# Patient Record
Sex: MALE | Race: WHITE | NOT HISPANIC OR LATINO | Employment: OTHER | URBAN - NONMETROPOLITAN AREA
[De-identification: names, ages, dates, MRNs, and addresses within clinical notes are randomized per-mention and may not be internally consistent; named-entity substitution may affect disease eponyms.]

---

## 2020-10-15 ENCOUNTER — OFFICE VISIT (OUTPATIENT)
Dept: CARDIOLOGY | Facility: CLINIC | Age: 75
End: 2020-10-15

## 2020-10-15 VITALS
WEIGHT: 205 LBS | SYSTOLIC BLOOD PRESSURE: 132 MMHG | HEIGHT: 66 IN | DIASTOLIC BLOOD PRESSURE: 60 MMHG | OXYGEN SATURATION: 99 % | HEART RATE: 73 BPM | BODY MASS INDEX: 32.95 KG/M2

## 2020-10-15 DIAGNOSIS — I25.10 CORONARY ARTERY DISEASE INVOLVING NATIVE CORONARY ARTERY OF NATIVE HEART WITHOUT ANGINA PECTORIS: Primary | ICD-10-CM

## 2020-10-15 DIAGNOSIS — R06.09 DOE (DYSPNEA ON EXERTION): ICD-10-CM

## 2020-10-15 DIAGNOSIS — I10 BENIGN ESSENTIAL HTN: ICD-10-CM

## 2020-10-15 DIAGNOSIS — Z87.891 EX-SMOKER FOR MORE THAN 1 YEAR: ICD-10-CM

## 2020-10-15 DIAGNOSIS — E78.2 MIXED HYPERLIPIDEMIA: ICD-10-CM

## 2020-10-15 DIAGNOSIS — Z95.1 S/P CABG (CORONARY ARTERY BYPASS GRAFT): ICD-10-CM

## 2020-10-15 DIAGNOSIS — I35.1 NONRHEUMATIC AORTIC VALVE INSUFFICIENCY: ICD-10-CM

## 2020-10-15 DIAGNOSIS — Z82.49 FAMILY HISTORY OF EARLY CAD: ICD-10-CM

## 2020-10-15 PROCEDURE — 93000 ELECTROCARDIOGRAM COMPLETE: CPT | Performed by: INTERNAL MEDICINE

## 2020-10-15 PROCEDURE — 99204 OFFICE O/P NEW MOD 45 MIN: CPT | Performed by: INTERNAL MEDICINE

## 2020-10-15 RX ORDER — PREDNISONE 1 MG/1
5 TABLET ORAL TAKE AS DIRECTED
COMMUNITY

## 2020-10-15 RX ORDER — NITROGLYCERIN 0.4 MG/1
0.4 TABLET SUBLINGUAL
COMMUNITY

## 2020-10-15 RX ORDER — ATORVASTATIN CALCIUM 10 MG/1
10 TABLET, FILM COATED ORAL DAILY
COMMUNITY
End: 2021-12-09 | Stop reason: SDUPTHER

## 2020-10-15 RX ORDER — TAMSULOSIN HYDROCHLORIDE 0.4 MG/1
1 CAPSULE ORAL DAILY
COMMUNITY
End: 2022-01-25 | Stop reason: SDUPTHER

## 2020-10-15 RX ORDER — TRIAMCINOLONE ACETONIDE 1 MG/G
CREAM TOPICAL DAILY PRN
COMMUNITY

## 2020-10-15 RX ORDER — CITALOPRAM 20 MG/1
20 TABLET ORAL DAILY
COMMUNITY

## 2020-10-15 RX ORDER — HYDRALAZINE HYDROCHLORIDE 50 MG/1
TABLET, FILM COATED ORAL 2 TIMES DAILY
COMMUNITY
Start: 2020-08-26 | End: 2021-04-20 | Stop reason: SDUPTHER

## 2020-10-15 RX ORDER — CLOPIDOGREL BISULFATE 75 MG/1
TABLET ORAL
COMMUNITY
End: 2021-04-20 | Stop reason: SDUPTHER

## 2020-10-15 RX ORDER — PANTOPRAZOLE SODIUM 40 MG/1
40 TABLET, DELAYED RELEASE ORAL DAILY
COMMUNITY

## 2020-10-15 RX ORDER — AMLODIPINE BESYLATE 10 MG/1
10 TABLET ORAL DAILY
COMMUNITY
Start: 2020-08-26 | End: 2022-07-12 | Stop reason: SDUPTHER

## 2020-10-15 RX ORDER — DIAZEPAM 5 MG/1
5 TABLET ORAL NIGHTLY
COMMUNITY

## 2020-10-15 RX ORDER — BUPROPION HYDROCHLORIDE 150 MG/1
150 TABLET, EXTENDED RELEASE ORAL DAILY
Status: ON HOLD | COMMUNITY
End: 2021-08-18

## 2020-10-15 RX ORDER — ISOSORBIDE MONONITRATE 60 MG/1
TABLET, EXTENDED RELEASE ORAL DAILY
COMMUNITY
Start: 2020-07-18 | End: 2021-04-20 | Stop reason: SDUPTHER

## 2020-10-15 RX ORDER — ACETIC ACID 20.65 MG/ML
SOLUTION AURICULAR (OTIC)
COMMUNITY
End: 2021-07-25

## 2020-10-15 NOTE — PROGRESS NOTES
Marika Gutierrez  [id number]  1945  74 y.o.  male    Referring Provider: Frank Martinez MD    Reason for  Visit:  Initial visit to establish care with myself for ongoing longitudinal care related to cardiovascular issues   coronary artery disease   Coronary artery bypass grafting 1996  Prior history of aortic insufficiency     Subjective    Mild chronic exertional shortness of breath on exertion relieved with rest  No significant cough or wheezing    No palpitations  No associated chest pain  No significant pedal edema    No fever or chills  No significant expectoration    No hemoptysis  No presyncope or syncope    Tolerating current medications well with no untoward side effects   Compliant with prescribed medication regimen. Tries to adhere to cardiac diet.     Joint pain in small, medium and large joints   Chronic low back pain    Easy fatiguability     History of present illness:  Marika Gutierrez is a 74 y.o. yo male with history of coronary artery disease coronary artery bypass grafting  who presents today for   Chief Complaint   Patient presents with   • Coronary Artery Disease     new pt - HX OF CABG (1992)   .    History  Past Medical History:   Diagnosis Date   • Aortic valve regurgitation    • Cancer (CMS/HCC)    • Carotid stenosis    • COPD (chronic obstructive pulmonary disease) (CMS/HCC)    • Coronary artery disease    • GERD (gastroesophageal reflux disease)    • Hyperlipidemia    • Hypertension    • Renal cell carcinoma (CMS/HCC)    ,   Past Surgical History:   Procedure Laterality Date   • BACK SURGERY     • CARDIAC CATHETERIZATION      X 4 - 2010 - 2005 - 2003 - 1996   • CORONARY ARTERY BYPASS GRAFT      X 4   • EXTERNAL EAR SURGERY     • NEPHRECTOMY      LEFT - 2017   ,   History reviewed. No pertinent family history.,   Social History     Tobacco Use   • Smoking status: Former Smoker   • Smokeless tobacco: Former User   Substance Use Topics   • Alcohol use: Not Currently   • Drug use: Not  Currently   ,     Medications  Current Outpatient Medications   Medication Sig Dispense Refill   • acetic acid (VOSOL) 2 % otic solution acetic acid 2 % ear solution     • amLODIPine (NORVASC) 10 MG tablet      • Aspirin Buf,CaCarb-MgCarb-MgO, 81 MG tablet Take 81 mg by mouth 3 (Three) Times a Week.     • atorvastatin (LIPITOR) 10 MG tablet atorvastatin 10 mg tablet     • buPROPion SR (WELLBUTRIN SR) 150 MG 12 hr tablet bupropion HCl  mg tablet,12 hr sustained-release     • citalopram (CeleXA) 20 MG tablet citalopram 20 mg tablet     • clopidogrel (PLAVIX) 75 MG tablet clopidogrel 75 mg tablet     • diazePAM (VALIUM) 5 MG tablet diazepam 5 mg tablet     • DULOXETINE HCL PO 30 mg Daily.     • hydrALAZINE (APRESOLINE) 50 MG tablet 2 (two) times a day.     • isosorbide mononitrate (IMDUR) 60 MG 24 hr tablet Daily.     • nitroglycerin (NITROSTAT) 0.4 MG SL tablet nitroglycerin 0.4 mg sublingual tablet     • pantoprazole (PROTONIX) 40 MG EC tablet Daily.     • predniSONE (DELTASONE) 5 MG tablet prednisone 5 mg tablet     • tamsulosin (FLOMAX) 0.4 MG capsule 24 hr capsule tamsulosin 0.4 mg capsule     • triamcinolone (KENALOG) 0.1 % cream triamcinolone acetonide 0.1 % topical cream       No current facility-administered medications for this visit.        Allergies:  Cyclobenzaprine, Hydrocodone-acetaminophen, and Pentazocine    Review of Systems  Review of Systems   Constitution: Negative.   HENT: Negative.    Eyes: Negative.    Cardiovascular: Positive for dyspnea on exertion and leg swelling. Negative for chest pain, claudication, cyanosis, irregular heartbeat, near-syncope, orthopnea, palpitations, paroxysmal nocturnal dyspnea and syncope.   Respiratory: Positive for shortness of breath.    Endocrine: Negative.    Hematologic/Lymphatic: Negative.    Skin: Negative.    Musculoskeletal: Positive for arthritis, back pain and joint pain.   Gastrointestinal: Negative for anorexia.   Genitourinary: Negative.   "  Neurological: Negative.    Psychiatric/Behavioral: Negative.        Objective     Physical Exam:  /60   Pulse 73   Ht 167.6 cm (66\")   Wt 93 kg (205 lb)   SpO2 99%   BMI 33.09 kg/m²     Physical Exam  Constitutional:       Appearance: He is well-developed.   HENT:      Head: Normocephalic.   Neck:      Musculoskeletal: No edema.      Vascular: Normal carotid pulses. No carotid bruit or JVD.      Trachea: No tracheal tenderness or tracheal deviation.   Cardiovascular:      Rate and Rhythm: Regular rhythm.      Pulses: Normal pulses.      Heart sounds: Murmur present. Systolic murmur present with a grade of 2/6.   Pulmonary:      Effort: Pulmonary effort is normal.      Breath sounds: No stridor.   Abdominal:      General: There is no distension.      Palpations: Abdomen is soft.      Tenderness: There is no abdominal tenderness.   Skin:     General: Skin is warm.      Findings: Bruising and ecchymosis present.   Neurological:      Mental Status: He is alert.      Cranial Nerves: No cranial nerve deficit.      Sensory: No sensory deficit.   Psychiatric:         Speech: Speech normal.         Behavior: Behavior normal.         Results Review:      ____________________________________________________________________________________________________________________________________________  Health maintenance and recommendations    Low salt/ HTN/ Heart healthy carbohydrate restricted cardiac diet   The patient is advised to reduce or avoid caffeine or other cardiac stimulants.   Minimize or avoid  NSAID-type medications      Monitor for any signs of bleeding including red or dark stools. Fall precautions.   Advised staying uptodate with immunizations per established standard guidelines.    Offered to give patient  a copy of my notes     Questions were encouraged, asked and answered to the patient's  understanding and satisfaction. Questions if any regarding current medications and side effects, need for " refills and importance of compliance to medications stressed.    Reviewed available prior notes, consults, prior visits, laboratory findings, radiology and cardiology relevant reports. Updated chart as applicable. I have reviewed the patient's medical history in detail and updated the computerized patient record as relevant.      Updated patient regarding any new or relevant abnormalities on review of records or any new findings on physical exam. Mentioned to patient about purpose of visit and desirable health short and long term goals and objectives.    Primary to monitor CBC CMP Lipid panel and TSH as applicable    ___________________________________________________________________________________________________________________________________________         ECG 12 Lead    Date/Time: 10/15/2020 11:32 AM  Performed by: Pradip Hoskins MD  Authorized by: Pradip Hoskins MD   Comparison: not compared with previous ECG   Rhythm: sinus rhythm  Rate: normal  Conduction: conduction normal  ST Segments: ST segments normal  T Waves: T waves normal  QRS axis: normal  Other: no other findings    Clinical impression: normal ECG            Assessment/Plan   Diagnoses and all orders for this visit:    1. Coronary artery disease involving native coronary artery of native heart without angina pectoris (Primary)    2. S/P CABG (coronary artery bypass graft) 1996 [place]     3. Mixed hyperlipidemia    4. Benign essential HTN    5. Family history of early CAD    6. Ex-smoker for more than 1 year 1PPD x 40 years quit 16 years ago    7. BLUE (dyspnea on exertion)  -     Adult Transthoracic Echo Complete W/ Cont if Necessary Per Protocol; Future    8. Nonrheumatic aortic valve insufficiency  -     Adult Transthoracic Echo Complete W/ Cont if Necessary Per Protocol; Future    Other orders  -     ECG 12 Lead          Plan    Orders Placed This Encounter   Procedures   • ECG 12 Lead     This order was created via procedure documentation    • Adult Transthoracic Echo Complete W/ Cont if Necessary Per Protocol     Myocardial strain to be performed during echocardiogram as long as technically feasible     Standing Status:   Future     Standing Expiration Date:   10/15/2021     Order Specific Question:   Reason for exam?     Answer:   Dyspnea      Keep LDL below 70 mg/dl. Monitor liver and renal functions.   Monitor CBC, CMP, TSH (as indicated) and Lipid Panel by primary     S/L NTG PRN for chest pain, call me or go to ER if has to use S/L nitroglycerin   OK to stop Aspirin due to severe bruising and stay on Plavix         Return in about 6 months (around 4/15/2021).

## 2020-11-16 ENCOUNTER — APPOINTMENT (OUTPATIENT)
Dept: CARDIOLOGY | Facility: HOSPITAL | Age: 75
End: 2020-11-16

## 2020-11-27 ENCOUNTER — HOSPITAL ENCOUNTER (OUTPATIENT)
Dept: CARDIOLOGY | Facility: HOSPITAL | Age: 75
Discharge: HOME | End: 2020-11-27
Admitting: INTERNAL MEDICINE

## 2020-11-27 VITALS
SYSTOLIC BLOOD PRESSURE: 160 MMHG | DIASTOLIC BLOOD PRESSURE: 71 MMHG | BODY MASS INDEX: 32.95 KG/M2 | WEIGHT: 205 LBS | HEIGHT: 66 IN

## 2020-11-27 DIAGNOSIS — R06.09 DOE (DYSPNEA ON EXERTION): ICD-10-CM

## 2020-11-27 DIAGNOSIS — I35.1 NONRHEUMATIC AORTIC VALVE INSUFFICIENCY: ICD-10-CM

## 2020-11-27 PROCEDURE — 93306 TTE W/DOPPLER COMPLETE: CPT

## 2020-11-27 PROCEDURE — 93306 TTE W/DOPPLER COMPLETE: CPT | Performed by: INTERNAL MEDICINE

## 2020-11-27 PROCEDURE — 93356 MYOCRD STRAIN IMG SPCKL TRCK: CPT

## 2020-11-27 PROCEDURE — 93356 MYOCRD STRAIN IMG SPCKL TRCK: CPT | Performed by: INTERNAL MEDICINE

## 2020-11-28 LAB
BH CV ECHO MEAS - AO MAX PG (FULL): 5.2 MMHG
BH CV ECHO MEAS - AO MAX PG: 9.4 MMHG
BH CV ECHO MEAS - AO MEAN PG (FULL): 3 MMHG
BH CV ECHO MEAS - AO MEAN PG: 5 MMHG
BH CV ECHO MEAS - AO ROOT AREA (BSA CORRECTED): 1.5
BH CV ECHO MEAS - AO ROOT AREA: 7.5 CM^2
BH CV ECHO MEAS - AO ROOT DIAM: 3.1 CM
BH CV ECHO MEAS - AO V2 MAX: 153 CM/SEC
BH CV ECHO MEAS - AO V2 MEAN: 105 CM/SEC
BH CV ECHO MEAS - AO V2 VTI: 33.5 CM
BH CV ECHO MEAS - AVA(I,A): 3 CM^2
BH CV ECHO MEAS - AVA(I,D): 3 CM^2
BH CV ECHO MEAS - AVA(V,A): 3 CM^2
BH CV ECHO MEAS - AVA(V,D): 3 CM^2
BH CV ECHO MEAS - BSA(HAYCOCK): 2.1 M^2
BH CV ECHO MEAS - BSA: 2 M^2
BH CV ECHO MEAS - BZI_BMI: 33.1 KILOGRAMS/M^2
BH CV ECHO MEAS - BZI_METRIC_HEIGHT: 167.6 CM
BH CV ECHO MEAS - BZI_METRIC_WEIGHT: 93 KG
BH CV ECHO MEAS - EDV(CUBED): 159.2 ML
BH CV ECHO MEAS - EDV(MOD-SP4): 112 ML
BH CV ECHO MEAS - EDV(TEICH): 142.5 ML
BH CV ECHO MEAS - EF(CUBED): 72.8 %
BH CV ECHO MEAS - EF(MOD-SP4): 65.3 %
BH CV ECHO MEAS - EF(TEICH): 64.1 %
BH CV ECHO MEAS - ESV(CUBED): 43.2 ML
BH CV ECHO MEAS - ESV(MOD-SP4): 38.9 ML
BH CV ECHO MEAS - ESV(TEICH): 51.2 ML
BH CV ECHO MEAS - FS: 35.2 %
BH CV ECHO MEAS - IVS/LVPW: 1.2
BH CV ECHO MEAS - IVSD: 1.1 CM
BH CV ECHO MEAS - LA DIMENSION: 4.1 CM
BH CV ECHO MEAS - LA/AO: 1.3
BH CV ECHO MEAS - LAT PEAK E' VEL: 9.6 CM/SEC
BH CV ECHO MEAS - LV DIASTOLIC VOL/BSA (35-75): 55.4 ML/M^2
BH CV ECHO MEAS - LV MASS(C)D: 206 GRAMS
BH CV ECHO MEAS - LV MASS(C)DI: 101.9 GRAMS/M^2
BH CV ECHO MEAS - LV MAX PG: 4.2 MMHG
BH CV ECHO MEAS - LV MEAN PG: 2 MMHG
BH CV ECHO MEAS - LV SYSTOLIC VOL/BSA (12-30): 19.2 ML/M^2
BH CV ECHO MEAS - LV V1 MAX: 102 CM/SEC
BH CV ECHO MEAS - LV V1 MEAN: 71.2 CM/SEC
BH CV ECHO MEAS - LV V1 VTI: 22.5 CM
BH CV ECHO MEAS - LVIDD: 5.4 CM
BH CV ECHO MEAS - LVIDS: 3.5 CM
BH CV ECHO MEAS - LVLD AP4: 8.7 CM
BH CV ECHO MEAS - LVLS AP4: 7.8 CM
BH CV ECHO MEAS - LVOT AREA (M): 4.5 CM^2
BH CV ECHO MEAS - LVOT AREA: 4.5 CM^2
BH CV ECHO MEAS - LVOT DIAM: 2.4 CM
BH CV ECHO MEAS - LVPWD: 0.92 CM
BH CV ECHO MEAS - MED PEAK E' VEL: 8.7 CM/SEC
BH CV ECHO MEAS - MV A MAX VEL: 85.9 CM/SEC
BH CV ECHO MEAS - MV DEC SLOPE: 527 CM/SEC^2
BH CV ECHO MEAS - MV DEC TIME: 0.26 SEC
BH CV ECHO MEAS - MV E MAX VEL: 78.1 CM/SEC
BH CV ECHO MEAS - MV E/A: 0.91
BH CV ECHO MEAS - MV MAX PG: 8.4 MMHG
BH CV ECHO MEAS - MV MEAN PG: 3 MMHG
BH CV ECHO MEAS - MV P1/2T MAX VEL: 142 CM/SEC
BH CV ECHO MEAS - MV P1/2T: 78.9 MSEC
BH CV ECHO MEAS - MV V2 MAX: 145 CM/SEC
BH CV ECHO MEAS - MV V2 MEAN: 78.8 CM/SEC
BH CV ECHO MEAS - MV V2 VTI: 41 CM
BH CV ECHO MEAS - MVA P1/2T LCG: 1.5 CM^2
BH CV ECHO MEAS - MVA(P1/2T): 2.8 CM^2
BH CV ECHO MEAS - MVA(VTI): 2.5 CM^2
BH CV ECHO MEAS - PA MAX PG: 3 MMHG
BH CV ECHO MEAS - PA V2 MAX: 85.9 CM/SEC
BH CV ECHO MEAS - PI END-D VEL: 97.7 CM/SEC
BH CV ECHO MEAS - RAP SYSTOLE: 5 MMHG
BH CV ECHO MEAS - RVDD: 3.5 CM
BH CV ECHO MEAS - RVSP: 33.3 MMHG
BH CV ECHO MEAS - SI(AO): 125.1 ML/M^2
BH CV ECHO MEAS - SI(CUBED): 57.4 ML/M^2
BH CV ECHO MEAS - SI(LVOT): 50.4 ML/M^2
BH CV ECHO MEAS - SI(MOD-SP4): 36.2 ML/M^2
BH CV ECHO MEAS - SI(TEICH): 45.2 ML/M^2
BH CV ECHO MEAS - SV(AO): 252.8 ML
BH CV ECHO MEAS - SV(CUBED): 116 ML
BH CV ECHO MEAS - SV(LVOT): 101.8 ML
BH CV ECHO MEAS - SV(MOD-SP4): 73.1 ML
BH CV ECHO MEAS - SV(TEICH): 91.3 ML
BH CV ECHO MEAS - TR MAX VEL: 266 CM/SEC
BH CV ECHO MEASUREMENTS AVERAGE E/E' RATIO: 8.54
LEFT ATRIUM VOLUME INDEX: 33.3 ML/M2
LEFT ATRIUM VOLUME: 67.3 CM3
MAXIMAL PREDICTED HEART RATE: 146 BPM
STRESS TARGET HR: 124 BPM

## 2021-03-15 ENCOUNTER — BULK ORDERING (OUTPATIENT)
Dept: CASE MANAGEMENT | Facility: OTHER | Age: 76
End: 2021-03-15

## 2021-03-15 DIAGNOSIS — Z23 IMMUNIZATION DUE: ICD-10-CM

## 2021-04-20 ENCOUNTER — OFFICE VISIT (OUTPATIENT)
Dept: CARDIOLOGY | Facility: CLINIC | Age: 76
End: 2021-04-20

## 2021-04-20 VITALS
HEART RATE: 63 BPM | WEIGHT: 206 LBS | HEIGHT: 64 IN | SYSTOLIC BLOOD PRESSURE: 122 MMHG | DIASTOLIC BLOOD PRESSURE: 60 MMHG | BODY MASS INDEX: 35.17 KG/M2

## 2021-04-20 DIAGNOSIS — R06.09 DOE (DYSPNEA ON EXERTION): ICD-10-CM

## 2021-04-20 DIAGNOSIS — Z87.891 EX-SMOKER FOR MORE THAN 1 YEAR: ICD-10-CM

## 2021-04-20 DIAGNOSIS — I10 BENIGN ESSENTIAL HTN: Primary | ICD-10-CM

## 2021-04-20 DIAGNOSIS — I65.22 CAROTID STENOSIS, ASYMPTOMATIC, LEFT: ICD-10-CM

## 2021-04-20 DIAGNOSIS — Z95.1 S/P CABG (CORONARY ARTERY BYPASS GRAFT): ICD-10-CM

## 2021-04-20 DIAGNOSIS — E78.2 MIXED HYPERLIPIDEMIA: ICD-10-CM

## 2021-04-20 DIAGNOSIS — I35.1 NONRHEUMATIC AORTIC VALVE INSUFFICIENCY: ICD-10-CM

## 2021-04-20 DIAGNOSIS — I25.10 CORONARY ARTERY DISEASE INVOLVING NATIVE CORONARY ARTERY OF NATIVE HEART WITHOUT ANGINA PECTORIS: ICD-10-CM

## 2021-04-20 DIAGNOSIS — Z82.49 FAMILY HISTORY OF EARLY CAD: ICD-10-CM

## 2021-04-20 PROCEDURE — 99214 OFFICE O/P EST MOD 30 MIN: CPT | Performed by: INTERNAL MEDICINE

## 2021-04-20 PROCEDURE — 93000 ELECTROCARDIOGRAM COMPLETE: CPT | Performed by: INTERNAL MEDICINE

## 2021-04-20 RX ORDER — ISOSORBIDE MONONITRATE 60 MG/1
60 TABLET, EXTENDED RELEASE ORAL DAILY
Qty: 90 TABLET | Refills: 3 | Status: SHIPPED | OUTPATIENT
Start: 2021-04-20 | End: 2021-08-25 | Stop reason: SDUPTHER

## 2021-04-20 RX ORDER — HYDRALAZINE HYDROCHLORIDE 50 MG/1
50 TABLET, FILM COATED ORAL 2 TIMES DAILY
Qty: 180 TABLET | Refills: 3 | Status: SHIPPED | OUTPATIENT
Start: 2021-04-20 | End: 2022-06-29 | Stop reason: SDUPTHER

## 2021-04-20 RX ORDER — CLOPIDOGREL BISULFATE 75 MG/1
75 TABLET ORAL EVERY OTHER DAY
Qty: 90 TABLET | Refills: 1 | Status: SHIPPED | OUTPATIENT
Start: 2021-04-20 | End: 2022-04-06

## 2021-04-20 NOTE — PROGRESS NOTES
Marika Gutierrez  [id number]  1945  75 y.o.  male    Referring Provider: Frank Martinez MD    Reason for  Visit:      Here for routine follow up   Initial visit to establish care with myself for ongoing longitudinal care related to cardiovascular issues   coronary artery disease   Coronary artery bypass grafting 1996  Prior history of aortic insufficiency   Cardiac workup test results as below: echo   Prior carotid artery stenosis per patient with no recent testing       Subjective    Overall feels the same   No new events or complaints since last visit   Overall the patient feels no major change from baseline symptoms   Similar symptoms as during last visit     Tolerating current medications well with no untoward side effects   Compliant with prescribed medication regimen. Tries to adhere to cardiac diet.      Mild chronic exertional shortness of breath on exertion relieved with rest  No significant cough or wheezing    No palpitations  No associated chest pain  No significant pedal edema    No fever or chills  No significant expectoration    No hemoptysis  No presyncope or syncope  Joint pain in small, medium and large joints   Chronic low back pain    Easy fatiguability     History of present illness:  Marika Gutierrez is a 75 y.o. yo male with history of coronary artery disease coronary artery bypass grafting  who presents today for   Chief Complaint   Patient presents with   • Coronary Artery Disease     6 month follow up    .    History  Past Medical History:   Diagnosis Date   • Aortic valve regurgitation    • Cancer (CMS/HCC)    • Carotid stenosis    • COPD (chronic obstructive pulmonary disease) (CMS/HCC)    • Coronary artery disease    • GERD (gastroesophageal reflux disease)    • Hyperlipidemia    • Hypertension    • Renal cell carcinoma (CMS/HCC)    ,   Past Surgical History:   Procedure Laterality Date   • BACK SURGERY     • CARDIAC CATHETERIZATION      X 4 - 2010 - 2005 - 2003 - 1996   •  CORONARY ARTERY BYPASS GRAFT      X 4   • EXTERNAL EAR SURGERY     • NEPHRECTOMY      LEFT - 2017   ,   History reviewed. No pertinent family history.,   Social History     Tobacco Use   • Smoking status: Former Smoker   • Smokeless tobacco: Former User   Substance Use Topics   • Alcohol use: Not Currently   • Drug use: Not Currently   ,     Medications  Current Outpatient Medications   Medication Sig Dispense Refill   • acetic acid (VOSOL) 2 % otic solution acetic acid 2 % ear solution     • amLODIPine (NORVASC) 10 MG tablet      • Aspirin Buf,CaCarb-MgCarb-MgO, 81 MG tablet Take 81 mg by mouth 3 (Three) Times a Week.     • atorvastatin (LIPITOR) 10 MG tablet atorvastatin 10 mg tablet     • buPROPion SR (WELLBUTRIN SR) 150 MG 12 hr tablet bupropion HCl  mg tablet,12 hr sustained-release     • citalopram (CeleXA) 20 MG tablet citalopram 20 mg tablet     • clopidogrel (PLAVIX) 75 MG tablet Take 1 tablet by mouth Every Other Day. 90 tablet 1   • diazePAM (VALIUM) 5 MG tablet diazepam 5 mg tablet     • DULOXETINE HCL PO 30 mg Daily.     • hydrALAZINE (APRESOLINE) 50 MG tablet Take 1 tablet by mouth 2 (two) times a day. 180 tablet 3   • isosorbide mononitrate (IMDUR) 60 MG 24 hr tablet Take 1 tablet by mouth Daily. 90 tablet 3   • nitroglycerin (NITROSTAT) 0.4 MG SL tablet nitroglycerin 0.4 mg sublingual tablet     • pantoprazole (PROTONIX) 40 MG EC tablet Daily.     • predniSONE (DELTASONE) 5 MG tablet prednisone 5 mg tablet     • tamsulosin (FLOMAX) 0.4 MG capsule 24 hr capsule tamsulosin 0.4 mg capsule     • triamcinolone (KENALOG) 0.1 % cream triamcinolone acetonide 0.1 % topical cream       No current facility-administered medications for this visit.       Allergies:  Cyclobenzaprine, Hydrocodone-acetaminophen, and Pentazocine    Review of Systems  Review of Systems   Constitutional: Negative.   HENT: Negative.    Eyes: Negative.    Cardiovascular: Positive for dyspnea on exertion and leg swelling. Negative  "for chest pain, claudication, cyanosis, irregular heartbeat, near-syncope, orthopnea, palpitations, paroxysmal nocturnal dyspnea and syncope.   Respiratory: Positive for shortness of breath.    Endocrine: Negative.    Hematologic/Lymphatic: Negative.    Skin: Negative.    Musculoskeletal: Positive for arthritis, back pain and joint pain.   Gastrointestinal: Negative for anorexia.   Genitourinary: Negative.    Neurological: Negative.    Psychiatric/Behavioral: Negative.        Objective     Physical Exam:  /60   Pulse 63   Ht 162.6 cm (64\")   Wt 93.4 kg (206 lb)   BMI 35.36 kg/m²     Physical Exam  Constitutional:       Appearance: He is well-developed.   HENT:      Head: Normocephalic.   Neck:      Vascular: Normal carotid pulses. No carotid bruit or JVD.      Trachea: No tracheal tenderness or tracheal deviation.   Cardiovascular:      Rate and Rhythm: Regular rhythm.      Pulses: Normal pulses.      Heart sounds: Murmur heard.   Systolic murmur is present with a grade of 2/6.     Pulmonary:      Effort: Pulmonary effort is normal.      Breath sounds: No stridor.   Abdominal:      General: There is no distension.      Palpations: Abdomen is soft.      Tenderness: There is no abdominal tenderness.   Musculoskeletal:      Cervical back: No edema.   Skin:     General: Skin is warm.      Findings: Bruising and ecchymosis present.   Neurological:      Mental Status: He is alert.      Cranial Nerves: No cranial nerve deficit.      Sensory: No sensory deficit.   Psychiatric:         Speech: Speech normal.         Behavior: Behavior normal.         Results Review:    Results for orders placed during the hospital encounter of 11/27/20    Adult Transthoracic Echo Complete W/ Cont if Necessary Per Protocol    Interpretation Summary  · Left ventricular ejection fraction appears to be 61 - 65%. Left ventricular systolic function is normal.  · Borderline normal global longitudinal LV strain (GLS) = -16%.  · Left " ventricular diastolic function was normal.  · Moderate aortic valve regurgitation is present.  · No evidence of pulmonary hypertension is present.         ____________________________________________________________________________________________________________________________________________  Health maintenance and recommendations    Low salt/ HTN/ Heart healthy carbohydrate restricted cardiac diet   The patient is advised to reduce or avoid caffeine or other cardiac stimulants.   Minimize or avoid  NSAID-type medications      Monitor for any signs of bleeding including red or dark stools. Fall precautions.   Advised staying uptodate with immunizations per established standard guidelines.    Offered to give patient  a copy of my notes     Questions were encouraged, asked and answered to the patient's  understanding and satisfaction. Questions if any regarding current medications and side effects, need for refills and importance of compliance to medications stressed.    Reviewed available prior notes, consults, prior visits, laboratory findings, radiology and cardiology relevant reports. Updated chart as applicable. I have reviewed the patient's medical history in detail and updated the computerized patient record as relevant.      Updated patient regarding any new or relevant abnormalities on review of records or any new findings on physical exam. Mentioned to patient about purpose of visit and desirable health short and long term goals and objectives.    Primary to monitor CBC CMP Lipid panel and TSH as applicable    ___________________________________________________________________________________________________________________________________________         ECG 12 Lead    Date/Time: 4/20/2021 10:50 AM  Performed by: Pradip Hoskins MD  Authorized by: Pradip Hoskins MD   Comparison: compared with previous ECG from 10/15/2020  Comparison to previous ECG: Nonspecific STT changes   Rhythm: sinus rhythm  Ectopy:  atrial premature contractions  Rate: normal  QRS axis: normal  Other findings: non-specific ST-T wave changes    Clinical impression: abnormal EKG            Assessment/Plan   Diagnoses and all orders for this visit:    1. Benign essential HTN (Primary)    2. Mixed hyperlipidemia    3. S/P CABG (coronary artery bypass graft) 1996 [place]     4. Family history of early CAD    5. Ex-smoker for more than 1 year 1PPD x 40 years quit 16 years ago    6. Nonrheumatic aortic valve insufficiency    7. Coronary artery disease involving native coronary artery of native heart without angina pectoris    8. BLUE (dyspnea on exertion)  -     Stress Test With Myocardial Perfusion (1 Day); Future    9. Carotid stenosis, asymptomatic, left  -     US Carotid Bilateral; Future    Other orders  -     hydrALAZINE (APRESOLINE) 50 MG tablet; Take 1 tablet by mouth 2 (two) times a day.  Dispense: 180 tablet; Refill: 3  -     clopidogrel (PLAVIX) 75 MG tablet; Take 1 tablet by mouth Every Other Day.  Dispense: 90 tablet; Refill: 1  -     isosorbide mononitrate (IMDUR) 60 MG 24 hr tablet; Take 1 tablet by mouth Daily.  Dispense: 90 tablet; Refill: 3  -     ECG 12 Lead          Plan      Orders Placed This Encounter   Procedures   • US Carotid Bilateral     Standing Status:   Future     Standing Expiration Date:   4/20/2022     Order Specific Question:   Reason for Exam:     Answer:   carotisd stenosis   • Stress Test With Myocardial Perfusion (1 Day)     Standing Status:   Future     Standing Expiration Date:   4/20/2022     Order Specific Question:   What stress agent will be used?     Answer:   Regadenoson (Lexiscan)     Order Specific Question:   Difficulty walking criteria?     Answer:   Musculoskeletal (hips, knees, feet, back, amputee)     Order Specific Question:   Reason for exam?     Answer:   Known Coronary Artery Disease     Order Specific Question:   Release to patient     Answer:   Immediate   • ECG 12 Lead     This order was  created via procedure documentation     Order Specific Question:   Release to patient     Answer:   Immediate        Requested Prescriptions     Signed Prescriptions Disp Refills   • hydrALAZINE (APRESOLINE) 50 MG tablet 180 tablet 3     Sig: Take 1 tablet by mouth 2 (two) times a day.   • clopidogrel (PLAVIX) 75 MG tablet 90 tablet 1     Sig: Take 1 tablet by mouth Every Other Day.   • isosorbide mononitrate (IMDUR) 60 MG 24 hr tablet 90 tablet 3     Sig: Take 1 tablet by mouth Daily.      Patient expressed understanding  Encouraged and answered all questions   Discussed with the patient and all questioned fully answered. He will call me if any problems arise.   Discussed results of prior testing with patient : echo   Monitor aortic insufficiency by echo  as well electrocardiogram from today       S/L NTG PRN for chest pain, call me or go to ER if has to use S/L nitroglycerin     Keep LDL below 70 mg/dl. Monitor liver and renal functions.   Monitor CBC, CMP, TSH (as indicated) and Lipid Panel by primary     Monitor for any signs of bleeding including red or dark stools as well as easy bruisabilty. Fall precautions.       I support the patient's decision to take the Covid -19 vaccine   Had both dose   No major issues   Now fully immunized        Follow up with DONA Reese or any mid level provider working with me to address interim issues             Return in about 6 weeks (around 6/1/2021).

## 2021-06-11 NOTE — PROGRESS NOTES
Chief Complaint  Left Renal Mass    Subjective          Marika Gutierrez presents to [facility] UROLOGY for   History of Present Illness  Pt is four years post op from a left hand-assisted laparoscopic radical nephrectomy.  This was done in [facility] for an incidentally identified renal mass during evaluation for acute diverticulitis.  Pathology revealed organ confined disease of a 6 cm in greatest dimension.  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  Patient has not had any imaging he says in the last 2 to 2-1/2 years.  No significant neurologic symptoms.  Denies flank pain, weight loss, night sweats or hematuria.    Current Outpatient Medications:   •  amLODIPine (NORVASC) 10 MG tablet, , Disp: , Rfl:   •  Aspirin Buf,CaCarb-MgCarb-MgO, 81 MG tablet, Take 81 mg by mouth 3 (Three) Times a Week., Disp: , Rfl:   •  atorvastatin (LIPITOR) 10 MG tablet, atorvastatin 10 mg tablet, Disp: , Rfl:   •  buPROPion SR (WELLBUTRIN SR) 150 MG 12 hr tablet, bupropion HCl  mg tablet,12 hr sustained-release, Disp: , Rfl:   •  citalopram (CeleXA) 20 MG tablet, citalopram 20 mg tablet, Disp: , Rfl:   •  clopidogrel (PLAVIX) 75 MG tablet, Take 1 tablet by mouth Every Other Day., Disp: 90 tablet, Rfl: 1  •  diazePAM (VALIUM) 5 MG tablet, diazepam 5 mg tablet, Disp: , Rfl:   •  DULOXETINE HCL PO, 30 mg Daily., Disp: , Rfl:   •  hydrALAZINE (APRESOLINE) 50 MG tablet, Take 1 tablet by mouth 2 (two) times a day., Disp: 180 tablet, Rfl: 3  •  isosorbide mononitrate (IMDUR) 60 MG 24 hr tablet, Take 1 tablet by mouth Daily., Disp: 90 tablet, Rfl: 3  •  nitroglycerin (NITROSTAT) 0.4 MG SL tablet, nitroglycerin 0.4 mg sublingual tablet, Disp: , Rfl:   •  pantoprazole (PROTONIX) 40 MG EC tablet, Daily., Disp: , Rfl:   •  predniSONE (DELTASONE) 5 MG tablet, prednisone 5 mg tablet, Disp: , Rfl:   •  tamsulosin (FLOMAX) 0.4 MG capsule 24 hr capsule, tamsulosin 0.4 mg  "capsule, Disp: , Rfl:   •  triamcinolone (KENALOG) 0.1 % cream, triamcinolone acetonide 0.1 % topical cream, Disp: , Rfl:   •  acetic acid (VOSOL) 2 % otic solution, acetic acid 2 % ear solution, Disp: , Rfl:   Past Medical History:   Diagnosis Date   • Aortic valve regurgitation    • Cancer (CMS/HCC)    • Carotid stenosis    • COPD (chronic obstructive pulmonary disease) (CMS/HCC)    • Coronary artery disease    • GERD (gastroesophageal reflux disease)    • Hyperlipidemia    • Hypertension    • Renal cell carcinoma (CMS/HCC)      Past Surgical History:   Procedure Laterality Date   • BACK SURGERY     • CARDIAC CATHETERIZATION      X 4 - 2010 - 2005 - 2003 - 1996   • CORONARY ARTERY BYPASS GRAFT      X 4   • EXTERNAL EAR SURGERY     • NEPHRECTOMY      LEFT - 2017         Review  of systems  Neuro: No headaches, visual, motor or sensory symptoms of concern  Constitutional: Negative for chills and fever.   Gastrointestinal: Negative for abdominal pain, anal bleeding and blood in stool.   Genitourinary: Negative for flank pain and hematuria.         Objective   PHYSICAL EXAM  Vital Signs:   Temp 97.1 °F (36.2 °C)   Ht 175.3 cm (69\")   Wt 92.4 kg (203 lb 12.8 oz)   BMI 30.10 kg/m²     Constitutional: Patient is without distress or deformity.  Vital signs are reviewed as above.    Neuro: No confusion; No disorientation; Alert and oriented  Pulmonary: No respiratory distress.   Skin: No pallor or diaphoresis  GI: Soft. The patient exhibits no distension and no mass. There is no tenderness. There is no rebound and no guarding. No hernia.   : No CVA tenderness over kidneys.  Bladder not palpably distended.  Psychiatric:  normal mood and affect. Not agitated.           DATA  Result Review :     EXTERNAL MEDICAL RECORDS - SCAN - NEW PATIENT RECORDS UROLOGY PATH/LABS/RADIOLOGY (06/03/2021)      International Prostate Symptom Score  The following is posted based on patient questionnaire answers:  0 - not at all    1-7 mild " symptoms  1- Less than one time in five  8-19 moderate symptoms  2 -Less than half the time  20-35 severe symptoms  3 - About half the time  4 - More than half the time  5 - Almost always     For following sections:  Incomplete Emptying: - How often have you had the sensation  of not emptying your bladder completely after you finished urinating?  0  Frequency: -How often have you had to urinate again less than   two hours after you finished urinating?      2  Intermittency: -How often have you found you stopped and started again  Several times when you urinate?       1  Urgency: -How often do you find it difficult to postpone urination?             1  Weak stream: - How often have you had a weak urinary stream?             0  Straining: - How often have you had to push or strain to begin  Urination?          0  Sleeping: -How many times did you most typically get up to urinate   From the time you went to bed at night until the time you got up in the   3  Morning          Total `  7    Quality of Life  How would you feel if you had to live with your urinary condition the way   1  It is now, no better, no worse for the rest of your life?    Where: 0=delighted; 1= pleased, 2= mostly satisfied, 3= mixed, 4 = mostly  Dissatisfied, 5= Unhappy, 6 = terrible      Medical Records Summary:  Available to me today [facility]  [facility]  that can be summarized as follows:   Incidental left renal mass found when pt had acute diverticulitis on CT  Underwent left hand assisted lap radical nephrectomy (10/2017)- 6x6x5.7 cm clear cell renal cell carcinoma - t1b,n0,m0            Brief Urine Lab Results  (Last result in the past 365 days)      Color   Clarity   Blood   Leuk Est   Nitrite   Protein   CREAT   Urine HCG        06/22/21 1057 Yellow Clear Negative Negative Negative Negative                    ASSESSMENT AND PLAN      Problem List Items Addressed This Visit     None      Visit  Diagnoses     Renal cell carcinoma of left kidney (CMS/HCC)    -  Primary    Relevant Orders    POC Urinalysis Dipstick, Multipro (Completed)    CBC Auto Differential    Comprehensive metabolic panel (Completed)    eGFR-Glomerular Filtration    Comprehensive metabolic panel    eGFR-Glomerular Filtration    CBC Auto Differential    XR Chest 2 View    US Renal Bilateral      Pt has <15% risk of recurrence. Now 3&1/2 years post op. No imaging last two years per pt.     F/u 6 months with renal US and CXR, CBC and CMP      AUA guidelines for follow-up  For patients with a history of low-risk (pT1, N0, Mx) renal cell carcinoma that was managed surgically, chest x-rays should be performed annually for 3 years and then only as clinically indicated to assess for pulmonary metastases. Abdominal imaging should be considered annually for three years.       NCCN guidelines for follow-up nephrectomy  In patients who have undergone partial or radical nephrectomy for stage pT1a or pT1b RCC, the NCCN recommends the following:  Complete history and physical examination annually  Laboratory tests annually, as clinically indicated  Abdominal imaging: Baseline abdominal CT or MRI (preferred), or US within 3-12 mo of surgery, then annually for 3 y or longer as clinically indicated; a more rigorous imaging schedule or technique modality can be considered in cases with positive margins or adverse pathologic features (eg, sarcomatoid, high-grade [grade 3/4], positive margins)  Chest imaging: Chest x-ray or CT annually for at least 5 y, then as clinically indicated. A more rigorous imaging schedule or technique modality can be considered in cases with positive margins or adverse pathologic features [19]        FOLLOW UP     No follow-ups on file.      (Please note that portions of this note were completed with a voice recognition program.)  Kodi Burrell MD  06/23/21  07:50 CDT

## 2021-06-15 ENCOUNTER — APPOINTMENT (OUTPATIENT)
Dept: CARDIOLOGY | Facility: HOSPITAL | Age: 76
End: 2021-06-15

## 2021-06-15 ENCOUNTER — HOSPITAL ENCOUNTER (OUTPATIENT)
Dept: CARDIOLOGY | Facility: HOSPITAL | Age: 76
End: 2021-06-15

## 2021-06-15 ENCOUNTER — HOSPITAL ENCOUNTER (OUTPATIENT)
Dept: ULTRASOUND IMAGING | Facility: HOSPITAL | Age: 76
Discharge: HOME | End: 2021-06-15
Admitting: INTERNAL MEDICINE

## 2021-06-15 DIAGNOSIS — I65.22 CAROTID STENOSIS, ASYMPTOMATIC, LEFT: ICD-10-CM

## 2021-06-15 PROCEDURE — 93880 EXTRACRANIAL BILAT STUDY: CPT

## 2021-06-15 PROCEDURE — 93880 EXTRACRANIAL BILAT STUDY: CPT | Performed by: SURGERY

## 2021-06-16 DIAGNOSIS — I65.23 CAROTID STENOSIS, ASYMPTOMATIC, BILATERAL: Primary | ICD-10-CM

## 2021-06-22 ENCOUNTER — OFFICE VISIT (OUTPATIENT)
Dept: UROLOGY | Facility: CLINIC | Age: 76
End: 2021-06-22

## 2021-06-22 VITALS — BODY MASS INDEX: 30.18 KG/M2 | TEMPERATURE: 97.1 F | HEIGHT: 69 IN | WEIGHT: 203.8 LBS

## 2021-06-22 DIAGNOSIS — C64.2 RENAL CELL CARCINOMA OF LEFT KIDNEY (HCC): Primary | ICD-10-CM

## 2021-06-22 LAB
BILIRUB BLD-MCNC: NEGATIVE MG/DL
CLARITY, POC: CLEAR
COLOR UR: YELLOW
GLUCOSE UR STRIP-MCNC: NEGATIVE MG/DL
KETONES UR QL: ABNORMAL
LEUKOCYTE EST, POC: NEGATIVE
NITRITE UR-MCNC: NEGATIVE MG/ML
PH UR: 5.5 [PH] (ref 5–8)
PROT UR STRIP-MCNC: NEGATIVE MG/DL
RBC # UR STRIP: NEGATIVE /UL
SP GR UR: 1.02 (ref 1–1.03)
UROBILINOGEN UR QL: NORMAL

## 2021-06-22 PROCEDURE — 99203 OFFICE O/P NEW LOW 30 MIN: CPT | Performed by: UROLOGY

## 2021-06-22 PROCEDURE — 81003 URINALYSIS AUTO W/O SCOPE: CPT | Performed by: UROLOGY

## 2021-06-23 LAB
ALBUMIN SERPL-MCNC: 4.5 G/DL (ref 3.5–5.2)
ALBUMIN/GLOB SERPL: 1.7 G/DL
ALP SERPL-CCNC: 139 U/L (ref 39–117)
ALT SERPL-CCNC: 18 U/L (ref 1–41)
AST SERPL-CCNC: 16 U/L (ref 1–40)
BASOPHILS # BLD AUTO: 0.05 10*3/MM3 (ref 0–0.2)
BASOPHILS NFR BLD AUTO: 0.6 % (ref 0–1.5)
BILIRUB SERPL-MCNC: 0.5 MG/DL (ref 0–1.2)
BUN SERPL-MCNC: 19 MG/DL (ref 8–23)
BUN/CREAT SERPL: 13 (ref 7–25)
CALCIUM SERPL-MCNC: 9.3 MG/DL (ref 8.6–10.5)
CHLORIDE SERPL-SCNC: 102 MMOL/L (ref 98–107)
CO2 SERPL-SCNC: 25 MMOL/L (ref 22–29)
CREAT SERPL-MCNC: 1.46 MG/DL (ref 0.76–1.27)
EOSINOPHIL # BLD AUTO: 0.37 10*3/MM3 (ref 0–0.4)
EOSINOPHIL NFR BLD AUTO: 4.1 % (ref 0.3–6.2)
ERYTHROCYTE [DISTWIDTH] IN BLOOD BY AUTOMATED COUNT: 12.9 % (ref 12.3–15.4)
GLOBULIN SER CALC-MCNC: 2.6 GM/DL
GLUCOSE SERPL-MCNC: 117 MG/DL (ref 65–99)
HCT VFR BLD AUTO: 38.5 % (ref 37.5–51)
HGB BLD-MCNC: 13.2 G/DL (ref 13–17.7)
IMM GRANULOCYTES # BLD AUTO: 0.05 10*3/MM3 (ref 0–0.05)
IMM GRANULOCYTES NFR BLD AUTO: 0.6 % (ref 0–0.5)
LYMPHOCYTES # BLD AUTO: 2.18 10*3/MM3 (ref 0.7–3.1)
LYMPHOCYTES NFR BLD AUTO: 24.3 % (ref 19.6–45.3)
MCH RBC QN AUTO: 30.2 PG (ref 26.6–33)
MCHC RBC AUTO-ENTMCNC: 34.3 G/DL (ref 31.5–35.7)
MCV RBC AUTO: 88.1 FL (ref 79–97)
MONOCYTES # BLD AUTO: 0.9 10*3/MM3 (ref 0.1–0.9)
MONOCYTES NFR BLD AUTO: 10 % (ref 5–12)
NEUTROPHILS # BLD AUTO: 5.43 10*3/MM3 (ref 1.7–7)
NEUTROPHILS NFR BLD AUTO: 60.4 % (ref 42.7–76)
NRBC BLD AUTO-RTO: 0 /100 WBC (ref 0–0.2)
PLATELET # BLD AUTO: 194 10*3/MM3 (ref 140–450)
POTASSIUM SERPL-SCNC: 4.7 MMOL/L (ref 3.5–5.2)
PROT SERPL-MCNC: 7.1 G/DL (ref 6–8.5)
RBC # BLD AUTO: 4.37 10*6/MM3 (ref 4.14–5.8)
SODIUM SERPL-SCNC: 137 MMOL/L (ref 136–145)
WBC # BLD AUTO: 8.98 10*3/MM3 (ref 3.4–10.8)

## 2021-06-24 ENCOUNTER — HOSPITAL ENCOUNTER (OUTPATIENT)
Dept: CT IMAGING | Facility: HOSPITAL | Age: 76
Discharge: HOME | End: 2021-06-24
Admitting: INTERNAL MEDICINE

## 2021-06-24 DIAGNOSIS — I65.23 CAROTID STENOSIS, ASYMPTOMATIC, BILATERAL: ICD-10-CM

## 2021-06-24 PROCEDURE — 70498 CT ANGIOGRAPHY NECK: CPT

## 2021-06-24 PROCEDURE — 25010000002 IOPAMIDOL 61 % SOLUTION: Performed by: INTERNAL MEDICINE

## 2021-06-24 RX ADMIN — IOPAMIDOL 100 ML: 612 INJECTION, SOLUTION INTRAVENOUS at 09:20

## 2021-07-22 ENCOUNTER — HOSPITAL ENCOUNTER (OUTPATIENT)
Dept: CARDIOLOGY | Facility: HOSPITAL | Age: 76
Discharge: HOME | End: 2021-07-22

## 2021-07-22 VITALS
HEART RATE: 58 BPM | HEIGHT: 69 IN | DIASTOLIC BLOOD PRESSURE: 58 MMHG | SYSTOLIC BLOOD PRESSURE: 116 MMHG | BODY MASS INDEX: 30.17 KG/M2 | WEIGHT: 203.71 LBS

## 2021-07-22 DIAGNOSIS — R06.09 DOE (DYSPNEA ON EXERTION): ICD-10-CM

## 2021-07-22 PROCEDURE — 78452 HT MUSCLE IMAGE SPECT MULT: CPT | Performed by: INTERNAL MEDICINE

## 2021-07-22 PROCEDURE — 25010000002 REGADENOSON 0.4 MG/5ML SOLUTION: Performed by: INTERNAL MEDICINE

## 2021-07-22 PROCEDURE — 93017 CV STRESS TEST TRACING ONLY: CPT

## 2021-07-22 PROCEDURE — 0 TECHNETIUM SESTAMIBI: Performed by: INTERNAL MEDICINE

## 2021-07-22 PROCEDURE — 93018 CV STRESS TEST I&R ONLY: CPT | Performed by: INTERNAL MEDICINE

## 2021-07-22 PROCEDURE — A9500 TC99M SESTAMIBI: HCPCS | Performed by: INTERNAL MEDICINE

## 2021-07-22 PROCEDURE — 78452 HT MUSCLE IMAGE SPECT MULT: CPT

## 2021-07-22 RX ADMIN — REGADENOSON 0.4 MG: 0.08 INJECTION, SOLUTION INTRAVENOUS at 10:02

## 2021-07-22 RX ADMIN — TECHNETIUM TC 99M SESTAMIBI 1 DOSE: 1 INJECTION INTRAVENOUS at 10:33

## 2021-07-22 RX ADMIN — TECHNETIUM TC 99M SESTAMIBI 1 DOSE: 1 INJECTION INTRAVENOUS at 08:45

## 2021-07-23 LAB
BH CV NUCLEAR PRIOR STUDY: 3
BH CV REST NUCLEAR ISOTOPE DOSE: 10.7 MCI
BH CV STRESS BP STAGE 1: NORMAL
BH CV STRESS COMMENTS STAGE 1: NORMAL
BH CV STRESS DOSE REGADENOSON STAGE 1: 0.4
BH CV STRESS DURATION MIN STAGE 1: 0
BH CV STRESS DURATION SEC STAGE 1: 10
BH CV STRESS HR STAGE 1: 86
BH CV STRESS NUCLEAR ISOTOPE DOSE: 32.8 MCI
BH CV STRESS PROTOCOL 1: NORMAL
BH CV STRESS RECOVERY BP: NORMAL MMHG
BH CV STRESS RECOVERY HR: 64 BPM
BH CV STRESS STAGE 1: 1
LV EF NUC BP: 63 %
MAXIMAL PREDICTED HEART RATE: 146 BPM
PERCENT MAX PREDICTED HR: 58.9 %
STRESS BASELINE BP: NORMAL MMHG
STRESS BASELINE HR: 58 BPM
STRESS PERCENT HR: 69 %
STRESS POST EXERCISE DUR MIN: 0 MIN
STRESS POST EXERCISE DUR SEC: 10 SEC
STRESS POST PEAK BP: NORMAL MMHG
STRESS POST PEAK HR: 86 BPM
STRESS TARGET HR: 124 BPM

## 2021-07-25 PROBLEM — E66.09 CLASS 1 OBESITY DUE TO EXCESS CALORIES WITH SERIOUS COMORBIDITY AND BODY MASS INDEX (BMI) OF 30.0 TO 30.9 IN ADULT: Status: ACTIVE | Noted: 2021-07-25

## 2021-07-25 PROBLEM — E66.811 CLASS 1 OBESITY DUE TO EXCESS CALORIES WITH SERIOUS COMORBIDITY AND BODY MASS INDEX (BMI) OF 30.0 TO 30.9 IN ADULT: Status: ACTIVE | Noted: 2021-07-25

## 2021-07-26 ENCOUNTER — OFFICE VISIT (OUTPATIENT)
Dept: CARDIOLOGY | Facility: CLINIC | Age: 76
End: 2021-07-26

## 2021-07-26 ENCOUNTER — TELEPHONE (OUTPATIENT)
Dept: VASCULAR SURGERY | Facility: CLINIC | Age: 76
End: 2021-07-26

## 2021-07-26 VITALS
DIASTOLIC BLOOD PRESSURE: 60 MMHG | OXYGEN SATURATION: 98 % | WEIGHT: 203.2 LBS | SYSTOLIC BLOOD PRESSURE: 162 MMHG | BODY MASS INDEX: 32.66 KG/M2 | HEART RATE: 67 BPM | HEIGHT: 66 IN

## 2021-07-26 DIAGNOSIS — I25.10 CORONARY ARTERY DISEASE INVOLVING NATIVE CORONARY ARTERY OF NATIVE HEART WITHOUT ANGINA PECTORIS: Primary | ICD-10-CM

## 2021-07-26 DIAGNOSIS — E78.2 MIXED HYPERLIPIDEMIA: ICD-10-CM

## 2021-07-26 DIAGNOSIS — Z95.1 S/P CABG (CORONARY ARTERY BYPASS GRAFT): ICD-10-CM

## 2021-07-26 DIAGNOSIS — I35.1 NONRHEUMATIC AORTIC VALVE INSUFFICIENCY: ICD-10-CM

## 2021-07-26 DIAGNOSIS — I10 BENIGN ESSENTIAL HTN: ICD-10-CM

## 2021-07-26 DIAGNOSIS — E66.09 CLASS 1 OBESITY DUE TO EXCESS CALORIES WITH SERIOUS COMORBIDITY AND BODY MASS INDEX (BMI) OF 32.0 TO 32.9 IN ADULT: ICD-10-CM

## 2021-07-26 PROCEDURE — 99214 OFFICE O/P EST MOD 30 MIN: CPT | Performed by: NURSE PRACTITIONER

## 2021-07-26 NOTE — PROGRESS NOTES
Chief Complaint  Shortness of Breath    Subjective          Marika Gutierrez presents to [facility] CARDIOLOGY for routine follow up of outpatient testing. Lexiscan on 7/22/21 revealed a medium sized infarct in the lateral wall with no ongoing ischemia. Bilateral carotid artery ultrasound on 6/15/21 revealed 50-69% stenosis of the right and left carotid artery ultrasounds. He has a history of coronary artery disease s/p CABG in 1996, aortic valve regurgitation, COPD, renal cell carcinoma, bilateral carotid artery stenosis, hypertension, hyperlipidemia and obesity. He continues to complain of shortness of breath. The patient denies chest pain, palpitations, dizziness, syncope, orthopnea, PND, swelling or decreased stamina. He denies any signs of bleeding.     Coronary Artery Disease  Presents for follow-up visit. Symptoms include shortness of breath. Pertinent negatives include no chest pain, chest pressure, chest tightness, dizziness, leg swelling, muscle weakness, palpitations or weight gain. Risk factors include hyperlipidemia. The symptoms have been stable. Compliance with diet is variable. Compliance with exercise is variable. Compliance with medications is good.   Hypertension  This is a chronic problem. The current episode started more than 1 year ago. The problem is uncontrolled. Associated symptoms include shortness of breath. Pertinent negatives include no anxiety, blurred vision, chest pain, headaches, malaise/fatigue, neck pain, orthopnea, palpitations, peripheral edema, PND or sweats. Risk factors for coronary artery disease include male gender and dyslipidemia. Current antihypertension treatment includes calcium channel blockers and direct vasodilators. Hypertensive end-organ damage includes CAD/MI.   Hyperlipidemia  This is a chronic problem. The current episode started more than 1 year ago. Associated symptoms include shortness of breath. Pertinent negatives include no chest pain.  "Current antihyperlipidemic treatment includes statins. Risk factors for coronary artery disease include hypertension, dyslipidemia and male sex.       Objective   Vital Signs:   /60 (BP Location: Left arm, Patient Position: Sitting, Cuff Size: Adult)   Pulse 67   Ht 167.6 cm (66\")   Wt 92.2 kg (203 lb 3.2 oz)   SpO2 98%   BMI 32.80 kg/m²     Vitals and nursing note reviewed.   Constitutional:       General: Awake.      Appearance: Normal and healthy appearance. Well-developed, normal weight and not in distress.   Eyes:      General: Lids are normal.      Conjunctiva/sclera: Conjunctivae normal.      Pupils: Pupils are equal, round, and reactive to light.   HENT:      Head: Normocephalic and atraumatic.      Nose: Nose normal.   Neck:      Vascular: No JVR. JVD normal.   Pulmonary:      Effort: Pulmonary effort is normal.      Breath sounds: Normal breath sounds. No wheezing. No rhonchi. No rales.   Chest:      Chest wall: Not tender to palpatation.   Cardiovascular:      PMI at left midclavicular line. Normal rate. Regular rhythm. Normal S1. Normal S2.      Murmurs: There is a grade 2/6 high frequency blowing holosystolic murmur at the apex. There is a grade 2/4 high frequency blowing decrescendo, early diastolic murmur at the URSB, radiating to the apex.      No gallop. No click. No rub.   Pulses:     Intact distal pulses.   Edema:     Peripheral edema absent.   Abdominal:      General: Bowel sounds are normal.      Palpations: Abdomen is soft.      Tenderness: There is no abdominal tenderness.   Musculoskeletal: Normal range of motion.         General: No tenderness.      Cervical back: Normal range of motion. Skin:     General: Skin is warm and dry.   Neurological:      General: No focal deficit present.      Mental Status: Alert, oriented to person, place, and time and oriented to person, place and time.   Psychiatric:         Attention and Perception: Attention and perception normal.         Mood and " Affect: Mood and affect normal.         Speech: Speech normal.         Behavior: Behavior normal. Behavior is cooperative.         Thought Content: Thought content normal.         Cognition and Memory: Cognition and memory normal.         Judgment: Judgment normal.        Result Review :   The following data was reviewed by: DONA Johnson on 07/26/2021:  Common labs    Common Labsle 6/22/21 6/22/21    1042 1042   Glucose 117 (A)    BUN 19    Creatinine 1.46 (A)    eGFR Non  Am 47 (A)    eGFR African Am 57 (A)    Sodium 137    Potassium 4.7    Chloride 102    Calcium 9.3    Total Protein 7.1    Albumin 4.50    Total Bilirubin 0.5    Alkaline Phosphatase 139 (A)    AST (SGOT) 16    ALT (SGPT) 18    WBC  8.98   Hemoglobin  13.2   Hematocrit  38.5   Platelets  194   (A) Abnormal value       Comments are available for some flowsheets but are not being displayed.           Data reviewed: Cardiology studies Lexiscan 7/22/21 and carotid ultrasound 6/15/21          Assessment and Plan    Diagnoses and all orders for this visit:    1. Coronary artery disease involving native coronary artery of native heart without angina pectoris (Primary)- No clinical signs of ischemia. Lexiscan 7/22/21 showed no ischemia.     2. S/P CABG (coronary artery bypass graft)- 1996 in [place]. Pt continues on aspirin and plavix. Denies bleeding.      3. Nonrheumatic aortic valve insufficiency- moderate on echo 11/27/20. Monitor with routine echo 11/21.     4. Benign essential HTN- blood pressure elevated in office today. Pt reports good control at home with readings in the 120s/80s. Continue amlodipine and hydralazine.  Monitor and record daily blood pressure. Report readings consistently higher than 140/90 or consistently lower than 100/60.     5. Mixed hyperlipidemia- management per PCP. Continue Lipitor.     6. Class 1 obesity due to excess calories with serious comorbidity and body mass index (BMI) of 32.0 to 32.9 in -  Patient's Body mass index is 32.8 kg/m². indicating that he is obese (BMI >30). Obesity-related health conditions include the following: hypertension and dyslipidemias. Obesity is unchanged. BMI is is above average; no BMI management plan is appropriate. We discussed low calorie, low carb based diet program, portion control and increasing exercise.    Advance Care Planning   ACP discussion was held with the patient during this visit. Patient does not have an advance directive, information provided.      Follow Up   Return in about 6 months (around 1/26/2022) for Next scheduled follow up with Dr. Hoskins.  Patient was given instructions and counseling regarding his condition or for health maintenance advice. Please see specific information pulled into the AVS if appropriate.

## 2021-07-26 NOTE — PATIENT INSTRUCTIONS
Obesity, Adult  Obesity is having too much body fat. Being obese means that your weight is more than what is healthy for you.  BMI is a number that explains how much body fat you have. If you have a BMI of 30 or more, you are obese. Obesity is often caused by eating or drinking more calories than your body uses. Changing your lifestyle can help you lose weight.  Obesity can cause serious health problems, such as:  · Stroke.  · Coronary artery disease (CAD).  · Type 2 diabetes.  · Some types of cancer, including cancers of the colon, breast, uterus, and gallbladder.  · Osteoarthritis.  · High blood pressure (hypertension).  · High cholesterol.  · Sleep apnea.  · Gallbladder stones.  · Infertility problems.  What are the causes?  · Eating meals each day that are high in calories, sugar, and fat.  · Being born with genes that may make you more likely to become obese.  · Having a medical condition that causes obesity.  · Taking certain medicines.  · Sitting a lot (having a sedentary lifestyle).  · Not getting enough sleep.  · Drinking a lot of drinks that have sugar in them.  What increases the risk?  · Having a family history of obesity.  · Being an  woman.  · Being a  man.  · Living in an area with limited access to:  ? [place], recreation centers, or sidewalks.  ? Healthy food choices, such as grocery stores and [employer] markets.  What are the signs or symptoms?  The main sign is having too much body fat.  How is this treated?  · Treatment for this condition often includes changing your lifestyle. Treatment may include:  ? Changing your diet. This may include making a healthy meal plan.  ? Exercise. This may include activity that causes your heart to beat faster (aerobic exercise) and strength training. Work with your doctor to design a program that works for you.  ? Medicine to help you lose weight. This may be used if you are not able to lose 1 pound a week after 6 weeks of healthy eating and  more exercise.  ? Treating conditions that cause the obesity.  ? Surgery. Options may include gastric banding and gastric bypass. This may be done if:  § Other treatments have not helped to improve your condition.  § You have a BMI of 40 or higher.  § You have life-threatening health problems related to obesity.  Follow these instructions at home:  Eating and drinking    · Follow advice from your doctor about what to eat and drink. Your doctor may tell you to:  ? Limit fast food, sweets, and processed snack foods.  ? Choose low-fat options. For example, choose low-fat milk instead of whole milk.  ? Eat 5 or more servings of fruits or vegetables each day.  ? Eat at home more often. This gives you more control over what you eat.  ? Choose healthy foods when you eat out.  ? Learn to read food labels. This will help you learn how much food is in 1 serving.  ? Keep low-fat snacks available.  ? Avoid drinks that have a lot of sugar in them. These include soda, fruit juice, iced tea with sugar, and flavored milk.  · Drink enough water to keep your pee (urine) pale yellow.  · Do not go on fad diets.  Physical activity  · Exercise often, as told by your doctor. Most adults should get up to 150 minutes of moderate-intensity exercise every week.Ask your doctor:  ? What types of exercise are safe for you.  ? How often you should exercise.  · Warm up and stretch before being active.  · Do slow stretching after being active (cool down).  · Rest between times of being active.  Lifestyle  · Work with your doctor and a food expert (dietitian) to set a weight-loss goal that is best for you.  · Limit your screen time.  · Find ways to reward yourself that do not involve food.  · Do not drink alcohol if:  ? Your doctor tells you not to drink.  ? You are pregnant, may be pregnant, or are planning to become pregnant.  · If you drink alcohol:  ? Limit how much you use to:  § 0-1 drink a day for women.  § 0-2 drinks a day for men.  ? Be  aware of how much alcohol is in your drink. In the [place] one drink equals one 12 oz bottle of beer (355 mL), one 5 oz glass of wine (148 mL), or one 1½ oz glass of hard liquor (44 mL).  General instructions  · Keep a weight-loss journal. This can help you keep track of:  ? The food that you eat.  ? How much exercise you get.  · Take over-the-counter and prescription medicines only as told by your doctor.  · Take vitamins and supplements only as told by your doctor.  · Think about joining a support group.  · Keep all follow-up visits as told by your doctor. This is important.  Contact a doctor if:  · You cannot meet your weight loss goal after you have changed your diet and lifestyle for 6 weeks.  Get help right away if you:  · Are having trouble breathing.  · Are having thoughts of harming yourself.  Summary  · Obesity is having too much body fat.  · Being obese means that your weight is more than what is healthy for you.  · Work with your doctor to set a weight-loss goal.  · Get regular exercise as told by your doctor.  This information is not intended to replace advice given to you by your health care provider. Make sure you discuss any questions you have with your health care provider.  Document Revised: 08/22/2019 Document Reviewed: 08/22/2019  Elsevier Patient Education © 2021 [employer]    Advance Care Planning and Advance Directives     You make decisions on a daily basis - decisions about where you want to live, your career, your home, your life. Perhaps one of the most important decisions you face is your choice for future medical care. Take time to talk with your family and your healthcare team and start planning today.  Advance Care Planning is a process that can help you:  · Understand possible future healthcare decisions in light of your own experiences  · Reflect on those decision in light of your goals and values  · Discuss your decisions with those closest to you and the healthcare professionals  that care for you  · Make a plan by creating a document that reflects your wishes    Surrogate Decision Maker  In the event of a medical emergency, which has left you unable to communicate or to make your own decisions, you would need someone to make decisions for you.  It is important to discuss your preferences for medical treatment with this person while you are in good health.     Qualities of a surrogate decision maker:  • Willing to take on this role and responsibility  • Knows what you want for future medical care  • Willing to follow your wishes even if they don't agree with them  • Able to make difficult medical decisions under stressful circumstances    Advance Directives  These are legal documents you can create that will guide your healthcare team and decision maker(s) when needed. These documents can be stored in the electronic medical record.    · Living Will - a legal document to guide your care if you have a terminal condition or a serious illness and are unable to communicate. States vary by statute in document names/types, but most forms may include one or more of the following:        -  Directions regarding life-prolonging treatments        -  Directions regarding artificially provided nutrition/hydration        -  Choosing a healthcare decision maker        -  Direction regarding organ/tissue donation    · Durable Power of  for Healthcare - this document names an -in-fact to make medical decisions for you, but it may also allow this person to make personal and financial decisions for you. Please seek the advice of an  if you need this type of document.    **Advance Directives are not required and no one may discriminate against you if you do not sign one.    Medical Orders  Many states allow specific forms/orders signed by your physician to record your wishes for medical treatment in your current state of health. This form, signed in personal communication with your  physician, addresses resuscitation and other medical interventions that you may or may not want.      For more information or to schedule a time with a [facility] Advance Care Planning Facilitator contact: [email or web address] or call [phone] and someone will contact you directly.

## 2021-07-27 ENCOUNTER — OFFICE VISIT (OUTPATIENT)
Dept: VASCULAR SURGERY | Facility: CLINIC | Age: 76
End: 2021-07-27

## 2021-07-27 VITALS
SYSTOLIC BLOOD PRESSURE: 136 MMHG | DIASTOLIC BLOOD PRESSURE: 72 MMHG | BODY MASS INDEX: 32.3 KG/M2 | OXYGEN SATURATION: 97 % | HEART RATE: 74 BPM | WEIGHT: 201 LBS | HEIGHT: 66 IN

## 2021-07-27 DIAGNOSIS — I10 BENIGN ESSENTIAL HTN: ICD-10-CM

## 2021-07-27 DIAGNOSIS — E78.2 MIXED HYPERLIPIDEMIA: ICD-10-CM

## 2021-07-27 DIAGNOSIS — I65.23 BILATERAL CAROTID ARTERY STENOSIS: Primary | ICD-10-CM

## 2021-07-27 PROCEDURE — 99204 OFFICE O/P NEW MOD 45 MIN: CPT | Performed by: SURGERY

## 2021-07-27 NOTE — PROGRESS NOTES
07/27/2021      Pradip Hoskins MD  [address] [facility]  [address]  [place] [address]    Marika Gutierrez  1945    Chief Complaint   Patient presents with   • Establish Care     new pt- carotid. pt states he has not had any stroke symptoms. pt is not diabetic.   • Med Management     verbally verified medication with pt.   • Nicotine Dependence     pt states he does not smoke.       Dear Pradip Hoskins MD    HPI  I had the pleasure of seeing your patient Marika Gutierrez in the office today.  Thank you kindly for this consultation.  As you recall, Marika Gutierrez is a 75 y.o.  male who you are currently following for coronary artery disease.  He denies any strokelike symptoms. He is maintained on Plavix and Lipitor.  He had noninvasive testing performed which I personally reviewed here today.      Past Medical History:   Diagnosis Date   • Aortic valve regurgitation    • Cancer (CMS/HCC)    • Carotid stenosis    • COPD (chronic obstructive pulmonary disease) (CMS/HCC)    • Coronary artery disease    • GERD (gastroesophageal reflux disease)    • Hyperlipidemia    • Hypertension    • Renal cell carcinoma (CMS/HCC)        Past Surgical History:   Procedure Laterality Date   • BACK SURGERY     • CARDIAC CATHETERIZATION      X 4 - 2010 - 2005 - 2003 - 1996   • CORONARY ARTERY BYPASS GRAFT      X 4   • EXTERNAL EAR SURGERY     • NEPHRECTOMY      LEFT - 2017       History reviewed. No pertinent family history.    Social History     Socioeconomic History   • Marital status: Significant Other     Spouse name: Not on file   • Number of children: Not on file   • Years of education: Not on file   • Highest education level: Not on file   Tobacco Use   • Smoking status: Former Smoker   • Smokeless tobacco: Former User   Vaping Use   • Vaping Use: Never used   Substance and Sexual Activity   • Alcohol use: Not Currently   • Drug use: Not Currently   • Sexual activity: Defer       Allergies   Allergen Reactions   • Cyclobenzaprine  Headache     Severe headache     • Hydrocodone-Acetaminophen Nausea And Vomiting   • Pentazocine Palpitations         Current Outpatient Medications:   •  amLODIPine (NORVASC) 10 MG tablet, , Disp: , Rfl:   •  atorvastatin (LIPITOR) 10 MG tablet, atorvastatin 10 mg tablet, Disp: , Rfl:   •  buPROPion SR (WELLBUTRIN SR) 150 MG 12 hr tablet, bupropion HCl  mg tablet,12 hr sustained-release, Disp: , Rfl:   •  citalopram (CeleXA) 20 MG tablet, citalopram 20 mg tablet, Disp: , Rfl:   •  clopidogrel (PLAVIX) 75 MG tablet, Take 1 tablet by mouth Every Other Day., Disp: 90 tablet, Rfl: 1  •  diazePAM (VALIUM) 5 MG tablet, diazepam 5 mg tablet, Disp: , Rfl:   •  DULOXETINE HCL PO, 30 mg Daily., Disp: , Rfl:   •  hydrALAZINE (APRESOLINE) 50 MG tablet, Take 1 tablet by mouth 2 (two) times a day., Disp: 180 tablet, Rfl: 3  •  isosorbide mononitrate (IMDUR) 60 MG 24 hr tablet, Take 1 tablet by mouth Daily., Disp: 90 tablet, Rfl: 3  •  nitroglycerin (NITROSTAT) 0.4 MG SL tablet, nitroglycerin 0.4 mg sublingual tablet, Disp: , Rfl:   •  pantoprazole (PROTONIX) 40 MG EC tablet, Daily., Disp: , Rfl:   •  predniSONE (DELTASONE) 5 MG tablet, prednisone 5 mg tablet, Disp: , Rfl:   •  tamsulosin (FLOMAX) 0.4 MG capsule 24 hr capsule, tamsulosin 0.4 mg capsule, Disp: , Rfl:   •  triamcinolone (KENALOG) 0.1 % cream, triamcinolone acetonide 0.1 % topical cream, Disp: , Rfl:     Review of Systems   Constitutional: Negative.    HENT: Negative.    Eyes: Negative.    Respiratory: Positive for shortness of breath.    Cardiovascular: Negative.    Gastrointestinal: Negative.    Endocrine: Negative.    Genitourinary: Negative.    Musculoskeletal: Negative.    Skin: Negative.    Allergic/Immunologic: Negative.    Neurological: Negative.    Hematological: Negative.    Psychiatric/Behavioral: Negative.    All other systems reviewed and are negative.    /72 (BP Location: Right arm, Patient Position: Sitting, Cuff Size: Adult)   Pulse 74   " Ht 167.6 cm (66\")   Wt 91.2 kg (201 lb)   SpO2 97%   BMI 32.44 kg/m²     Physical Exam  Vitals and nursing note reviewed.   Constitutional:       Appearance: Normal appearance. He is well-developed. He is obese.   HENT:      Head: Normocephalic and atraumatic.   Eyes:      General: No scleral icterus.     Pupils: Pupils are equal, round, and reactive to light.   Neck:      Thyroid: No thyromegaly.      Vascular: No carotid bruit or JVD.   Cardiovascular:      Rate and Rhythm: Normal rate and regular rhythm.      Pulses:           Carotid pulses are 2+ on the right side and 2+ on the left side.       Femoral pulses are 2+ on the right side and 2+ on the left side.       Popliteal pulses are 2+ on the right side and 2+ on the left side.        Dorsalis pedis pulses are 2+ on the right side and 2+ on the left side.        Posterior tibial pulses are 2+ on the right side and 2+ on the left side.      Heart sounds: Murmur heard.     Pulmonary:      Effort: Pulmonary effort is normal.      Breath sounds: Normal breath sounds.   Abdominal:      General: Bowel sounds are normal. There is no distension or abdominal bruit.      Palpations: Abdomen is soft. There is no mass.      Tenderness: There is no abdominal tenderness.   Musculoskeletal:         General: Normal range of motion.      Cervical back: Neck supple.   Lymphadenopathy:      Cervical: No cervical adenopathy.   Skin:     General: Skin is warm and dry.   Neurological:      General: No focal deficit present.      Mental Status: He is alert and oriented to person, place, and time.      Cranial Nerves: No cranial nerve deficit.      Sensory: No sensory deficit.   Psychiatric:         Mood and Affect: Mood normal.         Behavior: Behavior normal.         Thought Content: Thought content normal.         Judgment: Judgment normal.         Diagnostic Data:  CT ANGIOGRAM CAROTIDS- 6/24/2021 8:57 AM CDT     HISTORY: Carotid artery stenosis; I65.23-Occlusion and " stenosis of  bilateral carotid arteries      COMPARISON: None     DOSE LENGTH PRODUCT: 460  mGy cm. Automated exposure control was also  utilized to decrease patient radiation dose.     TECHNIQUE: Axial images the neck are obtained following IV contrast. 2-D  and maximal intensity projection images reconstructed and reviewed     FINDINGS:  Mild to moderate atherosclerotic plaque within the arch of  the aorta. Coronary artery bypass changes are noted. There is mild  calcification of the left subclavian artery creating less than 15%  stenosis. A normal calcification right brachiocephalic artery with no  significant luminal stenosis. No prominent right subclavian artery  stenosis.     Mild tortuosity of the patent left common carotid artery. There is  moderate densely calcified plaque left carotid bulb extending into the  left proximal internal carotid artery resulting in 68 % stenosis. Left  external carotid artery is patent.     The right common carotid artery is patent. Moderate densely calcified  plaque of the right carotid bulb extending into the right proximal  internal carotid artery resulting in 50% stenosis.     The vertebral arteries originate from the proximal subclavian arteries  as expected. Dominant right vertebral artery. Calcifications of the  distal vertebral arteries resulting in less than 50% stenosis. Prominent  tapering of the left distal vertebral artery. Small caliber basilar  artery.     Calcifications in the cavernous segments of the distal internal carotid  arteries resulting in less than 25% stenosis. Fetal origin of the right  posterior cerebral artery from the anterior circulation.     No pathologic lymphadenopathy or suspicious soft tissue mass identified  within the neck. Mucous secretions at the level of the vallecula.  Emphysematous changes within the visible lung apices. At least moderate  degenerative change of the cervical spine.     IMPRESSION:  1. Images interpreted with NASCET  criteria.  2. 70% stenosis of the proximal left internal carotid artery.  3. 50% stenosis of the proximal right internal carotid artery.  4. Dominant right vertebral artery with prominent tapering of the left  distal vertebral artery. Small caliber patent basilar artery.  5. Fetal origin right posterior cerebral artery from the anterior  circulation.  6. Coronary artery bypass surgical changes.  7. Emphysema.  This report was finalized on 06/24/2021 09:52 by Dr. Cynthia Jaime MD.    Patient Active Problem List   Diagnosis   • Coronary artery disease involving native coronary artery of native heart without angina pectoris   • S/P CABG (coronary artery bypass graft) 1996 [place]    • Mixed hyperlipidemia   • Benign essential HTN   • Family history of early CAD   • Ex-smoker for more than 1 year 1PPD x 40 years quit 16 years ago   • Nonrheumatic aortic valve insufficiency   • Class 1 obesity due to excess calories with serious comorbidity and body mass index (BMI) of 32.0 to 32.9 in adult        Diagnosis Plan   1. Bilateral carotid artery stenosis     2. Mixed hyperlipidemia     3. Benign essential HTN         Plan: After thoroughly evaluating Marika Gutierrez, I believe the best course of action is to proceed with a left carotid endarterectomy for stroke risk reduction.  Risks of carotid endarterectomy include, but are not limited to, bleeding, infection, vessel damage, nerve damage, MI, stroke, and death.  The patient understands these risks and would like to proceed with the procedure.  He will need cardiac clearance prior to the procedure.  Once he is cleared we will schedule him appropriately.  In the near future, he is likely going to need a right carotid endarterectomy due to heavy plaque burden and plaque ulceration present.  He can continue on his Plavix and does not need to stop it prior to the procedure. I did discuss vascular risk factors as they pertain to the progression of vascular disease  including controlling hypertension and hyperlipidemia.  These risk factors are currently controlled and stable.  The patient is to continue taking their medications as previously discussed.   This was all discussed in full with complete understanding.  Thank you for allowing me to participate in the care of your patient.  Please do not hesitate to call with any questions or concerns.  We will keep you aware of any further encounters with Marika Gutierrez.        Sincerely yours,         DO Juan Chauhan Jonathan Patmor, MD

## 2021-08-03 ENCOUNTER — DOCUMENTATION (OUTPATIENT)
Dept: CARDIOLOGY | Facility: CLINIC | Age: 76
End: 2021-08-03

## 2021-08-03 ENCOUNTER — PREP FOR SURGERY (OUTPATIENT)
Dept: OTHER | Facility: HOSPITAL | Age: 76
End: 2021-08-03

## 2021-08-03 DIAGNOSIS — I65.23 BILATERAL CAROTID ARTERY STENOSIS: Primary | ICD-10-CM

## 2021-08-03 DIAGNOSIS — Z01.818 PREOP TESTING: ICD-10-CM

## 2021-08-03 DIAGNOSIS — Z51.81 ENCOUNTER FOR MONITORING ANTIPLATELET THERAPY: ICD-10-CM

## 2021-08-03 DIAGNOSIS — Z79.02 ENCOUNTER FOR MONITORING ANTIPLATELET THERAPY: ICD-10-CM

## 2021-08-03 RX ORDER — BUPIVACAINE HCL/0.9 % NACL/PF 0.1 %
2 PLASTIC BAG, INJECTION (ML) EPIDURAL ONCE
Status: CANCELLED | OUTPATIENT
Start: 2021-08-03 | End: 2021-08-03

## 2021-08-03 NOTE — PROGRESS NOTES
"Risk assessment- from a revised cardiac risk index standpoint, patient is at moderate risk for a major cardiac event with carotid endarterectomy. This is primarily because he has a known history of coronary/ischemic heart disease, but no known history of congestive heart failure, cerebrovascular disease, insulin-dependent diabetes mellitus, or creatinine greater than 2. This correlates to a 6.6% estimated rate of myocardial infarction, pulmonary edema, ventricular fibrillation, cardiac arrest, or complete heart block. However, this is non-modifiable because he has no signs or symptoms of the following \"active cardiac conditions\"- acute coronary syndrome, acutely decompensated congestive heart failure, uncontrolled arrhythmias, or significant valvular disease. Therefore, recommend proceeding with the planned surgery without further delay.    "

## 2021-08-05 PROBLEM — Z01.818 PREOP TESTING: Status: ACTIVE | Noted: 2021-08-05

## 2021-08-05 PROBLEM — I65.23 BILATERAL CAROTID ARTERY STENOSIS: Status: ACTIVE | Noted: 2021-08-05

## 2021-08-12 ENCOUNTER — TRANSCRIBE ORDERS (OUTPATIENT)
Dept: LAB | Facility: HOSPITAL | Age: 76
End: 2021-08-12

## 2021-08-12 DIAGNOSIS — Z11.59 SCREENING FOR VIRAL DISEASE: Primary | ICD-10-CM

## 2021-08-16 ENCOUNTER — LAB (OUTPATIENT)
Dept: LAB | Facility: HOSPITAL | Age: 76
End: 2021-08-16

## 2021-08-16 ENCOUNTER — HOSPITAL ENCOUNTER (OUTPATIENT)
Dept: GENERAL RADIOLOGY | Facility: HOSPITAL | Age: 76
Discharge: HOME | End: 2021-08-16

## 2021-08-16 ENCOUNTER — PRE-ADMISSION TESTING (OUTPATIENT)
Dept: PREADMISSION TESTING | Facility: HOSPITAL | Age: 76
End: 2021-08-16

## 2021-08-16 VITALS
OXYGEN SATURATION: 97 % | SYSTOLIC BLOOD PRESSURE: 141 MMHG | DIASTOLIC BLOOD PRESSURE: 65 MMHG | HEIGHT: 68 IN | RESPIRATION RATE: 18 BRPM | HEART RATE: 69 BPM | BODY MASS INDEX: 30.61 KG/M2 | WEIGHT: 201.94 LBS

## 2021-08-16 DIAGNOSIS — I65.23 BILATERAL CAROTID ARTERY STENOSIS: ICD-10-CM

## 2021-08-16 DIAGNOSIS — Z51.81 ENCOUNTER FOR MONITORING ANTIPLATELET THERAPY: ICD-10-CM

## 2021-08-16 DIAGNOSIS — Z79.02 ENCOUNTER FOR MONITORING ANTIPLATELET THERAPY: ICD-10-CM

## 2021-08-16 DIAGNOSIS — Z01.818 PREOP TESTING: ICD-10-CM

## 2021-08-16 LAB
ABO GROUP BLD: NORMAL
ANION GAP SERPL CALCULATED.3IONS-SCNC: 7 MMOL/L (ref 5–15)
APTT PPP: 30.9 SECONDS (ref 24.1–35)
BASOPHILS # BLD AUTO: 0.04 10*3/MM3 (ref 0–0.2)
BASOPHILS NFR BLD AUTO: 0.6 % (ref 0–1.5)
BLD GP AB SCN SERPL QL: NEGATIVE
BUN SERPL-MCNC: 22 MG/DL (ref 8–23)
BUN/CREAT SERPL: 16.9 (ref 7–25)
CALCIUM SPEC-SCNC: 9.5 MG/DL (ref 8.6–10.5)
CHLORIDE SERPL-SCNC: 101 MMOL/L (ref 98–107)
CO2 SERPL-SCNC: 26 MMOL/L (ref 22–29)
CREAT SERPL-MCNC: 1.3 MG/DL (ref 0.76–1.27)
DEPRECATED RDW RBC AUTO: 40.2 FL (ref 37–54)
EOSINOPHIL # BLD AUTO: 0.32 10*3/MM3 (ref 0–0.4)
EOSINOPHIL NFR BLD AUTO: 4.5 % (ref 0.3–6.2)
ERYTHROCYTE [DISTWIDTH] IN BLOOD BY AUTOMATED COUNT: 13 % (ref 12.3–15.4)
GFR SERPL CREATININE-BSD FRML MDRD: 54 ML/MIN/1.73
GLUCOSE SERPL-MCNC: 105 MG/DL (ref 65–99)
HCT VFR BLD AUTO: 41.6 % (ref 37.5–51)
HGB BLD-MCNC: 14.3 G/DL (ref 13–17.7)
IMM GRANULOCYTES # BLD AUTO: 0.05 10*3/MM3 (ref 0–0.05)
IMM GRANULOCYTES NFR BLD AUTO: 0.7 % (ref 0–0.5)
INR PPP: 1.07 (ref 0.91–1.09)
LYMPHOCYTES # BLD AUTO: 1.55 10*3/MM3 (ref 0.7–3.1)
LYMPHOCYTES NFR BLD AUTO: 22 % (ref 19.6–45.3)
MCH RBC QN AUTO: 29 PG (ref 26.6–33)
MCHC RBC AUTO-ENTMCNC: 34.4 G/DL (ref 31.5–35.7)
MCV RBC AUTO: 84.4 FL (ref 79–97)
MONOCYTES # BLD AUTO: 0.71 10*3/MM3 (ref 0.1–0.9)
MONOCYTES NFR BLD AUTO: 10.1 % (ref 5–12)
NEUTROPHILS NFR BLD AUTO: 4.39 10*3/MM3 (ref 1.7–7)
NEUTROPHILS NFR BLD AUTO: 62.1 % (ref 42.7–76)
NRBC BLD AUTO-RTO: 0 /100 WBC (ref 0–0.2)
PLATELET # BLD AUTO: 210 10*3/MM3 (ref 140–450)
PMV BLD AUTO: 9.5 FL (ref 6–12)
POTASSIUM SERPL-SCNC: 4.4 MMOL/L (ref 3.5–5.2)
PROTHROMBIN TIME: 13.1 SECONDS (ref 11.5–13.4)
RBC # BLD AUTO: 4.93 10*6/MM3 (ref 4.14–5.8)
RH BLD: POSITIVE
SARS-COV-2 ORF1AB RESP QL NAA+PROBE: NOT DETECTED
SODIUM SERPL-SCNC: 134 MMOL/L (ref 136–145)
T&S EXPIRATION DATE: NORMAL
WBC # BLD AUTO: 7.06 10*3/MM3 (ref 3.4–10.8)

## 2021-08-16 PROCEDURE — 85025 COMPLETE CBC W/AUTO DIFF WBC: CPT

## 2021-08-16 PROCEDURE — 86850 RBC ANTIBODY SCREEN: CPT | Performed by: NURSE PRACTITIONER

## 2021-08-16 PROCEDURE — 71046 X-RAY EXAM CHEST 2 VIEWS: CPT

## 2021-08-16 PROCEDURE — 80048 BASIC METABOLIC PNL TOTAL CA: CPT

## 2021-08-16 PROCEDURE — 86901 BLOOD TYPING SEROLOGIC RH(D): CPT | Performed by: NURSE PRACTITIONER

## 2021-08-16 PROCEDURE — 85610 PROTHROMBIN TIME: CPT

## 2021-08-16 PROCEDURE — C9803 HOPD COVID-19 SPEC COLLECT: HCPCS | Performed by: SURGERY

## 2021-08-16 PROCEDURE — 85730 THROMBOPLASTIN TIME PARTIAL: CPT

## 2021-08-16 PROCEDURE — 86900 BLOOD TYPING SEROLOGIC ABO: CPT | Performed by: NURSE PRACTITIONER

## 2021-08-16 PROCEDURE — 36415 COLL VENOUS BLD VENIPUNCTURE: CPT

## 2021-08-16 PROCEDURE — U0004 COV-19 TEST NON-CDC HGH THRU: HCPCS | Performed by: SURGERY

## 2021-08-16 NOTE — DISCHARGE INSTRUCTIONS
DAY OF SURGERY INSTRUCTIONS        YOUR SURGEON: Puneet Chang    PROCEDURE: Left Carotid Endarterectomy with Electroencephalogram     DATE OF SURGERY: August 18, 2021     ARRIVAL TIME: AS DIRECTED BY OFFICE    YOU MAY TAKE THE FOLLOWING MEDICATION(S) THE MORNING OF SURGERY WITH A SIP OF WATER: none      ALL OTHER HOME MEDICATION CHECK WITH YOUR PHYSICIAN      DO NOT TAKE ANY ERECTILE DYSFUNCTION MEDICATIONS (EX: CIALIS, VIAGRA) 24 HOURS PRIOR TO SURGERY                      MANAGING PAIN AFTER SURGERY    We know you are probably wondering what your pain will be like after surgery.  Following surgery it is unrealistic to expect you will not have pain.   Pain is how our bodies let us know that something is wrong or cautions us to be careful.  That said, our goal is to make your pain tolerable.    Methods we may use to treat your pain include (oral or IV medications, PCAs, epidurals, nerve blocks, etc.)   While some procedures require IV pain medications for a short time after surgery, transitioning to pain medications by mouth allows for better management of pain.   Your nurse will encourage you to take oral pain medications whenever possible.  IV medications work almost immediately, but only last a short while.  Taking medications by mouth allows for a more constant level of medication in your blood stream for a longer period of time.      Once your pain is out of control it is harder to get back under control.  It is important you are aware when your next dose of pain medication is due.  If you are admitted, your nurse may write the time of your next dose on the white board in your room to help you remember.      We are interested in your pain and encourage you to inform us about aggravating factors during your visit.   Many times a simple repositioning every few hours can make a big difference.    If your physician says it is okay, do not let your pain prevent you from getting out of bed. Be sure to call your  nurse for assistance prior to getting up so you do not fall.      Before surgery, please decide your tolerable pain goal.  These faces help describe the pain ratings we use on a 0-10 scale.   Be prepared to tell us your goal and whether or not you take pain or anxiety medications at home.          BEFORE YOU COME TO THE HOSPITAL  (Pre-op instructions)  • Do not eat, drink, smoke or chew gum after midnight the night before surgery.  This also includes no mints.  • Morning of surgery take only the medicines you have been instructed with a sip of water unless otherwise instructed  by your physician.  • Do not shave, wear makeup or dark nail polish.  • Remove all jewelry including rings.  • Leave anything you consider valuable at home.  • Leave your suitcase in the car until after your surgery.  • Bring the following with you if applicable:  o Picture ID and insurance, Medicare or Medicaid cards  o Co-pay/deductible required by insurance (cash, check, credit card)  o Copy of advance directive, living will or power-of- documents if not brought to PAT  o CPAP or BIPAP mask and tubing  o Relaxation aids ( book, magazine), etc.  o Hearing aids                        ON THE DAY OF SURGERY  · On the day of surgery check in at registration located at the main entrance of the hospital.   ? You will be registered and given a beeper with instructions where to wait in the main lobby.  ? When your beeper lights up and vibrates a member of the Outpatient Surgery staff will meet you at the double doors under the stair steps and escort you to your preoperative room.   · You may have cloth compression devices placed on your legs. These help to prevent blood clots and reduce swelling in your legs.  · An IV may be inserted into one of your veins.  · In the operating room, you may be given one or more of the following:  ? A medicine to help you relax (sedative).  ? A medicine to numb the area (local anesthetic).  ? A medicine to  "make you fall asleep (general anesthetic).  ? A medicine that is injected into an area of your body to numb everything below the injection site (regional anesthetic).  · Your surgical site will be marked or identified.  · You may be given an antibiotic through your IV to help prevent infection.  Contact a health care provider if you:  · Develop a fever of more than 100.4°F (38°C) or other feelings of illness during the 48 hours before your surgery.  · Have symptoms that get worse.  Have questions or concerns about your surgery    General Anesthesia/Surgery, Adult  General anesthesia is the use of medicines to make a person \"go to sleep\" (unconscious) for a medical procedure. General anesthesia must be used for certain procedures, and is often recommended for procedures that:  · Last a long time.  · Require you to be still or in an unusual position.  · Are major and can cause blood loss.  The medicines used for general anesthesia are called general anesthetics. As well as making you unconscious for a certain amount of time, these medicines:  · Prevent pain.  · Control your blood pressure.  · Relax your muscles.  Tell a health care provider about:  · Any allergies you have.  · All medicines you are taking, including vitamins, herbs, eye drops, creams, and over-the-counter medicines.  · Any problems you or family members have had with anesthetic medicines.  · Types of anesthetics you have had in the past.  · Any blood disorders you have.  · Any surgeries you have had.  · Any medical conditions you have.  · Any recent upper respiratory, chest, or ear infections.  · Any history of:  ? Heart or lung conditions, such as heart failure, sleep apnea, asthma, or chronic obstructive pulmonary disease (COPD).  ?  service.  ? Depression or anxiety.  · Any tobacco or drug use, including marijuana or alcohol use.  · Whether you are pregnant or may be pregnant.  What are the risks?  Generally, this is a safe procedure. " However, problems may occur, including:  · Allergic reaction.  · Lung and heart problems.  · Inhaling food or liquid from the stomach into the lungs (aspiration).  · Nerve injury.  · Air in the bloodstream, which can lead to stroke.  · Extreme agitation or confusion (delirium) when you wake up from the anesthetic.  · Waking up during your procedure and being unable to move. This is rare.  These problems are more likely to develop if you are having a major surgery or if you have an advanced or serious medical condition. You can prevent some of these complications by answering all of your health care provider's questions thoroughly and by following all instructions before your procedure.  General anesthesia can cause side effects, including:  · Nausea or vomiting.  · A sore throat from the breathing tube.  · Hoarseness.  · Wheezing or coughing.  · Shaking chills.  · Tiredness.  · Body aches.  · Anxiety.  · Sleepiness or drowsiness.  · Confusion or agitation.  RISKS AND COMPLICATIONS OF SURGERY  Your health care provider will discuss possible risks and complications with you before surgery. Common risks and complications include:    · Problems due to the use of anesthetics.  · Blood loss and replacement (does not apply to minor surgical procedures).  · Temporary increase in pain due to surgery.  · Uncorrected pain or problems that the surgery was meant to correct.  · Infection.  · New damage.    What happens before the procedure?    Medicines  Ask your health care provider about:  · Changing or stopping your regular medicines. This is especially important if you are taking diabetes medicines or blood thinners.  · Taking medicines such as aspirin and ibuprofen. These medicines can thin your blood. Do not take these medicines unless your health care provider tells you to take them.  · Taking over-the-counter medicines, vitamins, herbs, and supplements. Do not take these during the week before your procedure unless your  health care provider approves them.  General instructions  · Starting 3-6 weeks before the procedure, do not use any products that contain nicotine or tobacco, such as cigarettes and e-cigarettes. If you need help quitting, ask your health care provider.  · If you brush your teeth on the morning of the procedure, make sure to spit out all of the toothpaste.  · Tell your health care provider if you become ill or develop a cold, cough, or fever.  · If instructed by your health care provider, bring your sleep apnea device with you on the day of your surgery (if applicable).  · Ask your health care provider if you will be going home the same day, the following day, or after a longer hospital stay.  ? Plan to have someone take you home from the hospital or clinic.  ? Plan to have a responsible adult care for you for at least 24 hours after you leave the hospital or clinic. This is important.  What happens during the procedure?  · You will be given anesthetics through both of the following:  ? A mask placed over your nose and mouth.  ? An IV in one of your veins.  · You may receive a medicine to help you relax (sedative).  · After you are unconscious, a breathing tube may be inserted down your throat to help you breathe. This will be removed before you wake up.  · An anesthesia specialist will stay with you throughout your procedure. He or she will:  ? Keep you comfortable and safe by continuing to give you medicines and adjusting the amount of medicine that you get.  ? Monitor your blood pressure, pulse, and oxygen levels to make sure that the anesthetics do not cause any problems.  The procedure may vary among health care providers and hospitals.  What happens after the procedure?  · Your blood pressure, temperature, heart rate, breathing rate, and blood oxygen level will be monitored until the medicines you were given have worn off.  · You will wake up in a recovery area. You may wake up slowly.  · If you feel anxious  or agitated, you may be given medicine to help you calm down.  · If you will be going home the same day, your health care provider may check to make sure you can walk, drink, and urinate.  · Your health care provider will treat any pain or side effects you have before you go home.  · Do not drive for 24 hours if you were given a sedative.  Summary  · General anesthesia is used to keep you still and prevent pain during a procedure.  · It is important to tell your healthcare provider about your medical history and any surgeries you have had, and previous experience with anesthesia.  · Follow your healthcare provider’s instructions about when to stop eating, drinking, or taking certain medicines before your procedure.  · Plan to have someone take you home from the hospital or clinic.  This information is not intended to replace advice given to you by your health care provider. Make sure you discuss any questions you have with your health care provider.  Document Released: 03/26/2009 Document Revised: 08/03/2018 Document Reviewed: 08/03/2018  [employer] Interactive Patient Education © 2019 [employer]       Fall Prevention in Hospitals, Adult  As a hospital patient, your condition and the treatments you receive can increase your risk for falls. Some additional risk factors for falls in a hospital include:  · Being in an unfamiliar environment.  · Being on bed rest.  · Your surgery.  · Taking certain medicines.  · Your tubing requirements, such as intravenous (IV) therapy or catheters.  It is important that you learn how to decrease fall risks while at the hospital. Below are important tips that can help prevent falls.  SAFETY TIPS FOR PREVENTING FALLS  Talk about your risk of falling.  · Ask your health care provider why you are at risk for falling. Is it your medicine, illness, tubing placement, or something else?  · Make a plan with your health care provider to keep you safe from falls.  · Ask your health care  provider or pharmacist about side effects of your medicines. Some medicines can make you dizzy or affect your coordination.  Ask for help.  · Ask for help before getting out of bed. You may need to press your call button.  · Ask for assistance in getting safely to the toilet.  · Ask for a walker or cane to be put at your bedside. Ask that most of the side rails on your bed be placed up before your health care provider leaves the room.  · Ask family or friends to sit with you.  · Ask for things that are out of your reach, such as your glasses, hearing aids, telephone, bedside table, or call button.  Follow these tips to avoid falling:  · Stay lying or seated, rather than standing, while waiting for help.  · Wear rubber-soled slippers or shoes whenever you walk in the hospital.  · Avoid quick, sudden movements.  ¨ Change positions slowly.  ¨ Sit on the side of your bed before standing.  ¨ Stand up slowly and wait before you start to walk.  · Let your health care provider know if there is a spill on the floor.  · Pay careful attention to the medical equipment, electrical cords, and tubes around you.  · When you need help, use your call button by your bed or in the bathroom. Wait for one of your health care providers to help you.  · If you feel dizzy or unsure of your footing, return to bed and wait for assistance.  · Avoid being distracted by the TV, telephone, or another person in your room.  · Do not lean or support yourself on rolling objects, such as IV poles or bedside tables.     This information is not intended to replace advice given to you by your health care provider. Make sure you discuss any questions you have with your health care provider.     Document Released: 12/15/2001 Document Revised: 01/08/2016 Document Reviewed: 08/25/2013  [employer] Interactive Patient Education ©2016 [employer]       [facility] 4% Patient Instruction Sheet    Chlorhexidine Before Surgery  Chlorhexidine  gluconate (CHG) is a germ-killing (antiseptic) solution that is used to clean the skin. It gets rid of the bacteria that normally live on the skin. Cleaning your skin with CHG before surgery helps lower the risk for infection after surgery.    How to use CHG solution  · You will take 2 showers, one shower the night before surgery, the second shower the morning of surgery before coming to the hospital.  · Use CHG only as told by your health care provider, and follow the instructions on the label.  · Use CHG solution while taking a shower. Follow these steps when using CHG solution (unless your health care provider gives you different instructions):  1. Start the shower.  2. Use your normal soap and shampoo to wash your face and hair.  3. Turn off the shower or move out of the shower stream.  4. Pour the CHG onto a clean washcloth. Do not use any type of brush or rough-edged sponge.  5. Starting at your neck, lather your body down to your toes. Make sure you:  6. Pay special attention to the part of your body where you will be having surgery. Scrub this area for at least 1 minute.  7. Use the full amount of CHG as directed. Usually, this is one half bottle for each shower.  8. Do not use CHG on your head or face. If the solution gets into your ears or eyes, rinse them well with water.  9. Avoid your genital area.  10. Avoid any areas of skin that have broken skin, cuts, or scrapes.  11. Scrub your back and under your arms. Make sure to wash skin folds.  12. Let the lather sit on your skin for 1-2 minutes or as long as told by your health care  provider.  13. Thoroughly rinse your entire body in the shower. Make sure that all body creases and crevices are rinsed well.  14. Dry off with a clean towel. Do not put any substances on your body afterward, such as powder, lotion, or perfume.  15. Put on clean clothes or pajamas.  16. If it is the night before your surgery, sleep in clean sheets.    What are the risks?  Risks  of using CHG include:  · A skin reaction.  · Hearing loss, if CHG gets in your ears.  · Eye injury, if CHG gets in your eyes and is not rinsed out.  · The CHG product catching fire.  Make sure that you avoid smoking and flames after applying CHG to your skin.  Do not use CHG:  · If you have a chlorhexidine allergy or have previously reacted to chlorhexidine.  · On babies younger than 2 months of age.      On the day of surgery, when you are taken to your room in Outpatient Surgery you will be given a CHG prepackaged cloth to wipe the site for your surgery.  How to use CHG prepackaged cloths  · Follow the instructions on the label.  · Use the CHG cloth on clean, dry skin. Follow these steps when using a CHG cloth (unless your health care provider gives you different instructions):  1. Using the CHG cloth, vigorously scrub the part of your body where you will be having surgery. Scrub using a back-and-forth motion for 3 minutes. The area on your body should be completely wet with CHG when you are finished scrubbing.  2. Do not rinse. Discard the cloth and let the area air-dry for 1 minute. Do not put any substances on your body afterward, such as powder, lotion, or perfume.  Contact a health care provider if:  · Your skin gets irritated after scrubbing.  · You have questions about using your solution or cloth.  Get help right away if:  · Your eyes become very red or swollen.  · Your eyes itch badly.  · Your skin itches badly and is red or swollen.  · Your hearing changes.  · You have trouble seeing.  · You have swelling or tingling in your mouth or throat.  · You have trouble breathing.  · You swallow any chlorhexidine.  Summary  · Chlorhexidine gluconate (CHG) is a germ-killing (antiseptic) solution that is used to clean the skin. Cleaning your skin with CHG before surgery helps lower the risk for infection after surgery.  · You may be given CHG to use at home. It may be in a bottle or in a prepackaged cloth to use on  your skin. Carefully follow your health care provider's instructions and the instructions on the product label.  · Do not use CHG if you have a chlorhexidine allergy.  · Contact your health care provider if your skin gets irritated after scrubbing.  This information is not intended to replace advice given to you by your health care provider. Make sure you discuss any questions you have with your health care provider.  Document Released: 09/11/2013 Document Revised: 11/15/2018 Document Reviewed: 11/15/2018  [employer] Interactive Patient Education © 2019 [employer]          PATIENT/FAMILY/RESPONSIBLE PARTY VERBALIZES UNDERSTANDING OF ABOVE EDUCATION.  COPY OF PAIN SCALE GIVEN AND REVIEWED WITH VERBALIZED UNDERSTANDING.

## 2021-08-17 ENCOUNTER — TELEPHONE (OUTPATIENT)
Dept: VASCULAR SURGERY | Facility: CLINIC | Age: 76
End: 2021-08-17

## 2021-08-17 NOTE — H&P
2021         Pradip Hoskins MD  [address] [facility]  [address]  [place] [address]     Marika Gutierrez  1945          Chief Complaint   Patient presents with   • Establish Care       new pt- carotid. pt states he has not had any stroke symptoms. pt is not diabetic.   • Med Management       verbally verified medication with pt.   • Nicotine Dependence       pt states he does not smoke.         Dear Pradip Hoskins MD     HPI  I had the pleasure of seeing your patient Marika Gutierrez in the office today.  Thank you kindly for this consultation.  As you recall, Marika Gutierrez is a 75 y.o.  male who you are currently following for coronary artery disease.  He denies any strokelike symptoms. He is maintained on Plavix and Lipitor.  He had noninvasive testing performed which I personally reviewed here today.        Past Medical History:   Diagnosis Date   • Aortic valve regurgitation    • Cancer (CMS/HCC)    • Carotid stenosis    • Cataract     right   • COPD (chronic obstructive pulmonary disease) (CMS/HCC)    • Coronary artery disease    • GERD (gastroesophageal reflux disease)    • Hard of hearing    • Hyperlipidemia    • Hypertension    • PONV (postoperative nausea and vomiting)    • Renal cell carcinoma (CMS/HCC)    • TIA (transient ischemic attack)      Past Surgical History:   Procedure Laterality Date   • BACK SURGERY     • CARDIAC CATHETERIZATION      X 4 -  - 2005 -  -    • CORONARY ARTERY BYPASS GRAFT      X 4   • EXTERNAL EAR SURGERY     • NEPHRECTOMY      LEFT - 2017     No family history on file.  Social History     Tobacco Use   • Smoking status: Former Smoker     Packs/day: 1.00     Years: 25.00     Pack years: 25.00     Types: Cigarettes     Quit date:      Years since quittin.6   • Smokeless tobacco: Former User     Types: Chew   Vaping Use   • Vaping Use: Never used   Substance Use Topics   • Alcohol use: Not Currently   • Drug use: Not Currently     Allergies   Allergen  "Reactions   • Cyclobenzaprine Headache     Severe headache     • Hydrocodone-Acetaminophen Nausea And Vomiting   • Pentazocine Palpitations     Current Outpatient Medications   Medication Instructions   • amLODIPine (NORVASC) 10 mg, Oral, Daily   • atorvastatin (LIPITOR) 10 mg, Oral, Daily   • buPROPion SR (WELLBUTRIN SR) 150 mg, Oral, Daily   • citalopram (CELEXA) 20 mg, Oral, Daily   • clopidogrel (PLAVIX) 75 mg, Oral, Every Other Day   • diazePAM (VALIUM) 5 mg, Oral, Nightly   • DULOXETINE HCL PO 30 mg, Daily   • hydrALAZINE (APRESOLINE) 50 mg, Oral, 2 times daily   • isosorbide mononitrate (IMDUR) 60 mg, Oral, Daily   • nitroglycerin (NITROSTAT) 0.4 mg, Sublingual, Every 5 Minutes PRN   • pantoprazole (PROTONIX) 40 mg, Oral, Daily   • predniSONE (DELTASONE) 5 mg, Oral, Take As Directed, Every other week   • tamsulosin (FLOMAX) 0.4 MG capsule 24 hr capsule 1 capsule, Oral, Daily   • triamcinolone (KENALOG) 0.1 % cream triamcinolone acetonide 0.1 % topical cream          Review of Systems   Constitutional: Negative.    HENT: Negative.    Eyes: Negative.    Respiratory: Positive for shortness of breath.    Cardiovascular: Negative.    Gastrointestinal: Negative.    Endocrine: Negative.    Genitourinary: Negative.    Musculoskeletal: Negative.    Skin: Negative.    Allergic/Immunologic: Negative.    Neurological: Negative.    Hematological: Negative.    Psychiatric/Behavioral: Negative.    All other systems reviewed and are negative.     /72 (BP Location: Right arm, Patient Position: Sitting, Cuff Size: Adult)   Pulse 74   Ht 167.6 cm (66\")   Wt 91.2 kg (201 lb)   SpO2 97%   BMI 32.44 kg/m²      Physical Exam  Vitals and nursing note reviewed.   Constitutional:       Appearance: Normal appearance. He is well-developed. He is obese.   HENT:      Head: Normocephalic and atraumatic.   Eyes:      General: No scleral icterus.     Pupils: Pupils are equal, round, and reactive to light.   Neck:      Thyroid: No " thyromegaly.      Vascular: No carotid bruit or JVD.   Cardiovascular:      Rate and Rhythm: Normal rate and regular rhythm.      Pulses:           Carotid pulses are 2+ on the right side and 2+ on the left side.       Femoral pulses are 2+ on the right side and 2+ on the left side.       Popliteal pulses are 2+ on the right side and 2+ on the left side.        Dorsalis pedis pulses are 2+ on the right side and 2+ on the left side.        Posterior tibial pulses are 2+ on the right side and 2+ on the left side.      Heart sounds: Murmur heard.     Pulmonary:      Effort: Pulmonary effort is normal.      Breath sounds: Normal breath sounds.   Abdominal:      General: Bowel sounds are normal. There is no distension or abdominal bruit.      Palpations: Abdomen is soft. There is no mass.      Tenderness: There is no abdominal tenderness.   Musculoskeletal:         General: Normal range of motion.      Cervical back: Neck supple.   Lymphadenopathy:      Cervical: No cervical adenopathy.   Skin:     General: Skin is warm and dry.   Neurological:      General: No focal deficit present.      Mental Status: He is alert and oriented to person, place, and time.      Cranial Nerves: No cranial nerve deficit.      Sensory: No sensory deficit.   Psychiatric:         Mood and Affect: Mood normal.         Behavior: Behavior normal.         Thought Content: Thought content normal.         Judgment: Judgment normal.            Diagnostic Data:  CT ANGIOGRAM CAROTIDS- 6/24/2021 8:57 AM CDT     HISTORY: Carotid artery stenosis; I65.23-Occlusion and stenosis of  bilateral carotid arteries      COMPARISON: None     DOSE LENGTH PRODUCT: 460  mGy cm. Automated exposure control was also  utilized to decrease patient radiation dose.     TECHNIQUE: Axial images the neck are obtained following IV contrast. 2-D  and maximal intensity projection images reconstructed and reviewed     FINDINGS:  Mild to moderate atherosclerotic plaque within the  arch of  the aorta. Coronary artery bypass changes are noted. There is mild  calcification of the left subclavian artery creating less than 15%  stenosis. A normal calcification right brachiocephalic artery with no  significant luminal stenosis. No prominent right subclavian artery  stenosis.     Mild tortuosity of the patent left common carotid artery. There is  moderate densely calcified plaque left carotid bulb extending into the  left proximal internal carotid artery resulting in 68 % stenosis. Left  external carotid artery is patent.     The right common carotid artery is patent. Moderate densely calcified  plaque of the right carotid bulb extending into the right proximal  internal carotid artery resulting in 50% stenosis.     The vertebral arteries originate from the proximal subclavian arteries  as expected. Dominant right vertebral artery. Calcifications of the  distal vertebral arteries resulting in less than 50% stenosis. Prominent  tapering of the left distal vertebral artery. Small caliber basilar  artery.     Calcifications in the cavernous segments of the distal internal carotid  arteries resulting in less than 25% stenosis. Fetal origin of the right  posterior cerebral artery from the anterior circulation.     No pathologic lymphadenopathy or suspicious soft tissue mass identified  within the neck. Mucous secretions at the level of the vallecula.  Emphysematous changes within the visible lung apices. At least moderate  degenerative change of the cervical spine.     IMPRESSION:  1. Images interpreted with NASCET criteria.  2. 70% stenosis of the proximal left internal carotid artery.  3. 50% stenosis of the proximal right internal carotid artery.  4. Dominant right vertebral artery with prominent tapering of the left  distal vertebral artery. Small caliber patent basilar artery.  5. Fetal origin right posterior cerebral artery from the anterior  circulation.  6. Coronary artery bypass surgical  changes.  7. Emphysema.  This report was finalized on 06/24/2021 09:52 by Dr. Cynthia Jaime MD.         Patient Active Problem List   Diagnosis   • Coronary artery disease involving native coronary artery of native heart without angina pectoris   • S/P CABG (coronary artery bypass graft) [place]    • Mixed hyperlipidemia   • Benign essential HTN   • Family history of early CAD   • Ex-smoker for more than 1 year 1PPD x 40 years quit 16 years ago   • Nonrheumatic aortic valve insufficiency   • Class 1 obesity due to excess calories with serious comorbidity and body mass index (BMI) of 32.0 to 32.9 in adult           Diagnosis Plan   1. Bilateral carotid artery stenosis      2. Mixed hyperlipidemia      3. Benign essential HTN            Plan: After thoroughly evaluating Marika Gutierrez, I believe the best course of action is to proceed with a left carotid endarterectomy for stroke risk reduction.  Risks of carotid endarterectomy include, but are not limited to, bleeding, infection, vessel damage, nerve damage, MI, stroke, and death.  The patient understands these risks and would like to proceed with the procedure.  We did receive cardiac clearance from Ronit CARCAMO.  In the near future, he is likely going to need a right carotid endarterectomy due to heavy plaque burden and plaque ulceration present.  He can continue on his Plavix and does not need to stop it prior to the procedure. I did discuss vascular risk factors as they pertain to the progression of vascular disease including controlling hypertension and hyperlipidemia.  These risk factors are currently controlled and stable.  The patient is to continue taking their medications as previously discussed.   This was all discussed in full with complete understanding.  Thank you for allowing me to participate in the care of your patient.  Please do not hesitate to call with any questions or concerns.  We will keep you aware of any further  encounters with Marika Gutierrez.         Sincerely yours,           DO Juan Chauhan, Abilio Solis MD

## 2021-08-17 NOTE — TELEPHONE ENCOUNTER
Spoke with patient wife and advised that patient arrival time had been moved to 600 am.  Patient wife expressed understanding for all that was discussed.

## 2021-08-18 ENCOUNTER — ANESTHESIA EVENT (OUTPATIENT)
Dept: PERIOP | Facility: HOSPITAL | Age: 76
End: 2021-08-18

## 2021-08-18 ENCOUNTER — HOSPITAL ENCOUNTER (INPATIENT)
Facility: HOSPITAL | Age: 76
LOS: 1 days | Discharge: HOME | End: 2021-08-19
Attending: SURGERY | Admitting: SURGERY

## 2021-08-18 ENCOUNTER — APPOINTMENT (OUTPATIENT)
Dept: ULTRASOUND IMAGING | Facility: HOSPITAL | Age: 76
End: 2021-08-18

## 2021-08-18 ENCOUNTER — ANESTHESIA (OUTPATIENT)
Dept: PERIOP | Facility: HOSPITAL | Age: 76
End: 2021-08-18

## 2021-08-18 DIAGNOSIS — I65.23 BILATERAL CAROTID ARTERY STENOSIS: ICD-10-CM

## 2021-08-18 DIAGNOSIS — Z01.818 PREOP TESTING: ICD-10-CM

## 2021-08-18 PROCEDURE — 4A10X4Z MONITORING OF CENTRAL NERVOUS ELECTRICAL ACTIVITY, EXTERNAL APPROACH: ICD-10-PCS | Performed by: SURGERY

## 2021-08-18 PROCEDURE — 25010000002 PHENYLEPHRINE 10 MG/ML SOLUTION 5 ML VIAL: Performed by: NURSE ANESTHETIST, CERTIFIED REGISTERED

## 2021-08-18 PROCEDURE — 25010000002 ONDANSETRON PER 1 MG: Performed by: ANESTHESIOLOGY

## 2021-08-18 PROCEDURE — 25010000002 CEFAZOLIN PER 500 MG: Performed by: NURSE PRACTITIONER

## 2021-08-18 PROCEDURE — C1768 GRAFT, VASCULAR: HCPCS | Performed by: SURGERY

## 2021-08-18 PROCEDURE — 03CL0ZZ EXTIRPATION OF MATTER FROM LEFT INTERNAL CAROTID ARTERY, OPEN APPROACH: ICD-10-PCS | Performed by: SURGERY

## 2021-08-18 PROCEDURE — 25010000002 HEPARIN (PORCINE) PER 1000 UNITS: Performed by: SURGERY

## 2021-08-18 PROCEDURE — 94799 UNLISTED PULMONARY SVC/PX: CPT

## 2021-08-18 PROCEDURE — 25010000002 CEFAZOLIN PER 500 MG: Performed by: SURGERY

## 2021-08-18 PROCEDURE — 25010000002 HEPARIN (PORCINE) PER 1000 UNITS: Performed by: NURSE ANESTHETIST, CERTIFIED REGISTERED

## 2021-08-18 PROCEDURE — 03UL0KZ SUPPLEMENT LEFT INTERNAL CAROTID ARTERY WITH NONAUTOLOGOUS TISSUE SUBSTITUTE, OPEN APPROACH: ICD-10-PCS | Performed by: SURGERY

## 2021-08-18 PROCEDURE — 25010000002 PROPOFOL 10 MG/ML EMULSION: Performed by: NURSE ANESTHETIST, CERTIFIED REGISTERED

## 2021-08-18 PROCEDURE — 93882 EXTRACRANIAL UNI/LTD STUDY: CPT

## 2021-08-18 PROCEDURE — 25010000002 DEXAMETHASONE PER 1 MG: Performed by: NURSE ANESTHETIST, CERTIFIED REGISTERED

## 2021-08-18 PROCEDURE — 93882 EXTRACRANIAL UNI/LTD STUDY: CPT | Performed by: SURGERY

## 2021-08-18 PROCEDURE — 35301 RECHANNELING OF ARTERY: CPT | Performed by: SURGERY

## 2021-08-18 PROCEDURE — 25010000002 FENTANYL CITRATE (PF) 100 MCG/2ML SOLUTION: Performed by: NURSE ANESTHETIST, CERTIFIED REGISTERED

## 2021-08-18 PROCEDURE — 88304 TISSUE EXAM BY PATHOLOGIST: CPT | Performed by: SURGERY

## 2021-08-18 PROCEDURE — 25010000002 HYDROMORPHONE PER 4 MG: Performed by: ANESTHESIOLOGY

## 2021-08-18 PROCEDURE — 25010000003 CEFAZOLIN PER 500 MG: Performed by: SURGERY

## 2021-08-18 PROCEDURE — 25010000002 ONDANSETRON PER 1 MG: Performed by: NURSE ANESTHETIST, CERTIFIED REGISTERED

## 2021-08-18 PROCEDURE — 88311 DECALCIFY TISSUE: CPT | Performed by: SURGERY

## 2021-08-18 PROCEDURE — 25010000003 LIDOCAINE 1 % SOLUTION: Performed by: SURGERY

## 2021-08-18 DEVICE — PTCH VASC XENOSURE BIO 0.8X8CM: Type: IMPLANTABLE DEVICE | Site: CAROTID | Status: FUNCTIONAL

## 2021-08-18 RX ORDER — LIDOCAINE HYDROCHLORIDE 20 MG/ML
INJECTION, SOLUTION EPIDURAL; INFILTRATION; INTRACAUDAL; PERINEURAL AS NEEDED
Status: DISCONTINUED | OUTPATIENT
Start: 2021-08-18 | End: 2021-08-18 | Stop reason: SURG

## 2021-08-18 RX ORDER — SODIUM CHLORIDE 0.9 % (FLUSH) 0.9 %
3 SYRINGE (ML) INJECTION EVERY 12 HOURS SCHEDULED
Status: DISCONTINUED | OUTPATIENT
Start: 2021-08-18 | End: 2021-08-18 | Stop reason: HOSPADM

## 2021-08-18 RX ORDER — FENTANYL CITRATE 50 UG/ML
25 INJECTION, SOLUTION INTRAMUSCULAR; INTRAVENOUS
Status: DISCONTINUED | OUTPATIENT
Start: 2021-08-18 | End: 2021-08-18 | Stop reason: HOSPADM

## 2021-08-18 RX ORDER — AMLODIPINE BESYLATE 10 MG/1
10 TABLET ORAL DAILY
Status: DISCONTINUED | OUTPATIENT
Start: 2021-08-18 | End: 2021-08-19 | Stop reason: HOSPADM

## 2021-08-18 RX ORDER — ISOSORBIDE MONONITRATE 60 MG/1
60 TABLET, EXTENDED RELEASE ORAL DAILY
Status: DISCONTINUED | OUTPATIENT
Start: 2021-08-18 | End: 2021-08-19 | Stop reason: HOSPADM

## 2021-08-18 RX ORDER — SODIUM CHLORIDE, SODIUM LACTATE, POTASSIUM CHLORIDE, CALCIUM CHLORIDE 600; 310; 30; 20 MG/100ML; MG/100ML; MG/100ML; MG/100ML
100 INJECTION, SOLUTION INTRAVENOUS CONTINUOUS
Status: DISCONTINUED | OUTPATIENT
Start: 2021-08-18 | End: 2021-08-18 | Stop reason: HOSPADM

## 2021-08-18 RX ORDER — HYDROMORPHONE HYDROCHLORIDE 1 MG/ML
0.2 INJECTION, SOLUTION INTRAMUSCULAR; INTRAVENOUS; SUBCUTANEOUS
Status: DISCONTINUED | OUTPATIENT
Start: 2021-08-18 | End: 2021-08-18 | Stop reason: HOSPADM

## 2021-08-18 RX ORDER — NICARDIPINE HYDROCHLORIDE 2.5 MG/ML
INJECTION INTRAVENOUS AS NEEDED
Status: DISCONTINUED | OUTPATIENT
Start: 2021-08-18 | End: 2021-08-18 | Stop reason: SURG

## 2021-08-18 RX ORDER — ONDANSETRON 2 MG/ML
4 INJECTION INTRAMUSCULAR; INTRAVENOUS AS NEEDED
Status: DISCONTINUED | OUTPATIENT
Start: 2021-08-18 | End: 2021-08-18 | Stop reason: HOSPADM

## 2021-08-18 RX ORDER — SODIUM CHLORIDE 0.9 % (FLUSH) 0.9 %
3 SYRINGE (ML) INJECTION EVERY 12 HOURS SCHEDULED
Status: DISCONTINUED | OUTPATIENT
Start: 2021-08-18 | End: 2021-08-19 | Stop reason: HOSPADM

## 2021-08-18 RX ORDER — LABETALOL HYDROCHLORIDE 5 MG/ML
5 INJECTION, SOLUTION INTRAVENOUS
Status: DISCONTINUED | OUTPATIENT
Start: 2021-08-18 | End: 2021-08-18 | Stop reason: HOSPADM

## 2021-08-18 RX ORDER — DIAZEPAM 5 MG/1
5 TABLET ORAL NIGHTLY
Status: DISCONTINUED | OUTPATIENT
Start: 2021-08-18 | End: 2021-08-19 | Stop reason: HOSPADM

## 2021-08-18 RX ORDER — ACETAMINOPHEN 325 MG/1
650 TABLET ORAL EVERY 4 HOURS PRN
Status: DISCONTINUED | OUTPATIENT
Start: 2021-08-18 | End: 2021-08-19 | Stop reason: HOSPADM

## 2021-08-18 RX ORDER — LABETALOL HYDROCHLORIDE 5 MG/ML
20 INJECTION, SOLUTION INTRAVENOUS
Status: DISCONTINUED | OUTPATIENT
Start: 2021-08-18 | End: 2021-08-19 | Stop reason: HOSPADM

## 2021-08-18 RX ORDER — TRIAMCINOLONE ACETONIDE 1 MG/G
CREAM TOPICAL EVERY 12 HOURS SCHEDULED
Status: DISCONTINUED | OUTPATIENT
Start: 2021-08-18 | End: 2021-08-19 | Stop reason: HOSPADM

## 2021-08-18 RX ORDER — CITALOPRAM 20 MG/1
20 TABLET ORAL DAILY
Status: DISCONTINUED | OUTPATIENT
Start: 2021-08-18 | End: 2021-08-19 | Stop reason: HOSPADM

## 2021-08-18 RX ORDER — SODIUM CHLORIDE 0.9 % (FLUSH) 0.9 %
3-10 SYRINGE (ML) INJECTION AS NEEDED
Status: DISCONTINUED | OUTPATIENT
Start: 2021-08-18 | End: 2021-08-18 | Stop reason: HOSPADM

## 2021-08-18 RX ORDER — LIDOCAINE HYDROCHLORIDE 10 MG/ML
INJECTION, SOLUTION INFILTRATION; PERINEURAL AS NEEDED
Status: DISCONTINUED | OUTPATIENT
Start: 2021-08-18 | End: 2021-08-18 | Stop reason: HOSPADM

## 2021-08-18 RX ORDER — SODIUM CHLORIDE 0.9 % (FLUSH) 0.9 %
3 SYRINGE (ML) INJECTION AS NEEDED
Status: DISCONTINUED | OUTPATIENT
Start: 2021-08-18 | End: 2021-08-18 | Stop reason: HOSPADM

## 2021-08-18 RX ORDER — CLOPIDOGREL BISULFATE 75 MG/1
75 TABLET ORAL EVERY OTHER DAY
Status: DISCONTINUED | OUTPATIENT
Start: 2021-08-19 | End: 2021-08-19 | Stop reason: HOSPADM

## 2021-08-18 RX ORDER — LIDOCAINE HYDROCHLORIDE 10 MG/ML
0.5 INJECTION, SOLUTION EPIDURAL; INFILTRATION; INTRACAUDAL; PERINEURAL ONCE AS NEEDED
Status: DISCONTINUED | OUTPATIENT
Start: 2021-08-18 | End: 2021-08-18 | Stop reason: HOSPADM

## 2021-08-18 RX ORDER — SODIUM CHLORIDE, SODIUM LACTATE, POTASSIUM CHLORIDE, CALCIUM CHLORIDE 600; 310; 30; 20 MG/100ML; MG/100ML; MG/100ML; MG/100ML
1000 INJECTION, SOLUTION INTRAVENOUS CONTINUOUS
Status: DISCONTINUED | OUTPATIENT
Start: 2021-08-18 | End: 2021-08-18 | Stop reason: HOSPADM

## 2021-08-18 RX ORDER — EPHEDRINE SULFATE 50 MG/ML
INJECTION, SOLUTION INTRAVENOUS AS NEEDED
Status: DISCONTINUED | OUTPATIENT
Start: 2021-08-18 | End: 2021-08-18 | Stop reason: SURG

## 2021-08-18 RX ORDER — SODIUM CHLORIDE 0.9 % (FLUSH) 0.9 %
10 SYRINGE (ML) INJECTION AS NEEDED
Status: DISCONTINUED | OUTPATIENT
Start: 2021-08-18 | End: 2021-08-18 | Stop reason: HOSPADM

## 2021-08-18 RX ORDER — FENTANYL CITRATE 50 UG/ML
INJECTION, SOLUTION INTRAMUSCULAR; INTRAVENOUS AS NEEDED
Status: DISCONTINUED | OUTPATIENT
Start: 2021-08-18 | End: 2021-08-18 | Stop reason: SURG

## 2021-08-18 RX ORDER — NITROGLYCERIN 0.4 MG/1
0.4 TABLET SUBLINGUAL
Status: DISCONTINUED | OUTPATIENT
Start: 2021-08-18 | End: 2021-08-19 | Stop reason: HOSPADM

## 2021-08-18 RX ORDER — OXYCODONE AND ACETAMINOPHEN 10; 325 MG/1; MG/1
1 TABLET ORAL ONCE AS NEEDED
Status: DISCONTINUED | OUTPATIENT
Start: 2021-08-18 | End: 2021-08-18 | Stop reason: HOSPADM

## 2021-08-18 RX ORDER — SODIUM CHLORIDE 0.9 % (FLUSH) 0.9 %
10 SYRINGE (ML) INJECTION AS NEEDED
Status: DISCONTINUED | OUTPATIENT
Start: 2021-08-18 | End: 2021-08-19 | Stop reason: HOSPADM

## 2021-08-18 RX ORDER — NALOXONE HCL 0.4 MG/ML
0.4 VIAL (ML) INJECTION
Status: DISCONTINUED | OUTPATIENT
Start: 2021-08-18 | End: 2021-08-19 | Stop reason: HOSPADM

## 2021-08-18 RX ORDER — ONDANSETRON 2 MG/ML
4 INJECTION INTRAMUSCULAR; INTRAVENOUS EVERY 6 HOURS PRN
Status: DISCONTINUED | OUTPATIENT
Start: 2021-08-18 | End: 2021-08-19 | Stop reason: HOSPADM

## 2021-08-18 RX ORDER — PROPOFOL 10 MG/ML
VIAL (ML) INTRAVENOUS AS NEEDED
Status: DISCONTINUED | OUTPATIENT
Start: 2021-08-18 | End: 2021-08-18 | Stop reason: SURG

## 2021-08-18 RX ORDER — ACETAMINOPHEN 500 MG
1000 TABLET ORAL ONCE
Status: COMPLETED | OUTPATIENT
Start: 2021-08-18 | End: 2021-08-18

## 2021-08-18 RX ORDER — PANTOPRAZOLE SODIUM 40 MG/1
40 TABLET, DELAYED RELEASE ORAL DAILY
Status: DISCONTINUED | OUTPATIENT
Start: 2021-08-18 | End: 2021-08-19 | Stop reason: HOSPADM

## 2021-08-18 RX ORDER — BUPIVACAINE HCL/0.9 % NACL/PF 0.1 %
2 PLASTIC BAG, INJECTION (ML) EPIDURAL EVERY 8 HOURS
Status: COMPLETED | OUTPATIENT
Start: 2021-08-18 | End: 2021-08-19

## 2021-08-18 RX ORDER — ATORVASTATIN CALCIUM 10 MG/1
10 TABLET, FILM COATED ORAL DAILY
Status: DISCONTINUED | OUTPATIENT
Start: 2021-08-18 | End: 2021-08-19 | Stop reason: HOSPADM

## 2021-08-18 RX ORDER — TAMSULOSIN HYDROCHLORIDE 0.4 MG/1
0.4 CAPSULE ORAL DAILY
Status: DISCONTINUED | OUTPATIENT
Start: 2021-08-18 | End: 2021-08-19 | Stop reason: HOSPADM

## 2021-08-18 RX ORDER — ROCURONIUM BROMIDE 10 MG/ML
INJECTION, SOLUTION INTRAVENOUS AS NEEDED
Status: DISCONTINUED | OUTPATIENT
Start: 2021-08-18 | End: 2021-08-18 | Stop reason: SURG

## 2021-08-18 RX ORDER — LIDOCAINE HYDROCHLORIDE 40 MG/ML
SOLUTION TOPICAL AS NEEDED
Status: DISCONTINUED | OUTPATIENT
Start: 2021-08-18 | End: 2021-08-18 | Stop reason: SURG

## 2021-08-18 RX ORDER — FLUMAZENIL 0.1 MG/ML
0.2 INJECTION INTRAVENOUS AS NEEDED
Status: DISCONTINUED | OUTPATIENT
Start: 2021-08-18 | End: 2021-08-18 | Stop reason: HOSPADM

## 2021-08-18 RX ORDER — HEPARIN SODIUM 1000 [USP'U]/ML
INJECTION, SOLUTION INTRAVENOUS; SUBCUTANEOUS AS NEEDED
Status: DISCONTINUED | OUTPATIENT
Start: 2021-08-18 | End: 2021-08-18 | Stop reason: SURG

## 2021-08-18 RX ORDER — DEXAMETHASONE SODIUM PHOSPHATE 4 MG/ML
INJECTION, SOLUTION INTRA-ARTICULAR; INTRALESIONAL; INTRAMUSCULAR; INTRAVENOUS; SOFT TISSUE AS NEEDED
Status: DISCONTINUED | OUTPATIENT
Start: 2021-08-18 | End: 2021-08-18 | Stop reason: SURG

## 2021-08-18 RX ORDER — HYDROCODONE BITARTRATE AND ACETAMINOPHEN 5; 325 MG/1; MG/1
1 TABLET ORAL EVERY 4 HOURS PRN
Status: DISCONTINUED | OUTPATIENT
Start: 2021-08-18 | End: 2021-08-19 | Stop reason: HOSPADM

## 2021-08-18 RX ORDER — ONDANSETRON 4 MG/1
4 TABLET, FILM COATED ORAL EVERY 6 HOURS PRN
Status: DISCONTINUED | OUTPATIENT
Start: 2021-08-18 | End: 2021-08-19 | Stop reason: HOSPADM

## 2021-08-18 RX ORDER — NEOSTIGMINE METHYLSULFATE 5 MG/5 ML
SYRINGE (ML) INTRAVENOUS AS NEEDED
Status: DISCONTINUED | OUTPATIENT
Start: 2021-08-18 | End: 2021-08-18 | Stop reason: SURG

## 2021-08-18 RX ORDER — BUPIVACAINE HYDROCHLORIDE 5 MG/ML
INJECTION, SOLUTION PERINEURAL AS NEEDED
Status: DISCONTINUED | OUTPATIENT
Start: 2021-08-18 | End: 2021-08-18 | Stop reason: HOSPADM

## 2021-08-18 RX ORDER — HYDRALAZINE HYDROCHLORIDE 50 MG/1
50 TABLET, FILM COATED ORAL EVERY 12 HOURS SCHEDULED
Status: DISCONTINUED | OUTPATIENT
Start: 2021-08-18 | End: 2021-08-19 | Stop reason: HOSPADM

## 2021-08-18 RX ORDER — SODIUM CHLORIDE 9 MG/ML
50 INJECTION, SOLUTION INTRAVENOUS CONTINUOUS
Status: DISCONTINUED | OUTPATIENT
Start: 2021-08-18 | End: 2021-08-19 | Stop reason: HOSPADM

## 2021-08-18 RX ORDER — ONDANSETRON 2 MG/ML
INJECTION INTRAMUSCULAR; INTRAVENOUS AS NEEDED
Status: DISCONTINUED | OUTPATIENT
Start: 2021-08-18 | End: 2021-08-18 | Stop reason: SURG

## 2021-08-18 RX ORDER — BUPIVACAINE HCL/0.9 % NACL/PF 0.1 %
2 PLASTIC BAG, INJECTION (ML) EPIDURAL ONCE
Status: COMPLETED | OUTPATIENT
Start: 2021-08-18 | End: 2021-08-18

## 2021-08-18 RX ORDER — NALOXONE HCL 0.4 MG/ML
0.04 VIAL (ML) INJECTION AS NEEDED
Status: DISCONTINUED | OUTPATIENT
Start: 2021-08-18 | End: 2021-08-18 | Stop reason: HOSPADM

## 2021-08-18 RX ORDER — PREDNISONE 1 MG/1
5 TABLET ORAL TAKE AS DIRECTED
Status: DISCONTINUED | OUTPATIENT
Start: 2021-08-18 | End: 2021-08-19 | Stop reason: HOSPADM

## 2021-08-18 RX ADMIN — CEFAZOLIN 2 G: 10 INJECTION, POWDER, FOR SOLUTION INTRAVENOUS at 18:21

## 2021-08-18 RX ADMIN — HEPARIN SODIUM 7000 UNITS: 1000 INJECTION INTRAVENOUS; SUBCUTANEOUS at 10:08

## 2021-08-18 RX ADMIN — HYDROCODONE BITARTRATE AND ACETAMINOPHEN 1 TABLET: 5; 325 TABLET ORAL at 16:40

## 2021-08-18 RX ADMIN — GLYCOPYRROLATE 0.4 MG: 0.2 INJECTION, SOLUTION INTRAMUSCULAR; INTRAVENOUS at 11:00

## 2021-08-18 RX ADMIN — ATORVASTATIN CALCIUM 10 MG: 10 TABLET, FILM COATED ORAL at 16:40

## 2021-08-18 RX ADMIN — LIDOCAINE HYDROCHLORIDE 1 EACH: 40 SOLUTION TOPICAL at 09:37

## 2021-08-18 RX ADMIN — PROPOFOL 140 MG: 10 INJECTION, EMULSION INTRAVENOUS at 09:37

## 2021-08-18 RX ADMIN — VASOPRESSIN 0.5 ML: 20 INJECTION INTRAVENOUS at 10:23

## 2021-08-18 RX ADMIN — AMLODIPINE BESYLATE 10 MG: 10 TABLET ORAL at 16:40

## 2021-08-18 RX ADMIN — Medication 2 G: at 09:42

## 2021-08-18 RX ADMIN — HYDRALAZINE HYDROCHLORIDE 50 MG: 50 TABLET ORAL at 16:40

## 2021-08-18 RX ADMIN — LIDOCAINE HYDROCHLORIDE 80 MG: 20 INJECTION, SOLUTION EPIDURAL; INFILTRATION; INTRACAUDAL; PERINEURAL at 09:37

## 2021-08-18 RX ADMIN — FENTANYL CITRATE 100 MCG: 50 INJECTION, SOLUTION INTRAMUSCULAR; INTRAVENOUS at 09:32

## 2021-08-18 RX ADMIN — ONDANSETRON 4 MG: 2 INJECTION INTRAMUSCULAR; INTRAVENOUS at 12:09

## 2021-08-18 RX ADMIN — ISOSORBIDE MONONITRATE 60 MG: 60 TABLET, EXTENDED RELEASE ORAL at 16:40

## 2021-08-18 RX ADMIN — Medication 3 MG: at 11:00

## 2021-08-18 RX ADMIN — HYDROMORPHONE HYDROCHLORIDE 0.2 MG: 1 INJECTION, SOLUTION INTRAMUSCULAR; INTRAVENOUS; SUBCUTANEOUS at 11:47

## 2021-08-18 RX ADMIN — ACETAMINOPHEN 1000 MG: 500 TABLET, FILM COATED ORAL at 08:51

## 2021-08-18 RX ADMIN — VASOPRESSIN 0.5 ML: 20 INJECTION INTRAVENOUS at 10:36

## 2021-08-18 RX ADMIN — ROCURONIUM BROMIDE 40 MG: 10 INJECTION INTRAVENOUS at 09:37

## 2021-08-18 RX ADMIN — PHENYLEPHRINE HYDROCHLORIDE 0.5 MCG/KG/MIN: 10 INJECTION INTRAVENOUS at 09:44

## 2021-08-18 RX ADMIN — ONDANSETRON 4 MG: 2 INJECTION INTRAMUSCULAR; INTRAVENOUS at 10:49

## 2021-08-18 RX ADMIN — DIAZEPAM 5 MG: 5 TABLET ORAL at 21:03

## 2021-08-18 RX ADMIN — HYDRALAZINE HYDROCHLORIDE 50 MG: 50 TABLET ORAL at 21:03

## 2021-08-18 RX ADMIN — SODIUM CHLORIDE 250 MCG: 9 INJECTION, SOLUTION INTRAVENOUS at 10:50

## 2021-08-18 RX ADMIN — PHENOL 2 SPRAY: 1.5 LIQUID ORAL at 15:58

## 2021-08-18 RX ADMIN — VASOPRESSIN 0.5 ML: 20 INJECTION INTRAVENOUS at 10:11

## 2021-08-18 RX ADMIN — PANTOPRAZOLE SODIUM 40 MG: 40 TABLET, DELAYED RELEASE ORAL at 16:40

## 2021-08-18 RX ADMIN — DEXAMETHASONE SODIUM PHOSPHATE 4 MG: 4 INJECTION, SOLUTION INTRA-ARTICULAR; INTRALESIONAL; INTRAMUSCULAR; INTRAVENOUS; SOFT TISSUE at 09:54

## 2021-08-18 RX ADMIN — EPHEDRINE SULFATE 10 MG: 50 INJECTION INTRAVENOUS at 10:05

## 2021-08-18 RX ADMIN — SODIUM CHLORIDE 50 ML/HR: 9 INJECTION, SOLUTION INTRAVENOUS at 12:48

## 2021-08-18 RX ADMIN — VASOPRESSIN 0.5 ML: 20 INJECTION INTRAVENOUS at 10:31

## 2021-08-18 RX ADMIN — SODIUM CHLORIDE, POTASSIUM CHLORIDE, SODIUM LACTATE AND CALCIUM CHLORIDE: 600; 310; 30; 20 INJECTION, SOLUTION INTRAVENOUS at 09:30

## 2021-08-18 RX ADMIN — TAMSULOSIN HYDROCHLORIDE 0.4 MG: 0.4 CAPSULE ORAL at 16:40

## 2021-08-18 RX ADMIN — EPHEDRINE SULFATE 10 MG: 50 INJECTION INTRAVENOUS at 10:44

## 2021-08-18 RX ADMIN — CITALOPRAM 20 MG: 20 TABLET, FILM COATED ORAL at 16:40

## 2021-08-18 RX ADMIN — HEPARIN SODIUM 1000 UNITS: 1000 INJECTION INTRAVENOUS; SUBCUTANEOUS at 10:23

## 2021-08-18 RX ADMIN — SODIUM CHLORIDE 500 MCG: 9 INJECTION, SOLUTION INTRAVENOUS at 10:46

## 2021-08-18 RX ADMIN — EPHEDRINE SULFATE 10 MG: 50 INJECTION INTRAVENOUS at 10:00

## 2021-08-18 NOTE — ANESTHESIA PROCEDURE NOTES
Airway  Urgency: elective    Date/Time: 8/18/2021 9:39 AM  Airway not difficult    General Information and Staff    Patient location during procedure: OR  CRNA: Jayson Flynn CRNA    Indications and Patient Condition  Indications for airway management: airway protection    Preoxygenated: yes  Mask difficulty assessment: 1 - vent by mask    Final Airway Details  Final airway type: endotracheal airway      Successful airway: ETT  Cuffed: yes   Successful intubation technique: direct laryngoscopy  Facilitating devices/methods: intubating stylet  Endotracheal tube insertion site: oral  Blade: Watts  Blade size: 2  ETT size (mm): 7.0  Cormack-Lehane Classification: grade IIa - partial view of glottis  Placement verified by: chest auscultation and capnometry   Cuff volume (mL): 8  Measured from: lips  ETT/EBT  to lips (cm): 23  Number of attempts at approach: 1  Assessment: lips, teeth, and gum same as pre-op and atraumatic intubation

## 2021-08-18 NOTE — ANESTHESIA POSTPROCEDURE EVALUATION
Patient: Marika Gutierrez    Procedure Summary     Date: 08/18/21 Room / Location: [facility] OR  / [facility] HYBRID OR 12    Anesthesia Start: 0930 Anesthesia Stop: 1123    Procedure: LEFT CAROTID ENDARTERECTOMY WITH EEG (Left Neck) Diagnosis:       Bilateral carotid artery stenosis      Preop testing      (Bilateral carotid artery stenosis [I65.23])      (Preop testing [Z01.818])    Surgeons: Puneet Chang DO Provider: Jayson Flynn CRNA    Anesthesia Type: general ASA Status: 3          Anesthesia Type: general    Vitals  Vitals Value Taken Time   /54 08/18/21 1215   Temp 97.8 °F (36.6 °C) 08/18/21 1215   Pulse 83 08/18/21 1225   Resp 15 08/18/21 1215   SpO2 92 % 08/18/21 1225           Post Anesthesia Care and Evaluation    Patient location during evaluation: bedside  Patient participation: complete - patient participated  Level of consciousness: awake and awake and alert  Pain score: 0  Pain management: adequate  Airway patency: patent  Anesthetic complications: No anesthetic complications  PONV Status: none  Cardiovascular status: acceptable  Respiratory status: acceptable  Hydration status: acceptable    Comments: Patient discharged according to acceptable Helen score per RN assessment. See nursing records for further information.     Blood pressure 157/78, pulse 89, temperature 97.6 °F (36.4 °C), temperature source Axillary, resp. rate 18, SpO2 96 %.

## 2021-08-18 NOTE — OP NOTE
Marika Gutierrez  8/18/2021     PREOPERATIVE DIAGNOSIS: Bilateral carotid artery stenosis [I65.23]  Preop testing [Z01.818]     POSTOPERATIVE DIAGNOSIS: Post-Op Diagnosis Codes:     * Bilateral carotid artery stenosis [I65.23]     * Preop testing [Z01.818]     PROCEDURE PERFORMED:   1.  Left carotid endarterectomy with bovine patch angioplasty  2.  Continuous electroencephalographic monitoring  3.  Completion arterial duplex     SURGEON: Puneet Chang DO   Assistant: Jameel Barboza MD     ANESTHESIA: General.    PREPARATION: Routine.    STAFF: Circulator: Erin Cintron RN  Scrub Person: Antonino Wren Marla J  Vendor Representative: Cynthia Tam MD    Estimated Blood Loss: 200ml    SPECIMENS: Carotid plaque    COMPLICATIONS: None    INDICATIONS: Marika Gutierrez is a 75 y.o. male who is currently following for coronary artery disease.  He denies any strokelike symptoms. He is maintained on Plavix and Lipitor.  He had noninvasive testing performed which I personally reviewed here today. The indications, risks, and possible complications of the procedure were explained to the patient, who voiced understanding and wished to proceed with surgery.     PROCEDURE IN DETAIL:   The patient was taken to the operating room and placed on the operating table in a supine position. After general anesthesia was obtained, the left neck and chest was prepped and draped in a sterile manner.  A longitudinal incision was then made along the anterior border of the sternocleidomastoid muscle.  Careful dissection was made down through the subcutaneous tissues using the Bovie cautery to ensure hemostasis.  Any crossing veins were ligated with 3-0 silk suture and hemoclips.  The sternocleidomastoid muscle was retracted laterally.  The carotid sheath was entered over the common carotid artery.  The Metzenbaum scissors were used to free up the common carotid artery from its local attachments.  A large vessel loop  was placed for proximal vascular control.  Dissection was then carried out distally along the carotid artery encountering the facial vein which was ligated with 2-0 silk suture.  This gave rise to the bifurcation.  The superior thyroid artery was identified and encircled with a 2-0 silk suture for vascular control.  The external carotid artery was dissected free and a large vessel loop was placed for vascular control.  The very distal internal carotid artery was carefully dissected free and a small vessel loop was placed for vascular control.  At this point full exposure was established.  The ansa cervicalis, hypoglossal, vagus nerves were all identified.  The patient was given 8000 units of intravenous heparin.  After 3 minutes the distal internal carotid artery was test clamped.  After 2 minutes there were no EEG monitor changes.  The common and external were then clamped in succession.  Using an 11 blade an arteriotomy was made in the common carotid artery.  It was extended up along the distal internal carotid artery with the Loving scissors.  There was a significant amount of heavy plaque burden in the proximal internal carotid artery.  Using the [place] elevator the standard endarterectomy was performed removing all the plaque burden.  Heparinized saline was used to irrigate the wound bed and all loose debris was picked clean.  The bovine pericardial patch was brought to the field and starting distally on the internal carotid artery the patch anastomosis was performed with a 6-0 Prolene in a running fashion.  Both sutures were brought down to the midline.  The patch was then appropriately fashioned proximally.  Starting with 5-0 Prolene the patch anastomosis was continued in a running fashion to meet in the midline.  Prior to completion of the patch anastomosis the appropriate flushing maneuvers were performed and anastomosis was completed.  Flow was reestablished.  Hemostasis was observed.  A completion arterial  duplex was performed which showed adequate flow velocities in the common, internal, and external carotid arteries.  There was no evidence of flap formation, debris, or thrombosis.  At this point I felt the result was excellent and no further intervention was warranted.  A separate stab incision was made on the inferior part of the neck and a 15 Slovenian round GIOVANNI drain was placed.  It was secured to skin with a 2-0 nylon.  Gelfoam with thrombin was used to ensure hemostasis.  Antibiotic saline was used to irrigate the wound bed.  The platysma muscle was then reapproximated using a 3-0 Vicryl in a running fashion.  The skin was then anesthetized using 18 mL of 0.5% Marcaine plain.  The skin was then reapproximated using a 4-0 Monocryl in a subcuticular fashion.  The wound was then cleaned.   Sterile dressings were applied. The patient tolerated the procedure well. Sponge and needle counts were correct. The patient was then awakened and extubated in the operating room and taken to the recovery room in good condition. The patient awoke from anesthesia neurologically intact and there were no EEG monitor changes throughout.  Dr. Barboza was present and assisted in the vital part of the procedure which included carotid exposure, carotid endarterectomy with bovine patch angioplasty, and primary closure.    Puneet Chang, DO  Date: 8/18/2021 Time: 11:14 CDT    CC:MD Juan Maldonado Jonathan Patmor, MD

## 2021-08-18 NOTE — PLAN OF CARE
Goal Outcome Evaluation:  Plan of Care Reviewed With: patient        Progress: no change  Outcome Summary: ivf/ iv abx cont.  o2 per nc at 3l. no neuro deficits. mirna drain with serosang drainage. due to void. c/o sore throat, received order for chloraseptic spray prn. no acute distress noted. cont to monitor.

## 2021-08-18 NOTE — ANESTHESIA PROCEDURE NOTES
Arterial Line    Pre-sedation assessment completed: 8/18/2021 8:46 AM    Patient reassessed immediately prior to procedure    Patient location during procedure: pre-op  Start time: 8/18/2021 8:47 AM  Stop Time:8/18/2021 8:49 AM       Line placed for hemodynamic monitoring.  Performed By   Anesthesiologist: Keesha Valencia MD  Preanesthetic Checklist  Completed: patient identified, IV checked, site marked, risks and benefits discussed, surgical consent, monitors and equipment checked, pre-op evaluation and timeout performed  Arterial Line Prep   Sterile Tech: gloves and mask  Prep: ChloraPrep  Patient monitoring: blood pressure monitoring, continuous pulse oximetry and EKG  Arterial Line Procedure   Laterality:right  Location:  radial artery  Catheter size: 20 G   Guidance: ultrasound guided  PROCEDURE NOTE/ULTRASOUND INTERPRETATION.  Using ultrasound guidance the potential vascular sites for insertion of the catheter were visualized to determine the patency of the vessel to be used for vascular access.  After selecting the appropriate site for insertion, the needle was visualized under ultrasound being inserted into the radial artery, followed by ultrasound confirmation of wire and catheter placement. There were no abnormalities seen on ultrasound; an image was taken; and the patient tolerated the procedure with no complications.   Number of attempts: 2  Successful placement: yes  Post Assessment   Dressing Type: secured with tape and wrist guard applied.   Complications no  Circ/Move/Sens Assessment: normal and unchanged.   Patient Tolerance: patient tolerated the procedure well with no apparent complications

## 2021-08-18 NOTE — ANESTHESIA PREPROCEDURE EVALUATION
Anesthesia Evaluation     Patient summary reviewed and Nursing notes reviewed   history of anesthetic complications: PONV  NPO Solid Status: > 8 hours  NPO Liquid Status: > 8 hours           Airway   Mallampati: I  TM distance: >3 FB  Neck ROM: full  No difficulty expected  Dental      Pulmonary    (+) COPD,   (-) not a smoker  Cardiovascular   Exercise tolerance: poor (<4 METS)    (+) hypertension, CAD, CABG, hyperlipidemia,  carotid artery disease    ROS comment: 7/23/21  · Left ventricular ejection fraction is normal. (Calculated EF = 63%).  · Myocardial perfusion imaging indicates a medium-sized infarct located in the lateral wall with no significant ischemia noted.    CT angio neck  IMPRESSION:  1. Images interpreted with NASCET criteria.  2. 70% stenosis of the proximal left internal carotid artery.  3. 50% stenosis of the proximal right internal carotid artery.  4. Dominant right vertebral artery with prominent tapering of the left  distal vertebral artery. Small caliber patent basilar artery.  5. Fetal origin right posterior cerebral artery from the anterior  circulation.  6. Coronary artery bypass surgical changes.  7. Emphysema.    Neuro/Psych  (+) TIA,     (-) seizures  GI/Hepatic/Renal/Endo    (+) obesity,  GERD,  renal disease (renal cell carcinoma s/p nephrectomy) CRI,   (-) liver disease    Musculoskeletal     Abdominal    Substance History      OB/GYN          Other      history of cancer remission                    Anesthesia Plan    ASA 3     general     intravenous induction     Anesthetic plan, all risks, benefits, and alternatives have been provided, discussed and informed consent has been obtained with: patient.

## 2021-08-19 VITALS
OXYGEN SATURATION: 94 % | BODY MASS INDEX: 29.82 KG/M2 | TEMPERATURE: 98 F | SYSTOLIC BLOOD PRESSURE: 145 MMHG | HEIGHT: 69 IN | DIASTOLIC BLOOD PRESSURE: 74 MMHG | HEART RATE: 67 BPM | RESPIRATION RATE: 16 BRPM

## 2021-08-19 PROCEDURE — 25010000002 CEFAZOLIN PER 500 MG: Performed by: SURGERY

## 2021-08-19 PROCEDURE — 99024 POSTOP FOLLOW-UP VISIT: CPT | Performed by: SURGERY

## 2021-08-19 RX ADMIN — HYDRALAZINE HYDROCHLORIDE 50 MG: 50 TABLET ORAL at 10:48

## 2021-08-19 RX ADMIN — TAMSULOSIN HYDROCHLORIDE 0.4 MG: 0.4 CAPSULE ORAL at 10:46

## 2021-08-19 RX ADMIN — CITALOPRAM 20 MG: 20 TABLET, FILM COATED ORAL at 10:47

## 2021-08-19 RX ADMIN — ATORVASTATIN CALCIUM 10 MG: 10 TABLET, FILM COATED ORAL at 10:47

## 2021-08-19 RX ADMIN — ISOSORBIDE MONONITRATE 60 MG: 60 TABLET, EXTENDED RELEASE ORAL at 10:47

## 2021-08-19 RX ADMIN — AMLODIPINE BESYLATE 10 MG: 10 TABLET ORAL at 10:48

## 2021-08-19 RX ADMIN — CEFAZOLIN 2 G: 10 INJECTION, POWDER, FOR SOLUTION INTRAVENOUS at 02:56

## 2021-08-19 RX ADMIN — CLOPIDOGREL 75 MG: 75 TABLET, FILM COATED ORAL at 10:47

## 2021-08-19 RX ADMIN — PANTOPRAZOLE SODIUM 40 MG: 40 TABLET, DELAYED RELEASE ORAL at 10:47

## 2021-08-19 NOTE — PLAN OF CARE
Goal Outcome Evaluation:  Plan of Care Reviewed With: patient  Progress: no change      Pt with minimal c/o pain this shift, just tenderness at incision site. Pain meds offered, but refused. IVF and IV abx infused per orders. Drsg to L neck remains intact, scant area of drainage marked per prev shift with no change noted. GIOVANNI x1 maintained with output. Voiding per urinal. Sats maintained in mid 90's on room air. VSS. Safety maintained.

## 2021-08-19 NOTE — PAYOR COMM NOTE
"Kalyan Gutierrez (75 y.o. Male) [id number]    D/C  Notification      [facility]   keven phone    Fax        Date of Birth Social Security Number Address Home Phone MRN    1945  [address]  [place] [address] [phone] [id number]    Hindu Marital Status          Non-Evangelical Significant Other       Admission Date Admission Type Admitting Provider Attending Provider Department, Room/Bed    8/18/21 Elective Puneet Chang DO  [facility] 3C, 393/1    Discharge Date Discharge Disposition Discharge Destination        8/19/2021 Home or Self Care              Attending Provider: (none)   Allergies: Cyclobenzaprine, Hydrocodone-acetaminophen, Pentazocine    Isolation: None   Infection: None   Code Status: Not on file    Ht: 175.3 cm (69\")   Wt: 91.6 kg (201 lb 15.1 oz)    Admission Cmt: None   Principal Problem: Bilateral carotid artery stenosis [I65.23] More...                 Active Insurance as of 8/18/2021     Primary Coverage     Payor Plan Insurance Group Employer/Plan Group    AETNA MEDICARE REPLACEMENT AETNA MEDICARE REPLACEMENT [id number]     Payor Plan Address Payor Plan Phone Number Payor Plan Fax Number Effective Dates    [address] [phone]  4/1/2020 - None Entered    [facility] [id number]       Subscriber Name Subscriber Birth Date Member ID       KALYAN GUTIERREZ 1945 [id number]                 Emergency Contacts      (Rel.) Home Phone Work Phone Mobile Phone    ELEAZAR CADENA (Significant Other) [phone] -- --               Discharge Summary      Puneet Chang DO at 08/19/21 0759            Date of Discharge:  8/19/2021    Discharge Diagnosis: Left carotid stenosis    Presenting Problem/History of Present Illness  Bilateral carotid artery stenosis [I65.23]  Preop testing [Z01.818]       Hospital Course  Kalyan Gutierrez is a 75 y.o.  male who you are currently following for coronary " artery disease.  He denies any strokelike symptoms. He is maintained on Plavix and Lipitor.  He had noninvasive testing performed which I personally reviewed here today.  He presented yesterday for elective left carotid endarterectomy for stroke risk reduction.  His surgery went well and he was transferred to  for continued monitoring and care.  Throughout his stay he is done excellent and remains neurologically intact.  His GIOVANNI drain was removed at the bedside on postop day 1.  He is stable for discharge home.  He will remain on Plavix/Lipitor.  He does not need pain medication.  We will see him back in 2 weeks time for continued follow-up and care.    Procedures Performed  Procedure(s):  LEFT CAROTID ENDARTERECTOMY WITH EEG       Consults:   Consults     No orders found for last 30 day(s).            Condition on Discharge: Good    Discharge Medications     Discharge Medications      Continue These Medications      Instructions Start Date   amLODIPine 10 MG tablet  Commonly known as: NORVASC   10 mg, Oral, Daily      atorvastatin 10 MG tablet  Commonly known as: LIPITOR   10 mg, Oral, Daily      citalopram 20 MG tablet  Commonly known as: CeleXA   20 mg, Oral, Daily      clopidogrel 75 MG tablet  Commonly known as: PLAVIX   75 mg, Oral, Every Other Day      diazePAM 5 MG tablet  Commonly known as: VALIUM   5 mg, Oral, Nightly      hydrALAZINE 50 MG tablet  Commonly known as: APRESOLINE   50 mg, Oral, 2 times daily      isosorbide mononitrate 60 MG 24 hr tablet  Commonly known as: IMDUR   60 mg, Oral, Daily      nitroglycerin 0.4 MG SL tablet  Commonly known as: NITROSTAT   0.4 mg, Sublingual, Every 5 Minutes PRN      pantoprazole 40 MG EC tablet  Commonly known as: PROTONIX   40 mg, Oral, Daily      predniSONE 5 MG tablet  Commonly known as: DELTASONE   5 mg, Oral, Take As Directed, Every other week      tamsulosin 0.4 MG capsule 24 hr capsule  Commonly known as: FLOMAX   1 capsule, Oral, Daily      triamcinolone  0.1 % cream  Commonly known as: KENALOG   triamcinolone acetonide 0.1 % topical cream             Discharge Diet:   Diet Instructions     Advance Diet As Tolerated            Activity at Discharge:   Activity Instructions     Bathing Restrictions      Type of Restriction: Bathing    Bathing Restrictions: Other    Explain Bathing Restrictions: ok to shower starting tomorrow    Driving Restrictions      Type of Restriction: Driving    Driving Restrictions: No Driving (Time Limited)    Length: 2 Weeks    Gradually Increase Activity Until at Pre-Hospitalization Level      Lifting Restrictions      Type of Restriction: Lifting    Lifting Restrictions: Lifting Restriction (Indicate Limit)    Weight Limit (Pounds): 10    Length of Lifting Restriction: 1 week    Work Restrictions      Type of Restriction: Work    May Return to Work: In 1 Week    With / Without Restrictions: Without Restrictions          Follow-up Appointments  Future Appointments   Date Time Provider Department Center   11/30/2021 11:00 AM PAD ECHO ROOM 2 BH PAD CARDI PAD   12/21/2021  8:30 AM PAD BIC US 2 BH PAD US BI PAD   12/21/2021 10:40 AM Kodi Burrell MD [facility] U PAD PAD   1/26/2022 10:00 AM Pradip Hoskins MD [facility] CD  PAD     Additional Instructions for the Follow-ups that You Need to Schedule     Discharge Follow-up with Specified Provider: Bernardo; 2 Weeks   As directed      To: Bernardo    Follow Up: 2 Weeks                Approximate 35 minutes of time was spent arranging discharge, face-to-face interaction, and chart review.    uPneet Chang DO  08/19/21  07:59 CDT    Electronically signed by Puneet Chang DO at 08/19/21 0800

## 2021-08-19 NOTE — DISCHARGE SUMMARY
Patient: Hilario Montgomery Date: 2017   : 1976    40 year old male      OUTPATIENT WOUND CARE PROGRESS NOTE    Supervising Wound Care / Hyperbaric Medicine Physician: Not Applicable  Consulting Provider:  Lam Norris MD  Date of Consultation/Last Comprehensive Exam:  16  Referring  Provider:  Lyndon Traylor MD    SUBJECTIVE:    Chief Complaint:  LLE wounds    Wound/Ulcer Present:    Non-healing surgical wound    Additional Wound Category:  None     Maximum Baseline Ambulatory Status:  Ambulates unassisted    History of Present Illness:  This is a 40 year old non-diabetic man who underwent left knee medial compartment replacement for osteoarthritis on 10/24/16 with Dr. Traylor.  He had a tibial wound dehiscence and underwent left tibial wound debridement, tibia deep debridement and irrigation, and wound VAC application on 16 with Dr. Traylor.  The VAC was stopped after 6 days; since then the patient and his wife have been applying Iodosorb bid. Patient completed antibiotic therapy.     Interval Hx: Patient presented for a follow up appointment with his family. He denies any fevers, chills, nausea or vomiting. Patient was seen by Dr. Traylor last week. Per patient follow up xray normal. He returned back to work     Current Treatment Regimen:  Dressing:  Acticoat Flex  Double Tubiugrips  Frequency:  Three times a week   Changed by:  Patient and Family    Review of Systems:  Pertinent items are noted in HPI (history of present illness).    Past Medical History   Diagnosis Date   • Anesthesia      after ulnar nerve surgery developed fungal/bacterial infection in mouth - pt was told is was related to intubation    • Arthritis      hands -slight   • GERD (gastroesophageal reflux disease)    • History of concussion      mild- fall on ice    • Lip lesion      lower    • Temporal lobe lesion      right      Past Surgical History   Procedure Laterality Date   • Knee arthroscopy w/ debridement Left    Date of Discharge:  8/19/2021    Discharge Diagnosis: Left carotid stenosis    Presenting Problem/History of Present Illness  Bilateral carotid artery stenosis [I65.23]  Preop testing [Z01.818]       Hospital Course  Marika Gutierrez is a 75 y.o.  male who you are currently following for coronary artery disease.  He denies any strokelike symptoms. He is maintained on Plavix and Lipitor.  He had noninvasive testing performed which I personally reviewed here today.  He presented yesterday for elective left carotid endarterectomy for stroke risk reduction.  His surgery went well and he was transferred to  for continued monitoring and care.  Throughout his stay he is done excellent and remains neurologically intact.  His GIOVANNI drain was removed at the bedside on postop day 1.  He is stable for discharge home.  He will remain on Plavix/Lipitor.  He does not need pain medication.  We will see him back in 2 weeks time for continued follow-up and care.    Procedures Performed  Procedure(s):  LEFT CAROTID ENDARTERECTOMY WITH EEG       Consults:   Consults     No orders found for last 30 day(s).            Condition on Discharge: Good    Discharge Medications     Discharge Medications      Continue These Medications      Instructions Start Date   amLODIPine 10 MG tablet  Commonly known as: NORVASC   10 mg, Oral, Daily      atorvastatin 10 MG tablet  Commonly known as: LIPITOR   10 mg, Oral, Daily      citalopram 20 MG tablet  Commonly known as: CeleXA   20 mg, Oral, Daily      clopidogrel 75 MG tablet  Commonly known as: PLAVIX   75 mg, Oral, Every Other Day      diazePAM 5 MG tablet  Commonly known as: VALIUM   5 mg, Oral, Nightly      hydrALAZINE 50 MG tablet  Commonly known as: APRESOLINE   50 mg, Oral, 2 times daily      isosorbide mononitrate 60 MG 24 hr tablet  Commonly known as: IMDUR   60 mg, Oral, Daily      nitroglycerin 0.4 MG SL tablet  Commonly known as: NITROSTAT   0.4 mg, Sublingual, Every 5 Minutes PRN       pantoprazole 40 MG EC tablet  Commonly known as: PROTONIX   40 mg, Oral, Daily      predniSONE 5 MG tablet  Commonly known as: DELTASONE   5 mg, Oral, Take As Directed, Every other week      tamsulosin 0.4 MG capsule 24 hr capsule  Commonly known as: FLOMAX   1 capsule, Oral, Daily      triamcinolone 0.1 % cream  Commonly known as: KENALOG   triamcinolone acetonide 0.1 % topical cream             Discharge Diet:   Diet Instructions     Advance Diet As Tolerated            Activity at Discharge:   Activity Instructions     Bathing Restrictions      Type of Restriction: Bathing    Bathing Restrictions: Other    Explain Bathing Restrictions: ok to shower starting tomorrow    Driving Restrictions      Type of Restriction: Driving    Driving Restrictions: No Driving (Time Limited)    Length: 2 Weeks    Gradually Increase Activity Until at Pre-Hospitalization Level      Lifting Restrictions      Type of Restriction: Lifting    Lifting Restrictions: Lifting Restriction (Indicate Limit)    Weight Limit (Pounds): 10    Length of Lifting Restriction: 1 week    Work Restrictions      Type of Restriction: Work    May Return to Work: In 1 Week    With / Without Restrictions: Without Restrictions          Follow-up Appointments  Future Appointments   Date Time Provider Department Center   11/30/2021 11:00 AM PAD ECHO ROOM 2 BH PAD CARDI PAD   12/21/2021  8:30 AM PAD BIC US 2 BH PAD US BI PAD   12/21/2021 10:40 AM Kodi Burrell MD [facility] U PAD PAD   1/26/2022 10:00 AM Pradip Hoskins MD [facility] CD  PAD     Additional Instructions for the Follow-ups that You Need to Schedule     Discharge Follow-up with Specified Provider: Bernardo; 2 Weeks   As directed      To: Bernardo    Follow Up: 2 Weeks                Approximate 35 minutes of time was spent arranging discharge, face-to-face interaction, and chart review.    Puneet Chang DO  08/19/21  07:59 CDT   1/27/2015     Dr Mary Clay    • Hernia repair       bilateral   • Ulnar nerve transposition Left    • Skin biopsy Right      temporal area, basal cell   • Knee arthroscopy w/ meniscal repair Left    • Joint replacement Left 10/2016     partial knee replacement     Social History     Social History   • Marital status:      Spouse name: N/A   • Number of children: N/A   • Years of education: N/A     Occupational History   • Not on file.     Social History Main Topics   • Smoking status: Former Smoker     Packs/day: 0.75     Years: 21.00     Types: Cigarettes   • Smokeless tobacco: Never Used   • Alcohol use 8.4 oz/week     14 Cans of beer per week      Comment: few drinks every day    • Drug use: No   • Sexual activity: Not on file     Other Topics Concern   • Not on file     Social History Narrative     Family History   Problem Relation Age of Onset   • Diabetes Mother    • Heart Mother      atrial fib    • Vascular Mother      carotid artery disease    • Stroke Mother      mild    • High cholesterol Father    • Heart Father      atrial fib   • Cancer Father      ear in younger days and prostate in  adult      • Thyroid Sister    • NEGATIVE FAMILY HX OF Brother    • Migraine Daughter    • Asthma Son    • Allergies Son    • Diabetes Brother    • Early death Brother      \"self induced cause of death \"   • Cancer Brother 50     stage 4 lung with mets to bone- passed 3 months later   • Diabetes Brother    • Asthma Son      mild    • NEGATIVE FAMILY HX OF Son        Current Outpatient Prescriptions   Medication Sig   • oxyCODONE, IMM REL, (ROXICODONE) 5 MG immediate release tablet 1-3 tabs po q4-6 hours prn pain   • oxyCODONE, IMM REL, (ROXICODONE) 5 MG immediate release tablet Take 1-3 tablets by mouth every 4 hours as needed for Pain.   • oxyCODONE SR (OXYCONTIN) 10 MG 12 hr tablet Take 1 tablet by mouth every 12 hours as needed for Pain.   • aspirin 325 MG tablet Take 1 tablet by mouth every 12 hours.   •  escitalopram (LEXAPRO) 20 MG tablet Take 1 tablet by mouth daily.   • omeprazole (PRILOSEC OTC) 20 MG tablet Take 20 mg by mouth daily.     Current Facility-Administered Medications   Medication   • lidocaine (XYLOCAINE) 4 % topical solution        ALLERGIES: no known allergies.    OBJECTIVE:  Vital Signs:    Visit Vitals   • /80 (BP Location: [facility], Patient Position: Sitting)   • Pulse 75   • Temp 96.8 °F (36 °C) (Temporal Artery)   • Ht 5' 10\" (1.778 m)   • Wt 105.1 kg   • BMI 33.25 kg/m2         Physical Exam:  General appearance: Appears stated age, Alert, well-developed, well-nourished, oriented to person, place, time and situation, in no distress and cooperative   HEENT: Head is normocephalic, atraumatic     LLE with non pitting edema. 2+ strong palpable pedal pulse. Left lower extremity wound is granular after debridement. Yessica wound intact.         Wound Bed Quality:  Granulation tissue      Yessica-wound Quality:    None    Additional Descriptors:  none    Wound Measurements Per Flowsheet:  Wound Leg Left Lower Surgical incision-Pressure Ulcer Stage: Not a pressure ulcer  Wound Leg Left Lower Surgical incision-Wound Length (cm): 0.9 cm  Wound Leg Left Lower Surgical incision-Wound Width (cm): 1.2 cm  Wound Leg Left Lower Surgical incision-Depth (cm): 0.3 cm     Wound Leg Left Lower Surgical incision-Wound Bed % Granulated: 100 % (Post Debridement)                                                 PROCEDURE:  Debridement: Selective Non-Excisional Debridement of Non-viable Tissue.  Informed consent obtained.  Time out was completed.  Patient, procedure, and site verified.  Anesthesia-  Topical  Size-  Less than or equal to 20 sq cm  Instrument-  Curette  Non-viable tissue removed-  Fibrin/Slough and Epidermis/dermis  Hemostasis achieved-  Pressure  Patient procedure was-  tolerated well, no complications.  Refer to nursing notes for wound dimensions and assessment.  Patient stable upon completion of  procedure.  Wound dimensions unchanged post procedure.    Procedure was Performed by:  Nurse Practitioner Noreen Cheng NP      Laboratory assessments reviewed:  No results found for: PAB   Albumin (g/dL)   Date Value   10/30/2016 3.4   10/10/2016 3.5   07/08/2016 3.8      No results available in last 24 hours    Lab Results   Component Value Date    WBC 4.9 10/30/2016    GLUCOSE 96 10/30/2016    CREATININE 0.79 10/30/2016    GFRA >90 10/30/2016    GFRNA >90 10/30/2016        Culture results:  Specimen Description (no units)   Date Value   11/25/2016 SWAB LEG, LEFT TIBIA DRILL HOLE   10/10/2016 URINE, CLEAN CATCH/MIDSTREAM   05/18/2015 URINE, CLEAN CATCH/MIDSTREAM URINE   01/23/2015 URINE, CLEAN CATCH/MIDSTREAM URINE    CULTURE (no units)   Date Value   11/25/2016 NO GROWTH 4 DAYS.   10/10/2016     <10,000 CFU/mL MIXED BACTERIAL JAY WITH NO PREDOMINATING TYPE   05/18/2015 <10,000 CFU/mL GRAM POSITIVE JAY   01/23/2015 NO GROWTH.        Diagnostic Assessments Reviewed:  No new update    Nutritional Assessment:  Prealbumin and/or Albumin reviewed    Wound treatment goals are palliative:  No    DIAGNOSES:  Non-healing surgical wound , LLE     Medical Decision Making:  Patient is s/p left knee medial compartment replacement for osteoarthritis on 10/24/16 with Dr. Traylor.  He had a tibial wound dehiscence and underwent left tibial wound debridement, tibia deep debridement and irrigation, and wound VAC application on 11/25/16 with Dr. Traylor    Good forward progress. LLE wound is granular. No active infection.    Please continue Acticoat Flex dressing 2 times weekly, follow by Dome paste/Cotton/Coban Wraps 2 times weekly.  Compression therapy will be initiated to decrease the patient's edema and enhance tissue perfusion. The patient will be monitored for clinical effectiveness and tolerance. It may be adjusted in a serial fashion as indicated. The patient may require long-term compression therapy to prevent the  re-occurrence of edema.    Patient saw Dr. Traylor last week. Per patient,  follow up Xray of the left lower extremity negative for any acute findings.     Follow up in 2-3 weeks with MD/NP.     Plan of Care:  Advanced Wound Care Recommendations:    Percent Wound Closure from consult:    Care plan to augment wound closure:    Patient stable. All questions were answered.    Noreen Cheng NP  Inpatient Wound Care NP pager [phone]

## 2021-08-25 LAB
CYTO UR: NORMAL
LAB AP CASE REPORT: NORMAL
PATH REPORT.FINAL DX SPEC: NORMAL
PATH REPORT.GROSS SPEC: NORMAL

## 2021-08-26 RX ORDER — ISOSORBIDE MONONITRATE 60 MG/1
60 TABLET, EXTENDED RELEASE ORAL DAILY
Qty: 90 TABLET | Refills: 3 | Status: SHIPPED | OUTPATIENT
Start: 2021-08-26 | End: 2023-01-30

## 2021-08-31 ENCOUNTER — TELEPHONE (OUTPATIENT)
Dept: VASCULAR SURGERY | Facility: CLINIC | Age: 76
End: 2021-08-31

## 2021-08-31 NOTE — TELEPHONE ENCOUNTER
Spoke with  Matt changing his appointment from Friday, September 3rd, 2021 to Wednesday, September 1st, 2021 at 1130 am with Faith CRACAMO.  Matt confirmed he would be here.

## 2021-09-01 ENCOUNTER — OFFICE VISIT (OUTPATIENT)
Dept: VASCULAR SURGERY | Facility: CLINIC | Age: 76
End: 2021-09-01

## 2021-09-01 VITALS
WEIGHT: 200 LBS | HEART RATE: 63 BPM | HEIGHT: 68 IN | OXYGEN SATURATION: 98 % | BODY MASS INDEX: 30.31 KG/M2 | DIASTOLIC BLOOD PRESSURE: 74 MMHG | SYSTOLIC BLOOD PRESSURE: 140 MMHG

## 2021-09-01 DIAGNOSIS — I65.23 BILATERAL CAROTID ARTERY STENOSIS: Primary | ICD-10-CM

## 2021-09-01 DIAGNOSIS — E78.2 MIXED HYPERLIPIDEMIA: ICD-10-CM

## 2021-09-01 DIAGNOSIS — I10 BENIGN ESSENTIAL HTN: ICD-10-CM

## 2021-09-01 PROCEDURE — 99024 POSTOP FOLLOW-UP VISIT: CPT | Performed by: NURSE PRACTITIONER

## 2021-09-01 NOTE — PROGRESS NOTES
"9/1/2021       Abilio Martinez MD  [address] [facility] [id number]    Marika Gutierrez  1945    Chief Complaint   Patient presents with   • Follow-up     2 Week Post-Op Follow Up For LEFT CAROTID ENDARTERECTOMY WITH EEG. Patient denies any stroke like symptoms.    • Former Smoker     Patient is a Former Smoker - Quit 2004   • Med Management     Verbally verified medications with patient        Dear Abilio Martinez MD    HPI  I had the pleasure of seeing your patient Marika Gutierrez in the office today.    As you recall, Marika Gutierrez is a 75 y.o.  male who you are currently following for routine health maintenance.  He was referred for carotid disease by Dr. Hoskins.  He is maintained on Plavix and Lipitor.  He did undergo a left carotid endarterectomy on 8/18/2021.  His left neck incision has healed.     Review of Systems   Constitutional: Negative.    HENT: Negative.    Eyes: Negative.    Respiratory: Positive for shortness of breath.    Cardiovascular: Negative.    Gastrointestinal: Negative.    Endocrine: Negative.    Genitourinary: Negative.    Musculoskeletal: Negative.    Skin: Negative.    Allergic/Immunologic: Negative.    Neurological: Negative.    Hematological: Negative.    Psychiatric/Behavioral: Negative.    All other systems reviewed and are negative.    /74 (BP Location: Left arm, Patient Position: Sitting, Cuff Size: Adult)   Pulse 63   Ht 172.7 cm (68\")   Wt 90.7 kg (200 lb)   SpO2 98%   BMI 30.41 kg/m²   Physical Exam  Vitals and nursing note reviewed.   Constitutional:       General: He is not in acute distress.     Appearance: Normal appearance. He is well-developed. He is obese. He is not diaphoretic.   HENT:      Head: Normocephalic and atraumatic.   Neck:      Vascular: No carotid bruit or JVD.      Comments: Left neck incision healed  Cardiovascular:      Rate and Rhythm: Normal rate and regular rhythm.      Pulses: Normal pulses.           Femoral " pulses are 2+ on the right side and 2+ on the left side.       Popliteal pulses are 2+ on the right side and 2+ on the left side.        Dorsalis pedis pulses are 2+ on the right side and 2+ on the left side.        Posterior tibial pulses are 2+ on the right side and 2+ on the left side.      Heart sounds: S1 normal and S2 normal. Murmur heard.   No friction rub. No gallop.    Pulmonary:      Effort: Pulmonary effort is normal.      Breath sounds: Normal breath sounds.   Abdominal:      General: Bowel sounds are normal. There is no abdominal bruit.      Palpations: Abdomen is soft.      Tenderness: There is no abdominal tenderness.   Musculoskeletal:         General: Normal range of motion.      Cervical back: Neck supple.   Skin:     General: Skin is warm and dry.   Neurological:      General: No focal deficit present.      Mental Status: He is alert and oriented to person, place, and time.      Cranial Nerves: No cranial nerve deficit.   Psychiatric:         Mood and Affect: Mood normal.         Behavior: Behavior normal.         Thought Content: Thought content normal.         Judgment: Judgment normal.         Diagnostic Data:  CT ANGIOGRAM CAROTIDS- 6/24/2021 8:57 AM CDT     HISTORY: Carotid artery stenosis; I65.23-Occlusion and stenosis of  bilateral carotid arteries      COMPARISON: None     DOSE LENGTH PRODUCT: 460  mGy cm. Automated exposure control was also  utilized to decrease patient radiation dose.     TECHNIQUE: Axial images the neck are obtained following IV contrast. 2-D  and maximal intensity projection images reconstructed and reviewed     FINDINGS:  Mild to moderate atherosclerotic plaque within the arch of  the aorta. Coronary artery bypass changes are noted. There is mild  calcification of the left subclavian artery creating less than 15%  stenosis. A normal calcification right brachiocephalic artery with no  significant luminal stenosis. No prominent right subclavian artery  stenosis.      Mild tortuosity of the patent left common carotid artery. There is  moderate densely calcified plaque left carotid bulb extending into the  left proximal internal carotid artery resulting in 68 % stenosis. Left  external carotid artery is patent.     The right common carotid artery is patent. Moderate densely calcified  plaque of the right carotid bulb extending into the right proximal  internal carotid artery resulting in 50% stenosis.     The vertebral arteries originate from the proximal subclavian arteries  as expected. Dominant right vertebral artery. Calcifications of the  distal vertebral arteries resulting in less than 50% stenosis. Prominent  tapering of the left distal vertebral artery. Small caliber basilar  artery.     Calcifications in the cavernous segments of the distal internal carotid  arteries resulting in less than 25% stenosis. Fetal origin of the right  posterior cerebral artery from the anterior circulation.     No pathologic lymphadenopathy or suspicious soft tissue mass identified  within the neck. Mucous secretions at the level of the vallecula.  Emphysematous changes within the visible lung apices. At least moderate  degenerative change of the cervical spine.     IMPRESSION:  1. Images interpreted with NASCET criteria.  2. 70% stenosis of the proximal left internal carotid artery.  3. 50% stenosis of the proximal right internal carotid artery.  4. Dominant right vertebral artery with prominent tapering of the left  distal vertebral artery. Small caliber patent basilar artery.  5. Fetal origin right posterior cerebral artery from the anterior  circulation.  6. Coronary artery bypass surgical changes.  7. Emphysema.  This report was finalized on 06/24/2021 09:52 by Dr. Cynthia Jaime MD.    Patient Active Problem List   Diagnosis   • Coronary artery disease involving native coronary artery of native heart without angina pectoris   • S/P CABG (coronary artery bypass graft) 1996 [place]     • Mixed hyperlipidemia   • Benign essential HTN   • Family history of early CAD   • Ex-smoker for more than 1 year 1PPD x 40 years quit 16 years ago   • Nonrheumatic aortic valve insufficiency   • Class 1 obesity due to excess calories with serious comorbidity and body mass index (BMI) of 32.0 to 32.9 in adult   • Bilateral carotid artery stenosis   • Preop testing        Diagnosis Plan   1. Bilateral carotid artery stenosis  Ambulatory Referral to ENT (Otolaryngology)   2. Mixed hyperlipidemia     3. Benign essential HTN         Plan: After thoroughly evaluating Marika Gutierrez, I am pleased to report he is doing well status post left carotid endarterectomy.  His left neck incision has healed.  At this point he is free to resume normal activity without restriction.  He does have significant right carotid stenosis with heavy plaque in plaque ulceration.  We will need to refer him to ENT for vocal cord check and then can proceed with a right carotid endarterectomy.  Risks of carotid endarterectomy include, but are not limited to, bleeding, infection, vessel damage, nerve damage, MI, stroke, and death.  The patient understands these risks and would like to proceed with the procedure. I did discuss vascular risk factors as they pertain to the progression of vascular disease including controlling his hypertension and hyperlipidemia.  His blood pressure stable on his current medication regimen.  He is maintained on Lipitor for his hyperlipidemia. Patient's Body mass index is 30.41 kg/m². indicating that he is obese (BMI >30). Obesity-related health conditions include the following: hypertension, coronary heart disease, dyslipidemias and peripheral vascular disease. Obesity is unchanged. BMI is is above average; BMI management plan is completed. We discussed portion control and increasing exercise..  The patient is to continue taking their medications as previously discussed.   This was all discussed in full with  complete understanding.  Thank you for allowing me to participate in the care of your patient.  Please do not hesitate to call with any questions or concerns.  We will keep you aware of any further encounters with Marika Gutierrez.        Sincerely yours,         DONA Méndez Jonathan Patmor, MD

## 2021-09-10 ENCOUNTER — TELEPHONE (OUTPATIENT)
Dept: OTOLARYNGOLOGY | Facility: CLINIC | Age: 76
End: 2021-09-10

## 2021-09-23 ENCOUNTER — OFFICE VISIT (OUTPATIENT)
Dept: OTOLARYNGOLOGY | Facility: CLINIC | Age: 76
End: 2021-09-23

## 2021-09-23 VITALS
SYSTOLIC BLOOD PRESSURE: 106 MMHG | DIASTOLIC BLOOD PRESSURE: 63 MMHG | HEART RATE: 82 BPM | BODY MASS INDEX: 30.21 KG/M2 | WEIGHT: 204 LBS | HEIGHT: 69 IN

## 2021-09-23 DIAGNOSIS — Z98.890 S/P CAROTID ENDARTERECTOMY: ICD-10-CM

## 2021-09-23 DIAGNOSIS — Z86.79 HISTORY OF ASCVD: ICD-10-CM

## 2021-09-23 DIAGNOSIS — J35.1 LINGUAL TONSIL HYPERTROPHY: ICD-10-CM

## 2021-09-23 DIAGNOSIS — J38.3 VOCAL CORD DYSFUNCTION: Primary | ICD-10-CM

## 2021-09-23 DIAGNOSIS — J34.2 ACQUIRED DEVIATED NASAL SEPTUM: ICD-10-CM

## 2021-09-23 DIAGNOSIS — L71.1 RHINOPHYMA: ICD-10-CM

## 2021-09-23 DIAGNOSIS — J34.89 NASAL OBSTRUCTION: ICD-10-CM

## 2021-09-23 DIAGNOSIS — J98.8 ANATOMIC AIRWAY OBSTRUCTION: ICD-10-CM

## 2021-09-23 DIAGNOSIS — Z97.4 PRESENCE OF EXTERNAL HEARING-AID: ICD-10-CM

## 2021-09-23 DIAGNOSIS — H90.3 SENSORINEURAL HEARING LOSS (SNHL) OF BOTH EARS: ICD-10-CM

## 2021-09-23 PROCEDURE — 31575 DIAGNOSTIC LARYNGOSCOPY: CPT | Performed by: OTOLARYNGOLOGY

## 2021-09-23 PROCEDURE — 99203 OFFICE O/P NEW LOW 30 MIN: CPT | Performed by: OTOLARYNGOLOGY

## 2021-09-23 NOTE — PROGRESS NOTES
[facility] Otolaryngology Head and Neck Surgery  CLINIC NOTE    Chief Complaint   Patient presents with   • Vocal Cord Check          HPI   New Patient  Accompanied by:  No one  Marika Gutierrez is a  75 y.o. male with s/p L CEA. He is to have R side surgery soon.  Following L side, had a little hoarseness immediately. He recovered quickly.  No voice issues  No swallowing issues.  Smoke- quit 15 yrs ago. Not heavy, 1 ppd  Drink- none    He is status post No surgery found on No surgery found.        History     Last Reviewed by Sanjay Martinez Jr., MD on 9/23/2021 at  9:25 AM    Sections Reviewed    Medical, Family, Tobacco, Surgical      Problem list reviewed by Sanjay Martinez Jr., MD on 9/23/2021 at  9:25 AM  Medicines reviewed by Sanjay Martinez Jr., MD on 9/23/2021 at  9:25 AM  Allergies reviewed by Sanjay Martinez Jr., MD on 9/23/2021 at  9:25 AM         Vital Signs:   Heart Rate:  [82] 82  BP: (106)/(63) 106/63    Physical Exam  Vitals reviewed.   Constitutional:       Appearance: Normal appearance. He is well-developed and normal weight.      Comments: Normal voice  [place]   HENT:      Head: Normocephalic and atraumatic.      Comments: Mild rhinophyma in medial cheeks and glabella     Right Ear: Tympanic membrane, ear canal and external ear normal. Decreased hearing noted.      Left Ear: Tympanic membrane, ear canal and external ear normal. Decreased hearing noted.      Ears:      Comments: AS- hearing aid present     Nose: Nasal deformity (Moderate rhinophyma) present.      Right Turbinates: Not enlarged or swollen.      Left Turbinates: Not enlarged or swollen.        Mouth/Throat:      Dentition: Abnormal dentition (edentulous upper and lower). Has dentures (upper and lower). No gum lesions.      Tongue: No lesions. Tongue does not deviate from midline.      Palate: No mass and lesions.      Pharynx: Oropharynx is clear. Uvula midline.      Tonsils: No tonsillar  exudate or tonsillar abscesses.      Comments: Macroglossia- moderate  Prolapse moderate  Eyes:      General: Lids are normal. Gaze aligned appropriately.      Extraocular Movements: Extraocular movements intact.      Conjunctiva/sclera: Conjunctivae normal.      Comments: Color blue   Neck:      Thyroid: No thyroid mass or thyromegaly.      Trachea: Phonation normal.        Comments: Lordotic  Larynx low  Trachea mildly deep  Cardiovascular:      Rate and Rhythm: Normal rate and regular rhythm.   Pulmonary:      Effort: Pulmonary effort is normal. No tachypnea or respiratory distress.      Breath sounds: Normal breath sounds. No stridor.   Musculoskeletal:      Cervical back: Decreased range of motion.   Lymphadenopathy:      Cervical: No cervical adenopathy.   Skin:     Findings: No rash.   Neurological:      General: No focal deficit present.      Mental Status: He is alert and oriented to person, place, and time.      Cranial Nerves: Cranial nerves are intact. No cranial nerve deficit.   Psychiatric:         Attention and Perception: Attention and perception normal.         Mood and Affect: Mood and affect normal.         Speech: Speech normal.         Behavior: Behavior normal. Behavior is cooperative.         Thought Content: Thought content normal.         Cognition and Memory: Cognition and memory normal.         Judgment: Judgment normal.               Result Review       I have reviewed the patients old records in the chart.  The following results/records were reviewed:    Referral to Otolaryngology for Bilateral carotid artery stenosis (09/01/2021)  DONA Méndez:  Plan: After thoroughly evaluating Marika Gutierrez, I am pleased to report he is doing well status post left carotid endarterectomy.  His left neck incision has healed.  At this point he is free to resume normal activity without restriction.  He does have significant right carotid stenosis with heavy plaque in plaque ulceration.  We  will need to refer him to ENT for vocal cord check and then can proceed with a right carotid endarterectomy.  Risks of carotid endarterectomy include, but are not limited to, bleeding, infection, vessel damage, nerve damage, MI, stroke, and death.  The patient understands these risks and would like to proceed with the procedure. I did discuss vascular risk factors as they pertain to the progression of vascular disease including controlling his hypertension and hyperlipidemia.  His blood pressure stable on his current medication regimen.  He is maintained on Lipitor for his hyperlipidemia. Patient's Body mass index is 30.41 kg/m². indicating that he is obese (BMI >30). Obesity-related health conditions include the following: hypertension, coronary heart disease, dyslipidemias and peripheral vascular disease. Obesity is unchanged. BMI is is above average; BMI management plan is completed. We discussed portion control and increasing exercise..  The patient is to continue taking their medications as previously discussed.   This was all discussed in full with complete understanding.            Assessment:        Diagnosis Plan   1. Vocal cord dysfunction      None noted on exam today   2. History of ASCVD      Planned right carotid endarterectomy   3. S/P carotid endarterectomy      Left-sided   4. Acquired deviated nasal septum      R to L high and low mod to severe  L to R mid anterior mod to severe   5. 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 Presence of external hearing-aid      L side   8. Lingual tonsil hypertrophy     9. Anatomic airway obstruction     10. Nasal obstruction                Plan:        Cleared for surgery from ENT standpoint  Patient has no apparent vocal cord paralysis.  He is cleared for surgery from an ENT standpoint.  He has numerous other ENT problems for which he can return if  he desires work-up.  Cleared for surgery, no TVC paralysis  Audio when patient stable  Consider therapy rhinophyma             MY CHART:  Patient is Patient is using My Chart    Patient, Spouse understand(s) and agree(s) with the treatment plan as described.    Return if symptoms worsen or fail to improve, for Recheck ears, nose and lingual tonsils.            Sanjay Martinez Jr, MD  09/23/21  09:26 CDT

## 2021-09-23 NOTE — PATIENT INSTRUCTIONS
You are cleared for surgery from ENT standpoint    Make appointment for hearing recheck, airway recheck and nose therapy when desired.    Consider hearing aids.    CONTACT INFORMATION:  The main office phone number is [phone]. For emergencies after hours and on weekends, this number will convert over to our answering service and the on call provider will answer. Please try to keep non emergent phone calls/ questions to office hours 9am-5pm Monday through Friday.     HandInScan  As an alternative, you can sign up and use the Epic MyChart system for more direct and quicker access for non emergent questions/ problems.  [facility] HandInScan allows you to send messages to your doctor, view your test results, renew your prescriptions, schedule appointments, and more. To sign up, go to [employer] and click on the Sign Up Now link in the New User? box. Enter your HandInScan Activation Code exactly as it appears below along with the last four digits of your Social Security Number and your Date of Birth () to complete the sign-up process. If you do not sign up before the expiration date, you must request a new code.    HandInScan Activation Code: Activation code not generated  Current HandInScan Status: Active    If you have questions, you can email [email or web address] or call [phone] to talk to our HandInScan staff. Remember, HandInScan is NOT to be used for urgent needs. For medical emergencies, dial 911.

## 2021-09-23 NOTE — PROGRESS NOTES
[facility] Otolaryngology Head and Neck Surgery  PROCEDURE NOTE    Anesthesia:   topical 4% tetracaine and oxymetazoline mix    Endoscopy Type:   Flexible Laryngoscopy    Indications for Procedure:   1. Vocal cord dysfunction    2. History of ASCVD    3. S/P carotid endarterectomy         Procedure Details:    The patient was placed in an upright position.  The laryngoscope was passed left nasal passge.  The nasal cavities, nasopharynx, oropharynx, hypopharynx, and larynx were all examined.  Vocal cords were examined during respiration and phonation.  The following findings were noted:    Findings:   LARYNGOSCOPY FINDINGS :    NASOPHARYNX:     Adenoids: Red, thick    Eustachian tubes:         BILATERAL: Red, thick  OROPHARYNX:     Lateral walls:         Redundant          BILATERAL: Redundant, red    Posterior walls:        Red, thick    BASEOF TONGUE:          prolapse  moderate    LINGUAL TONSILS:          BILATERAL: Moderately enlarged    VALLECULA:         normal, no lesions, no pooling  Bilateral  EPIGLOTTIS:    Position: Mildly retroverted    Color: Red    Mucosa: Intact, no lesions    Shape: Mildly curled, stiff  HYPOPHARYNX:    Lateral walls:         BILATERAL: Redundant, red    Posterior wall:        Red, thick  ARYEPIGLOTTIC FOLDS:     Abnormal:        BILATERAL: Red, thick  ARYTENOIDS:     Mobility:       Normal   Bilateral    [place] size:         BILATERAL: Red, enlarged  FALSE CORDS:     mucosa:         BILATERAL: Red, thick    mobility:         BILATERAL: Intact  TRUE VOCAL FOLDS:      appearance:         BILATERAL: Thickened    mobility        BILATERAL: Intact, symmetric, normal excursion    mucosa:        BILATERAL: Mildly red, submucosal mild edema  GLOTTIS:     normal closure, with good cord apposition  SUBGLOTTIS:     unobstructed, patent, no lesions    Condition:  Stable.  Patient tolerated procedure well.    Complications:  None    Post-procedure instructions reviewed with  Patient, Spouse  Instructions documented in AVS for patient to review

## 2021-09-27 ENCOUNTER — PREP FOR SURGERY (OUTPATIENT)
Dept: OTHER | Facility: HOSPITAL | Age: 76
End: 2021-09-27

## 2021-09-27 DIAGNOSIS — Z01.818 PREOP TESTING: ICD-10-CM

## 2021-09-27 DIAGNOSIS — I65.21 STENOSIS OF RIGHT CAROTID ARTERY: Primary | ICD-10-CM

## 2021-09-27 DIAGNOSIS — Z79.02 ENCOUNTER FOR MONITORING ANTIPLATELET THERAPY: ICD-10-CM

## 2021-09-27 DIAGNOSIS — Z51.81 ENCOUNTER FOR MONITORING ANTIPLATELET THERAPY: ICD-10-CM

## 2021-09-27 RX ORDER — BUPIVACAINE HCL/0.9 % NACL/PF 0.1 %
2 PLASTIC BAG, INJECTION (ML) EPIDURAL ONCE
Status: CANCELLED | OUTPATIENT
Start: 2021-09-27 | End: 2021-09-27

## 2021-09-30 ENCOUNTER — TELEPHONE (OUTPATIENT)
Dept: VASCULAR SURGERY | Facility: CLINIC | Age: 76
End: 2021-09-30

## 2021-09-30 NOTE — TELEPHONE ENCOUNTER
Left detailed message advising patient of his upcoming procedure with Dr. Chang.  Mailed all information to patient.   Patient pre work is scheduled for 10/15/05007 at 1015 am.  Patient procedure is scheduled for 10/18/2021 at 800 am.  Patient advised of location time and prep.

## 2021-10-12 DIAGNOSIS — Z01.818 PREOP TESTING: Primary | ICD-10-CM

## 2021-10-15 ENCOUNTER — LAB (OUTPATIENT)
Dept: LAB | Facility: HOSPITAL | Age: 76
End: 2021-10-15

## 2021-10-15 ENCOUNTER — TELEPHONE (OUTPATIENT)
Dept: VASCULAR SURGERY | Facility: CLINIC | Age: 76
End: 2021-10-15

## 2021-10-15 ENCOUNTER — PRE-ADMISSION TESTING (OUTPATIENT)
Dept: PREADMISSION TESTING | Facility: HOSPITAL | Age: 76
End: 2021-10-15

## 2021-10-15 VITALS
SYSTOLIC BLOOD PRESSURE: 154 MMHG | OXYGEN SATURATION: 98 % | HEIGHT: 68 IN | RESPIRATION RATE: 16 BRPM | WEIGHT: 203.93 LBS | DIASTOLIC BLOOD PRESSURE: 65 MMHG | HEART RATE: 66 BPM | BODY MASS INDEX: 30.91 KG/M2

## 2021-10-15 DIAGNOSIS — Z01.818 PREOP TESTING: ICD-10-CM

## 2021-10-15 DIAGNOSIS — I65.21 STENOSIS OF RIGHT CAROTID ARTERY: ICD-10-CM

## 2021-10-15 DIAGNOSIS — Z79.02 ENCOUNTER FOR MONITORING ANTIPLATELET THERAPY: ICD-10-CM

## 2021-10-15 DIAGNOSIS — Z51.81 ENCOUNTER FOR MONITORING ANTIPLATELET THERAPY: ICD-10-CM

## 2021-10-15 LAB
ANION GAP SERPL CALCULATED.3IONS-SCNC: 8 MMOL/L (ref 5–15)
APTT PPP: 28.8 SECONDS (ref 24.1–35)
BASOPHILS # BLD AUTO: 0.03 10*3/MM3 (ref 0–0.2)
BASOPHILS NFR BLD AUTO: 0.4 % (ref 0–1.5)
BUN SERPL-MCNC: 23 MG/DL (ref 8–23)
BUN/CREAT SERPL: 17.8 (ref 7–25)
CALCIUM SPEC-SCNC: 9.4 MG/DL (ref 8.6–10.5)
CHLORIDE SERPL-SCNC: 103 MMOL/L (ref 98–107)
CO2 SERPL-SCNC: 27 MMOL/L (ref 22–29)
CREAT SERPL-MCNC: 1.29 MG/DL (ref 0.76–1.27)
DEPRECATED RDW RBC AUTO: 40.7 FL (ref 37–54)
EOSINOPHIL # BLD AUTO: 0.34 10*3/MM3 (ref 0–0.4)
EOSINOPHIL NFR BLD AUTO: 4.5 % (ref 0.3–6.2)
ERYTHROCYTE [DISTWIDTH] IN BLOOD BY AUTOMATED COUNT: 13.1 % (ref 12.3–15.4)
GFR SERPL CREATININE-BSD FRML MDRD: 54 ML/MIN/1.73
GLUCOSE SERPL-MCNC: 146 MG/DL (ref 65–99)
HCT VFR BLD AUTO: 41.3 % (ref 37.5–51)
HGB BLD-MCNC: 13.7 G/DL (ref 13–17.7)
IMM GRANULOCYTES # BLD AUTO: 0.05 10*3/MM3 (ref 0–0.05)
IMM GRANULOCYTES NFR BLD AUTO: 0.7 % (ref 0–0.5)
INR PPP: 0.97 (ref 0.91–1.09)
LYMPHOCYTES # BLD AUTO: 1.57 10*3/MM3 (ref 0.7–3.1)
LYMPHOCYTES NFR BLD AUTO: 20.5 % (ref 19.6–45.3)
MCH RBC QN AUTO: 28.5 PG (ref 26.6–33)
MCHC RBC AUTO-ENTMCNC: 33.2 G/DL (ref 31.5–35.7)
MCV RBC AUTO: 85.9 FL (ref 79–97)
MONOCYTES # BLD AUTO: 0.56 10*3/MM3 (ref 0.1–0.9)
MONOCYTES NFR BLD AUTO: 7.3 % (ref 5–12)
NEUTROPHILS NFR BLD AUTO: 5.09 10*3/MM3 (ref 1.7–7)
NEUTROPHILS NFR BLD AUTO: 66.6 % (ref 42.7–76)
NRBC BLD AUTO-RTO: 0 /100 WBC (ref 0–0.2)
PLATELET # BLD AUTO: 213 10*3/MM3 (ref 140–450)
PMV BLD AUTO: 9.1 FL (ref 6–12)
POTASSIUM SERPL-SCNC: 4.5 MMOL/L (ref 3.5–5.2)
PROTHROMBIN TIME: 12.5 SECONDS (ref 11.9–14.6)
RBC # BLD AUTO: 4.81 10*6/MM3 (ref 4.14–5.8)
SARS-COV-2 ORF1AB RESP QL NAA+PROBE: NOT DETECTED
SODIUM SERPL-SCNC: 138 MMOL/L (ref 136–145)
WBC # BLD AUTO: 7.64 10*3/MM3 (ref 3.4–10.8)

## 2021-10-15 PROCEDURE — 85025 COMPLETE CBC W/AUTO DIFF WBC: CPT

## 2021-10-15 PROCEDURE — U0004 COV-19 TEST NON-CDC HGH THRU: HCPCS

## 2021-10-15 PROCEDURE — 85610 PROTHROMBIN TIME: CPT

## 2021-10-15 PROCEDURE — C9803 HOPD COVID-19 SPEC COLLECT: HCPCS

## 2021-10-15 PROCEDURE — 93005 ELECTROCARDIOGRAM TRACING: CPT

## 2021-10-15 PROCEDURE — 80048 BASIC METABOLIC PNL TOTAL CA: CPT

## 2021-10-15 PROCEDURE — 85730 THROMBOPLASTIN TIME PARTIAL: CPT

## 2021-10-15 PROCEDURE — 36415 COLL VENOUS BLD VENIPUNCTURE: CPT

## 2021-10-15 PROCEDURE — 93010 ELECTROCARDIOGRAM REPORT: CPT | Performed by: INTERNAL MEDICINE

## 2021-10-15 RX ORDER — ALFUZOSIN HYDROCHLORIDE 10 MG/1
10 TABLET, EXTENDED RELEASE ORAL DAILY
COMMUNITY
End: 2023-03-09 | Stop reason: SDUPTHER

## 2021-10-15 NOTE — DISCHARGE INSTRUCTIONS
DAY OF SURGERY INSTRUCTIONS          ARRIVAL TIME: AS DIRECTED BY OFFICE    YOU MAY TAKE THE FOLLOWING MEDICATION(S) THE MORNING OF SURGERY WITH A SIP OF WATER: none       ALL OTHER HOME MEDICATION CHECK WITH YOUR PHYSICIAN      DO NOT TAKE ANY ERECTILE DYSFUNCTION MEDICATIONS (EX: CIALIS, VIAGRA) 24 HOURS PRIOR TO SURGERY                      MANAGING PAIN AFTER SURGERY    We know you are probably wondering what your pain will be like after surgery.  Following surgery it is unrealistic to expect you will not have pain.   Pain is how our bodies let us know that something is wrong or cautions us to be careful.  That said, our goal is to make your pain tolerable.    Methods we may use to treat your pain include (oral or IV medications, PCAs, epidurals, nerve blocks, etc.)   While some procedures require IV pain medications for a short time after surgery, transitioning to pain medications by mouth allows for better management of pain.   Your nurse will encourage you to take oral pain medications whenever possible.  IV medications work almost immediately, but only last a short while.  Taking medications by mouth allows for a more constant level of medication in your blood stream for a longer period of time.      Once your pain is out of control it is harder to get back under control.  It is important you are aware when your next dose of pain medication is due.  If you are admitted, your nurse may write the time of your next dose on the white board in your room to help you remember.      We are interested in your pain and encourage you to inform us about aggravating factors during your visit.   Many times a simple repositioning every few hours can make a big difference.    If your physician says it is okay, do not let your pain prevent you from getting out of bed. Be sure to call your nurse for assistance prior to getting up so you do not fall.      Before surgery, please decide your tolerable pain goal.  These faces  help describe the pain ratings we use on a 0-10 scale.   Be prepared to tell us your goal and whether or not you take pain or anxiety medications at home.          BEFORE YOU COME TO THE HOSPITAL  (Pre-op instructions)  • Do not eat, drink, smoke or chew gum after midnight the night before surgery.  This also includes no mints.  • Morning of surgery take only the medicines you have been instructed with a sip of water unless otherwise instructed  by your physician.  • Do not shave, wear makeup or dark nail polish.  • Remove all jewelry including rings.  • Leave anything you consider valuable at home.  • Leave your suitcase in the car until after your surgery.  • Bring the following with you if applicable:  o Picture ID and insurance, Medicare or Medicaid cards  o Co-pay/deductible required by insurance (cash, check, credit card)  o Copy of advance directive, living will or power-of- documents if not brought to PAT  o CPAP or BIPAP mask and tubing  o Relaxation aids ( book, magazine), etc.  o Hearing aids                        ON THE DAY OF SURGERY  · On the day of surgery check in at registration located at the main entrance of the hospital.   ? You will be registered and given a beeper with instructions where to wait in the main lobby.  ? When your beeper lights up and vibrates a member of the Outpatient Surgery staff will meet you at the double doors under the stair steps and escort you to your preoperative room.   · You may have cloth compression devices placed on your legs. These help to prevent blood clots and reduce swelling in your legs.  · An IV may be inserted into one of your veins.  · In the operating room, you may be given one or more of the following:  ? A medicine to help you relax (sedative).  ? A medicine to numb the area (local anesthetic).  ? A medicine to make you fall asleep (general anesthetic).  ? A medicine that is injected into an area of your body to numb everything below the  "injection site (regional anesthetic).  · Your surgical site will be marked or identified.  · You may be given an antibiotic through your IV to help prevent infection.  Contact a health care provider if you:  · Develop a fever of more than 100.4°F (38°C) or other feelings of illness during the 48 hours before your surgery.  · Have symptoms that get worse.  Have questions or concerns about your surgery    General Anesthesia/Surgery, Adult  General anesthesia is the use of medicines to make a person \"go to sleep\" (unconscious) for a medical procedure. General anesthesia must be used for certain procedures, and is often recommended for procedures that:  · Last a long time.  · Require you to be still or in an unusual position.  · Are major and can cause blood loss.  The medicines used for general anesthesia are called general anesthetics. As well as making you unconscious for a certain amount of time, these medicines:  · Prevent pain.  · Control your blood pressure.  · Relax your muscles.  Tell a health care provider about:  · Any allergies you have.  · All medicines you are taking, including vitamins, herbs, eye drops, creams, and over-the-counter medicines.  · Any problems you or family members have had with anesthetic medicines.  · Types of anesthetics you have had in the past.  · Any blood disorders you have.  · Any surgeries you have had.  · Any medical conditions you have.  · Any recent upper respiratory, chest, or ear infections.  · Any history of:  ? Heart or lung conditions, such as heart failure, sleep apnea, asthma, or chronic obstructive pulmonary disease (COPD).  ?  service.  ? Depression or anxiety.  · Any tobacco or drug use, including marijuana or alcohol use.  · Whether you are pregnant or may be pregnant.  What are the risks?  Generally, this is a safe procedure. However, problems may occur, including:  · Allergic reaction.  · Lung and heart problems.  · Inhaling food or liquid from the stomach " into the lungs (aspiration).  · Nerve injury.  · Air in the bloodstream, which can lead to stroke.  · Extreme agitation or confusion (delirium) when you wake up from the anesthetic.  · Waking up during your procedure and being unable to move. This is rare.  These problems are more likely to develop if you are having a major surgery or if you have an advanced or serious medical condition. You can prevent some of these complications by answering all of your health care provider's questions thoroughly and by following all instructions before your procedure.  General anesthesia can cause side effects, including:  · Nausea or vomiting.  · A sore throat from the breathing tube.  · Hoarseness.  · Wheezing or coughing.  · Shaking chills.  · Tiredness.  · Body aches.  · Anxiety.  · Sleepiness or drowsiness.  · Confusion or agitation.  RISKS AND COMPLICATIONS OF SURGERY  Your health care provider will discuss possible risks and complications with you before surgery. Common risks and complications include:    · Problems due to the use of anesthetics.  · Blood loss and replacement (does not apply to minor surgical procedures).  · Temporary increase in pain due to surgery.  · Uncorrected pain or problems that the surgery was meant to correct.  · Infection.  · New damage.    What happens before the procedure?    Medicines  Ask your health care provider about:  · Changing or stopping your regular medicines. This is especially important if you are taking diabetes medicines or blood thinners.  · Taking medicines such as aspirin and ibuprofen. These medicines can thin your blood. Do not take these medicines unless your health care provider tells you to take them.  · Taking over-the-counter medicines, vitamins, herbs, and supplements. Do not take these during the week before your procedure unless your health care provider approves them.  General instructions  · Starting 3-6 weeks before the procedure, do not use any products that  contain nicotine or tobacco, such as cigarettes and e-cigarettes. If you need help quitting, ask your health care provider.  · If you brush your teeth on the morning of the procedure, make sure to spit out all of the toothpaste.  · Tell your health care provider if you become ill or develop a cold, cough, or fever.  · If instructed by your health care provider, bring your sleep apnea device with you on the day of your surgery (if applicable).  · Ask your health care provider if you will be going home the same day, the following day, or after a longer hospital stay.  ? Plan to have someone take you home from the hospital or clinic.  ? Plan to have a responsible adult care for you for at least 24 hours after you leave the hospital or clinic. This is important.  What happens during the procedure?  · You will be given anesthetics through both of the following:  ? A mask placed over your nose and mouth.  ? An IV in one of your veins.  · You may receive a medicine to help you relax (sedative).  · After you are unconscious, a breathing tube may be inserted down your throat to help you breathe. This will be removed before you wake up.  · An anesthesia specialist will stay with you throughout your procedure. He or she will:  ? Keep you comfortable and safe by continuing to give you medicines and adjusting the amount of medicine that you get.  ? Monitor your blood pressure, pulse, and oxygen levels to make sure that the anesthetics do not cause any problems.  The procedure may vary among health care providers and hospitals.  What happens after the procedure?  · Your blood pressure, temperature, heart rate, breathing rate, and blood oxygen level will be monitored until the medicines you were given have worn off.  · You will wake up in a recovery area. You may wake up slowly.  · If you feel anxious or agitated, you may be given medicine to help you calm down.  · If you will be going home the same day, your health care provider  may check to make sure you can walk, drink, and urinate.  · Your health care provider will treat any pain or side effects you have before you go home.  · Do not drive for 24 hours if you were given a sedative.  Summary  · General anesthesia is used to keep you still and prevent pain during a procedure.  · It is important to tell your healthcare provider about your medical history and any surgeries you have had, and previous experience with anesthesia.  · Follow your healthcare provider’s instructions about when to stop eating, drinking, or taking certain medicines before your procedure.  · Plan to have someone take you home from the hospital or clinic.  This information is not intended to replace advice given to you by your health care provider. Make sure you discuss any questions you have with your health care provider.  Document Released: 03/26/2009 Document Revised: 08/03/2018 Document Reviewed: 08/03/2018  [employer] Interactive Patient Education © 2019 [employer]       Fall Prevention in Hospitals, Adult  As a hospital patient, your condition and the treatments you receive can increase your risk for falls. Some additional risk factors for falls in a hospital include:  · Being in an unfamiliar environment.  · Being on bed rest.  · Your surgery.  · Taking certain medicines.  · Your tubing requirements, such as intravenous (IV) therapy or catheters.  It is important that you learn how to decrease fall risks while at the hospital. Below are important tips that can help prevent falls.  SAFETY TIPS FOR PREVENTING FALLS  Talk about your risk of falling.  · Ask your health care provider why you are at risk for falling. Is it your medicine, illness, tubing placement, or something else?  · Make a plan with your health care provider to keep you safe from falls.  · Ask your health care provider or pharmacist about side effects of your medicines. Some medicines can make you dizzy or affect your coordination.  Ask for  help.  · Ask for help before getting out of bed. You may need to press your call button.  · Ask for assistance in getting safely to the toilet.  · Ask for a walker or cane to be put at your bedside. Ask that most of the side rails on your bed be placed up before your health care provider leaves the room.  · Ask family or friends to sit with you.  · Ask for things that are out of your reach, such as your glasses, hearing aids, telephone, bedside table, or call button.  Follow these tips to avoid falling:  · Stay lying or seated, rather than standing, while waiting for help.  · Wear rubber-soled slippers or shoes whenever you walk in the hospital.  · Avoid quick, sudden movements.  ¨ Change positions slowly.  ¨ Sit on the side of your bed before standing.  ¨ Stand up slowly and wait before you start to walk.  · Let your health care provider know if there is a spill on the floor.  · Pay careful attention to the medical equipment, electrical cords, and tubes around you.  · When you need help, use your call button by your bed or in the bathroom. Wait for one of your health care providers to help you.  · If you feel dizzy or unsure of your footing, return to bed and wait for assistance.  · Avoid being distracted by the TV, telephone, or another person in your room.  · Do not lean or support yourself on rolling objects, such as IV poles or bedside tables.     This information is not intended to replace advice given to you by your health care provider. Make sure you discuss any questions you have with your health care provider.     Document Released: 12/15/2001 Document Revised: 01/08/2016 Document Reviewed: 08/25/2013  [employer] Interactive Patient Education ©2016 [employer]       [facility]  CHG 4% Patient Instruction Sheet    Chlorhexidine Before Surgery  Chlorhexidine gluconate (CHG) is a germ-killing (antiseptic) solution that is used to clean the skin. It gets rid of the bacteria that normally live on  the skin. Cleaning your skin with CHG before surgery helps lower the risk for infection after surgery.    How to use CHG solution  · You will take 2 showers, one shower the night before surgery, the second shower the morning of surgery before coming to the hospital.  · Use CHG only as told by your health care provider, and follow the instructions on the label.  · Use CHG solution while taking a shower. Follow these steps when using CHG solution (unless your health care provider gives you different instructions):  1. Start the shower.  2. Use your normal soap and shampoo to wash your face and hair.  3. Turn off the shower or move out of the shower stream.  4. Pour the CHG onto a clean washcloth. Do not use any type of brush or rough-edged sponge.  5. Starting at your neck, lather your body down to your toes. Make sure you:  6. Pay special attention to the part of your body where you will be having surgery. Scrub this area for at least 1 minute.  7. Use the full amount of CHG as directed. Usually, this is one half bottle for each shower.  8. Do not use CHG on your head or face. If the solution gets into your ears or eyes, rinse them well with water.  9. Avoid your genital area.  10. Avoid any areas of skin that have broken skin, cuts, or scrapes.  11. Scrub your back and under your arms. Make sure to wash skin folds.  12. Let the lather sit on your skin for 1-2 minutes or as long as told by your health care  provider.  13. Thoroughly rinse your entire body in the shower. Make sure that all body creases and crevices are rinsed well.  14. Dry off with a clean towel. Do not put any substances on your body afterward, such as powder, lotion, or perfume.  15. Put on clean clothes or pajamas.  16. If it is the night before your surgery, sleep in clean sheets.    What are the risks?  Risks of using CHG include:  · A skin reaction.  · Hearing loss, if CHG gets in your ears.  · Eye injury, if CHG gets in your eyes and is not  rinsed out.  · The CHG product catching fire.  Make sure that you avoid smoking and flames after applying CHG to your skin.  Do not use CHG:  · If you have a chlorhexidine allergy or have previously reacted to chlorhexidine.  · On babies younger than 2 months of age.      On the day of surgery, when you are taken to your room in Outpatient Surgery you will be given a CHG prepackaged cloth to wipe the site for your surgery.  How to use CHG prepackaged cloths  · Follow the instructions on the label.  · Use the CHG cloth on clean, dry skin. Follow these steps when using a CHG cloth (unless your health care provider gives you different instructions):  1. Using the CHG cloth, vigorously scrub the part of your body where you will be having surgery. Scrub using a back-and-forth motion for 3 minutes. The area on your body should be completely wet with CHG when you are finished scrubbing.  2. Do not rinse. Discard the cloth and let the area air-dry for 1 minute. Do not put any substances on your body afterward, such as powder, lotion, or perfume.  Contact a health care provider if:  · Your skin gets irritated after scrubbing.  · You have questions about using your solution or cloth.  Get help right away if:  · Your eyes become very red or swollen.  · Your eyes itch badly.  · Your skin itches badly and is red or swollen.  · Your hearing changes.  · You have trouble seeing.  · You have swelling or tingling in your mouth or throat.  · You have trouble breathing.  · You swallow any chlorhexidine.  Summary  · Chlorhexidine gluconate (CHG) is a germ-killing (antiseptic) solution that is used to clean the skin. Cleaning your skin with CHG before surgery helps lower the risk for infection after surgery.  · You may be given CHG to use at home. It may be in a bottle or in a prepackaged cloth to use on your skin. Carefully follow your health care provider's instructions and the instructions on the product label.  · Do not use CHG if  you have a chlorhexidine allergy.  · Contact your health care provider if your skin gets irritated after scrubbing.  This information is not intended to replace advice given to you by your health care provider. Make sure you discuss any questions you have with your health care provider.  Document Released: 09/11/2013 Document Revised: 11/15/2018 Document Reviewed: 11/15/2018  [employer] Interactive Patient Education © 2019 [employer]          PATIENT/FAMILY/RESPONSIBLE PARTY VERBALIZES UNDERSTANDING OF ABOVE EDUCATION.  COPY OF PAIN SCALE GIVEN AND REVIEWED WITH VERBALIZED UNDERSTANDING.

## 2021-10-15 NOTE — TELEPHONE ENCOUNTER
Spoke with patient and reminded of 800 arrival time for his procedure with Dr. Chang.  Patient expressed understanding for all that was discussed.

## 2021-10-16 LAB
QT INTERVAL: 412 MS
QTC INTERVAL: 414 MS

## 2021-10-18 ENCOUNTER — HOSPITAL ENCOUNTER (INPATIENT)
Facility: HOSPITAL | Age: 76
LOS: 1 days | Discharge: HOME | End: 2021-10-19
Attending: SURGERY | Admitting: SURGERY

## 2021-10-18 ENCOUNTER — ANESTHESIA EVENT (OUTPATIENT)
Dept: PERIOP | Facility: HOSPITAL | Age: 76
End: 2021-10-18

## 2021-10-18 ENCOUNTER — APPOINTMENT (OUTPATIENT)
Dept: ULTRASOUND IMAGING | Facility: HOSPITAL | Age: 76
End: 2021-10-18

## 2021-10-18 ENCOUNTER — ANESTHESIA (OUTPATIENT)
Dept: PERIOP | Facility: HOSPITAL | Age: 76
End: 2021-10-18

## 2021-10-18 DIAGNOSIS — I65.21 STENOSIS OF RIGHT CAROTID ARTERY: ICD-10-CM

## 2021-10-18 DIAGNOSIS — Z01.818 PREOP TESTING: ICD-10-CM

## 2021-10-18 LAB
ABO GROUP BLD: NORMAL
BLD GP AB SCN SERPL QL: NEGATIVE
RH BLD: POSITIVE
T&S EXPIRATION DATE: NORMAL

## 2021-10-18 PROCEDURE — 25010000002 PROPOFOL 10 MG/ML EMULSION

## 2021-10-18 PROCEDURE — 88311 DECALCIFY TISSUE: CPT | Performed by: SURGERY

## 2021-10-18 PROCEDURE — 94799 UNLISTED PULMONARY SVC/PX: CPT

## 2021-10-18 PROCEDURE — 03UK0KZ SUPPLEMENT RIGHT INTERNAL CAROTID ARTERY WITH NONAUTOLOGOUS TISSUE SUBSTITUTE, OPEN APPROACH: ICD-10-PCS | Performed by: SURGERY

## 2021-10-18 PROCEDURE — 25010000002 DEXAMETHASONE PER 1 MG

## 2021-10-18 PROCEDURE — 25010000002 HEPARIN (PORCINE) PER 1000 UNITS

## 2021-10-18 PROCEDURE — 25010000002 PROTAMINE SULFATE PER 10 MG

## 2021-10-18 PROCEDURE — 88304 TISSUE EXAM BY PATHOLOGIST: CPT | Performed by: SURGERY

## 2021-10-18 PROCEDURE — 25010000002 CEFAZOLIN PER 500 MG: Performed by: NURSE PRACTITIONER

## 2021-10-18 PROCEDURE — 86850 RBC ANTIBODY SCREEN: CPT | Performed by: NURSE PRACTITIONER

## 2021-10-18 PROCEDURE — 86900 BLOOD TYPING SEROLOGIC ABO: CPT | Performed by: NURSE PRACTITIONER

## 2021-10-18 PROCEDURE — 25010000002 PHENYLEPHRINE 10 MG/ML SOLUTION 5 ML VIAL

## 2021-10-18 PROCEDURE — 25010000002 FENTANYL CITRATE (PF) 100 MCG/2ML SOLUTION

## 2021-10-18 PROCEDURE — 93882 EXTRACRANIAL UNI/LTD STUDY: CPT

## 2021-10-18 PROCEDURE — 93882 EXTRACRANIAL UNI/LTD STUDY: CPT | Performed by: SURGERY

## 2021-10-18 PROCEDURE — 35301 RECHANNELING OF ARTERY: CPT | Performed by: SURGERY

## 2021-10-18 PROCEDURE — C1768 GRAFT, VASCULAR: HCPCS | Performed by: SURGERY

## 2021-10-18 PROCEDURE — 25010000003 CEFAZOLIN PER 500 MG: Performed by: SURGERY

## 2021-10-18 PROCEDURE — 4A10X4G MONITORING OF CENTRAL NERVOUS ELECTRICAL ACTIVITY, INTRAOPERATIVE, EXTERNAL APPROACH: ICD-10-PCS | Performed by: SURGERY

## 2021-10-18 PROCEDURE — 03CK0ZZ EXTIRPATION OF MATTER FROM RIGHT INTERNAL CAROTID ARTERY, OPEN APPROACH: ICD-10-PCS | Performed by: SURGERY

## 2021-10-18 PROCEDURE — 25010000002 CEFAZOLIN PER 500 MG: Performed by: SURGERY

## 2021-10-18 PROCEDURE — 25010000003 LIDOCAINE 1 % SOLUTION: Performed by: SURGERY

## 2021-10-18 PROCEDURE — 25010000002 ONDANSETRON PER 1 MG

## 2021-10-18 PROCEDURE — 86901 BLOOD TYPING SEROLOGIC RH(D): CPT | Performed by: NURSE PRACTITIONER

## 2021-10-18 PROCEDURE — 25010000002 HEPARIN (PORCINE) PER 1000 UNITS: Performed by: SURGERY

## 2021-10-18 DEVICE — PTCH VASC XENOSURE BIO 0.8X8CM: Type: IMPLANTABLE DEVICE | Site: CAROTID | Status: FUNCTIONAL

## 2021-10-18 RX ORDER — BUPIVACAINE HCL/0.9 % NACL/PF 0.1 %
2 PLASTIC BAG, INJECTION (ML) EPIDURAL EVERY 8 HOURS
Status: COMPLETED | OUTPATIENT
Start: 2021-10-18 | End: 2021-10-19

## 2021-10-18 RX ORDER — SODIUM CHLORIDE 0.9 % (FLUSH) 0.9 %
3-10 SYRINGE (ML) INJECTION AS NEEDED
Status: DISCONTINUED | OUTPATIENT
Start: 2021-10-18 | End: 2021-10-18 | Stop reason: HOSPADM

## 2021-10-18 RX ORDER — SODIUM CHLORIDE 0.9 % (FLUSH) 0.9 %
10 SYRINGE (ML) INJECTION AS NEEDED
Status: DISCONTINUED | OUTPATIENT
Start: 2021-10-18 | End: 2021-10-19 | Stop reason: HOSPADM

## 2021-10-18 RX ORDER — PROTAMINE SULFATE 10 MG/ML
INJECTION, SOLUTION INTRAVENOUS AS NEEDED
Status: DISCONTINUED | OUTPATIENT
Start: 2021-10-18 | End: 2021-10-18 | Stop reason: SURG

## 2021-10-18 RX ORDER — PREDNISONE 1 MG/1
5 TABLET ORAL TAKE AS DIRECTED
Status: DISCONTINUED | OUTPATIENT
Start: 2021-10-18 | End: 2021-10-19 | Stop reason: HOSPADM

## 2021-10-18 RX ORDER — FLUMAZENIL 0.1 MG/ML
0.2 INJECTION INTRAVENOUS AS NEEDED
Status: DISCONTINUED | OUTPATIENT
Start: 2021-10-18 | End: 2021-10-18 | Stop reason: HOSPADM

## 2021-10-18 RX ORDER — TRIAMCINOLONE ACETONIDE 1 MG/G
CREAM TOPICAL DAILY PRN
Status: DISCONTINUED | OUTPATIENT
Start: 2021-10-18 | End: 2021-10-19 | Stop reason: HOSPADM

## 2021-10-18 RX ORDER — ONDANSETRON 2 MG/ML
4 INJECTION INTRAMUSCULAR; INTRAVENOUS AS NEEDED
Status: DISCONTINUED | OUTPATIENT
Start: 2021-10-18 | End: 2021-10-18 | Stop reason: HOSPADM

## 2021-10-18 RX ORDER — LIDOCAINE HYDROCHLORIDE 20 MG/ML
INJECTION, SOLUTION EPIDURAL; INFILTRATION; INTRACAUDAL; PERINEURAL AS NEEDED
Status: DISCONTINUED | OUTPATIENT
Start: 2021-10-18 | End: 2021-10-18 | Stop reason: SURG

## 2021-10-18 RX ORDER — BUPIVACAINE HYDROCHLORIDE 5 MG/ML
INJECTION, SOLUTION PERINEURAL AS NEEDED
Status: DISCONTINUED | OUTPATIENT
Start: 2021-10-18 | End: 2021-10-18 | Stop reason: HOSPADM

## 2021-10-18 RX ORDER — SODIUM CHLORIDE, SODIUM LACTATE, POTASSIUM CHLORIDE, CALCIUM CHLORIDE 600; 310; 30; 20 MG/100ML; MG/100ML; MG/100ML; MG/100ML
1000 INJECTION, SOLUTION INTRAVENOUS CONTINUOUS
Status: DISCONTINUED | OUTPATIENT
Start: 2021-10-18 | End: 2021-10-18 | Stop reason: HOSPADM

## 2021-10-18 RX ORDER — LABETALOL HYDROCHLORIDE 5 MG/ML
5 INJECTION, SOLUTION INTRAVENOUS
Status: DISCONTINUED | OUTPATIENT
Start: 2021-10-18 | End: 2021-10-18 | Stop reason: HOSPADM

## 2021-10-18 RX ORDER — SODIUM CHLORIDE 0.9 % (FLUSH) 0.9 %
3 SYRINGE (ML) INJECTION EVERY 12 HOURS SCHEDULED
Status: DISCONTINUED | OUTPATIENT
Start: 2021-10-18 | End: 2021-10-18 | Stop reason: HOSPADM

## 2021-10-18 RX ORDER — LIDOCAINE HYDROCHLORIDE 40 MG/ML
SOLUTION TOPICAL AS NEEDED
Status: DISCONTINUED | OUTPATIENT
Start: 2021-10-18 | End: 2021-10-18 | Stop reason: SURG

## 2021-10-18 RX ORDER — ONDANSETRON 2 MG/ML
INJECTION INTRAMUSCULAR; INTRAVENOUS AS NEEDED
Status: DISCONTINUED | OUTPATIENT
Start: 2021-10-18 | End: 2021-10-18 | Stop reason: SURG

## 2021-10-18 RX ORDER — ONDANSETRON 4 MG/1
4 TABLET, FILM COATED ORAL EVERY 6 HOURS PRN
Status: DISCONTINUED | OUTPATIENT
Start: 2021-10-18 | End: 2021-10-19 | Stop reason: HOSPADM

## 2021-10-18 RX ORDER — PANTOPRAZOLE SODIUM 40 MG/1
40 TABLET, DELAYED RELEASE ORAL DAILY
Status: DISCONTINUED | OUTPATIENT
Start: 2021-10-18 | End: 2021-10-19 | Stop reason: HOSPADM

## 2021-10-18 RX ORDER — TAMSULOSIN HYDROCHLORIDE 0.4 MG/1
0.4 CAPSULE ORAL DAILY
Status: DISCONTINUED | OUTPATIENT
Start: 2021-10-18 | End: 2021-10-18

## 2021-10-18 RX ORDER — HYDRALAZINE HYDROCHLORIDE 50 MG/1
50 TABLET, FILM COATED ORAL EVERY 12 HOURS SCHEDULED
Status: DISCONTINUED | OUTPATIENT
Start: 2021-10-18 | End: 2021-10-19 | Stop reason: HOSPADM

## 2021-10-18 RX ORDER — SODIUM CHLORIDE, SODIUM LACTATE, POTASSIUM CHLORIDE, CALCIUM CHLORIDE 600; 310; 30; 20 MG/100ML; MG/100ML; MG/100ML; MG/100ML
100 INJECTION, SOLUTION INTRAVENOUS CONTINUOUS
Status: DISCONTINUED | OUTPATIENT
Start: 2021-10-18 | End: 2021-10-18 | Stop reason: HOSPADM

## 2021-10-18 RX ORDER — SODIUM CHLORIDE 0.9 % (FLUSH) 0.9 %
10 SYRINGE (ML) INJECTION AS NEEDED
Status: DISCONTINUED | OUTPATIENT
Start: 2021-10-18 | End: 2021-10-18 | Stop reason: HOSPADM

## 2021-10-18 RX ORDER — HYDROCODONE BITARTRATE AND ACETAMINOPHEN 5; 325 MG/1; MG/1
1 TABLET ORAL EVERY 4 HOURS PRN
Status: DISCONTINUED | OUTPATIENT
Start: 2021-10-18 | End: 2021-10-19 | Stop reason: HOSPADM

## 2021-10-18 RX ORDER — ROCURONIUM BROMIDE 10 MG/ML
INJECTION, SOLUTION INTRAVENOUS AS NEEDED
Status: DISCONTINUED | OUTPATIENT
Start: 2021-10-18 | End: 2021-10-18 | Stop reason: SURG

## 2021-10-18 RX ORDER — SODIUM CHLORIDE 0.9 % (FLUSH) 0.9 %
3 SYRINGE (ML) INJECTION AS NEEDED
Status: DISCONTINUED | OUTPATIENT
Start: 2021-10-18 | End: 2021-10-18 | Stop reason: HOSPADM

## 2021-10-18 RX ORDER — ACETAMINOPHEN 325 MG/1
650 TABLET ORAL EVERY 4 HOURS PRN
Status: DISCONTINUED | OUTPATIENT
Start: 2021-10-18 | End: 2021-10-19 | Stop reason: HOSPADM

## 2021-10-18 RX ORDER — DIAZEPAM 5 MG/1
5 TABLET ORAL NIGHTLY
Status: DISCONTINUED | OUTPATIENT
Start: 2021-10-18 | End: 2021-10-19 | Stop reason: HOSPADM

## 2021-10-18 RX ORDER — CLOPIDOGREL BISULFATE 75 MG/1
75 TABLET ORAL EVERY OTHER DAY
Status: DISCONTINUED | OUTPATIENT
Start: 2021-10-19 | End: 2021-10-19 | Stop reason: HOSPADM

## 2021-10-18 RX ORDER — HEPARIN SODIUM 1000 [USP'U]/ML
INJECTION, SOLUTION INTRAVENOUS; SUBCUTANEOUS AS NEEDED
Status: DISCONTINUED | OUTPATIENT
Start: 2021-10-18 | End: 2021-10-18 | Stop reason: SURG

## 2021-10-18 RX ORDER — DEXAMETHASONE SODIUM PHOSPHATE 4 MG/ML
INJECTION, SOLUTION INTRA-ARTICULAR; INTRALESIONAL; INTRAMUSCULAR; INTRAVENOUS; SOFT TISSUE AS NEEDED
Status: DISCONTINUED | OUTPATIENT
Start: 2021-10-18 | End: 2021-10-18 | Stop reason: SURG

## 2021-10-18 RX ORDER — PROPOFOL 10 MG/ML
VIAL (ML) INTRAVENOUS AS NEEDED
Status: DISCONTINUED | OUTPATIENT
Start: 2021-10-18 | End: 2021-10-18 | Stop reason: SURG

## 2021-10-18 RX ORDER — LIDOCAINE HYDROCHLORIDE 10 MG/ML
0.5 INJECTION, SOLUTION EPIDURAL; INFILTRATION; INTRACAUDAL; PERINEURAL ONCE AS NEEDED
Status: DISCONTINUED | OUTPATIENT
Start: 2021-10-18 | End: 2021-10-18 | Stop reason: HOSPADM

## 2021-10-18 RX ORDER — FENTANYL CITRATE 50 UG/ML
INJECTION, SOLUTION INTRAMUSCULAR; INTRAVENOUS AS NEEDED
Status: DISCONTINUED | OUTPATIENT
Start: 2021-10-18 | End: 2021-10-18 | Stop reason: SURG

## 2021-10-18 RX ORDER — SODIUM CHLORIDE 9 MG/ML
50 INJECTION, SOLUTION INTRAVENOUS CONTINUOUS
Status: DISCONTINUED | OUTPATIENT
Start: 2021-10-18 | End: 2021-10-19 | Stop reason: HOSPADM

## 2021-10-18 RX ORDER — FENTANYL CITRATE 50 UG/ML
25 INJECTION, SOLUTION INTRAMUSCULAR; INTRAVENOUS
Status: DISCONTINUED | OUTPATIENT
Start: 2021-10-18 | End: 2021-10-18 | Stop reason: HOSPADM

## 2021-10-18 RX ORDER — NALOXONE HCL 0.4 MG/ML
0.4 VIAL (ML) INJECTION
Status: DISCONTINUED | OUTPATIENT
Start: 2021-10-18 | End: 2021-10-19 | Stop reason: HOSPADM

## 2021-10-18 RX ORDER — ATORVASTATIN CALCIUM 10 MG/1
10 TABLET, FILM COATED ORAL NIGHTLY
Status: DISCONTINUED | OUTPATIENT
Start: 2021-10-18 | End: 2021-10-19 | Stop reason: HOSPADM

## 2021-10-18 RX ORDER — BUPIVACAINE HCL/0.9 % NACL/PF 0.1 %
2 PLASTIC BAG, INJECTION (ML) EPIDURAL ONCE
Status: COMPLETED | OUTPATIENT
Start: 2021-10-18 | End: 2021-10-18

## 2021-10-18 RX ORDER — ACETAMINOPHEN 500 MG
1000 TABLET ORAL ONCE
Status: DISCONTINUED | OUTPATIENT
Start: 2021-10-18 | End: 2021-10-18 | Stop reason: HOSPADM

## 2021-10-18 RX ORDER — NITROGLYCERIN 0.4 MG/1
0.4 TABLET SUBLINGUAL
Status: DISCONTINUED | OUTPATIENT
Start: 2021-10-18 | End: 2021-10-19 | Stop reason: HOSPADM

## 2021-10-18 RX ORDER — ISOSORBIDE MONONITRATE 60 MG/1
60 TABLET, EXTENDED RELEASE ORAL DAILY
Status: DISCONTINUED | OUTPATIENT
Start: 2021-10-19 | End: 2021-10-19 | Stop reason: HOSPADM

## 2021-10-18 RX ORDER — ONDANSETRON 2 MG/ML
4 INJECTION INTRAMUSCULAR; INTRAVENOUS EVERY 6 HOURS PRN
Status: DISCONTINUED | OUTPATIENT
Start: 2021-10-18 | End: 2021-10-19 | Stop reason: HOSPADM

## 2021-10-18 RX ORDER — TAMSULOSIN HYDROCHLORIDE 0.4 MG/1
0.4 CAPSULE ORAL NIGHTLY
Status: DISCONTINUED | OUTPATIENT
Start: 2021-10-18 | End: 2021-10-19 | Stop reason: HOSPADM

## 2021-10-18 RX ORDER — NICARDIPINE HYDROCHLORIDE 2.5 MG/ML
INJECTION INTRAVENOUS AS NEEDED
Status: DISCONTINUED | OUTPATIENT
Start: 2021-10-18 | End: 2021-10-18 | Stop reason: SURG

## 2021-10-18 RX ORDER — LIDOCAINE HYDROCHLORIDE 10 MG/ML
INJECTION, SOLUTION INFILTRATION; PERINEURAL AS NEEDED
Status: DISCONTINUED | OUTPATIENT
Start: 2021-10-18 | End: 2021-10-18 | Stop reason: HOSPADM

## 2021-10-18 RX ORDER — OXYCODONE AND ACETAMINOPHEN 10; 325 MG/1; MG/1
1 TABLET ORAL ONCE AS NEEDED
Status: DISCONTINUED | OUTPATIENT
Start: 2021-10-18 | End: 2021-10-18 | Stop reason: HOSPADM

## 2021-10-18 RX ORDER — HYDROMORPHONE HYDROCHLORIDE 1 MG/ML
0.5 INJECTION, SOLUTION INTRAMUSCULAR; INTRAVENOUS; SUBCUTANEOUS
Status: DISCONTINUED | OUTPATIENT
Start: 2021-10-18 | End: 2021-10-18 | Stop reason: HOSPADM

## 2021-10-18 RX ORDER — AMLODIPINE BESYLATE 10 MG/1
10 TABLET ORAL DAILY
Status: DISCONTINUED | OUTPATIENT
Start: 2021-10-19 | End: 2021-10-19 | Stop reason: HOSPADM

## 2021-10-18 RX ORDER — SODIUM CHLORIDE 0.9 % (FLUSH) 0.9 %
3 SYRINGE (ML) INJECTION EVERY 12 HOURS SCHEDULED
Status: DISCONTINUED | OUTPATIENT
Start: 2021-10-18 | End: 2021-10-19 | Stop reason: HOSPADM

## 2021-10-18 RX ORDER — CITALOPRAM 20 MG/1
20 TABLET ORAL DAILY
Status: DISCONTINUED | OUTPATIENT
Start: 2021-10-18 | End: 2021-10-19 | Stop reason: HOSPADM

## 2021-10-18 RX ORDER — NALOXONE HCL 0.4 MG/ML
0.04 VIAL (ML) INJECTION AS NEEDED
Status: DISCONTINUED | OUTPATIENT
Start: 2021-10-18 | End: 2021-10-18 | Stop reason: HOSPADM

## 2021-10-18 RX ADMIN — TAMSULOSIN HYDROCHLORIDE 0.4 MG: 0.4 CAPSULE ORAL at 21:50

## 2021-10-18 RX ADMIN — CEFAZOLIN 2 G: 10 INJECTION, POWDER, FOR SOLUTION INTRAVENOUS at 18:43

## 2021-10-18 RX ADMIN — DIAZEPAM 5 MG: 5 TABLET ORAL at 21:49

## 2021-10-18 RX ADMIN — Medication 2 G: at 10:29

## 2021-10-18 RX ADMIN — SODIUM CHLORIDE, POTASSIUM CHLORIDE, SODIUM LACTATE AND CALCIUM CHLORIDE: 600; 310; 30; 20 INJECTION, SOLUTION INTRAVENOUS at 11:46

## 2021-10-18 RX ADMIN — Medication 3 ML: at 21:50

## 2021-10-18 RX ADMIN — ATORVASTATIN CALCIUM 10 MG: 10 TABLET, FILM COATED ORAL at 21:50

## 2021-10-18 RX ADMIN — SUGAMMADEX 200 MG: 100 INJECTION, SOLUTION INTRAVENOUS at 11:52

## 2021-10-18 RX ADMIN — PROPOFOL 100 MG: 10 INJECTION, EMULSION INTRAVENOUS at 10:25

## 2021-10-18 RX ADMIN — HEPARIN SODIUM 7000 UNITS: 1000 INJECTION, SOLUTION INTRAVENOUS; SUBCUTANEOUS at 10:56

## 2021-10-18 RX ADMIN — HYDRALAZINE HYDROCHLORIDE 50 MG: 50 TABLET ORAL at 21:50

## 2021-10-18 RX ADMIN — NICARDIPINE HYDROCHLORIDE 500 MCG: 25 INJECTION INTRAVENOUS at 11:37

## 2021-10-18 RX ADMIN — VASOPRESSIN 1 ML: 20 INJECTION INTRAVENOUS at 11:00

## 2021-10-18 RX ADMIN — LIDOCAINE HYDROCHLORIDE 1 EACH: 40 SOLUTION TOPICAL at 10:25

## 2021-10-18 RX ADMIN — LIDOCAINE HYDROCHLORIDE 100 MG: 20 INJECTION, SOLUTION EPIDURAL; INFILTRATION; INTRACAUDAL; PERINEURAL at 10:25

## 2021-10-18 RX ADMIN — VASOPRESSIN 1 ML: 20 INJECTION INTRAVENOUS at 10:53

## 2021-10-18 RX ADMIN — FENTANYL CITRATE 100 MCG: 50 INJECTION, SOLUTION INTRAMUSCULAR; INTRAVENOUS at 10:25

## 2021-10-18 RX ADMIN — NICARDIPINE HYDROCHLORIDE 500 MCG: 25 INJECTION INTRAVENOUS at 11:46

## 2021-10-18 RX ADMIN — PROTAMINE SULFATE 10 MG: 10 INJECTION, SOLUTION INTRAVENOUS at 11:46

## 2021-10-18 RX ADMIN — ONDANSETRON 4 MG: 2 INJECTION INTRAMUSCULAR; INTRAVENOUS at 11:28

## 2021-10-18 RX ADMIN — NICARDIPINE HYDROCHLORIDE 500 MCG: 25 INJECTION INTRAVENOUS at 11:40

## 2021-10-18 RX ADMIN — LIDOCAINE HYDROCHLORIDE 100 MG: 20 INJECTION, SOLUTION EPIDURAL; INFILTRATION; INTRACAUDAL; PERINEURAL at 11:57

## 2021-10-18 RX ADMIN — SODIUM CHLORIDE 50 ML/HR: 9 INJECTION, SOLUTION INTRAVENOUS at 22:09

## 2021-10-18 RX ADMIN — ROCURONIUM BROMIDE 40 MG: 50 INJECTION INTRAVENOUS at 10:25

## 2021-10-18 RX ADMIN — ROCURONIUM BROMIDE 20 MG: 50 INJECTION INTRAVENOUS at 11:33

## 2021-10-18 RX ADMIN — DEXAMETHASONE SODIUM PHOSPHATE 4 MG: 4 INJECTION, SOLUTION INTRA-ARTICULAR; INTRALESIONAL; INTRAMUSCULAR; INTRAVENOUS; SOFT TISSUE at 11:28

## 2021-10-18 RX ADMIN — PHENYLEPHRINE HYDROCHLORIDE 0.2 MCG/KG/MIN: 10 INJECTION INTRAVENOUS at 10:31

## 2021-10-18 RX ADMIN — SODIUM CHLORIDE, POTASSIUM CHLORIDE, SODIUM LACTATE AND CALCIUM CHLORIDE 1000 ML: 600; 310; 30; 20 INJECTION, SOLUTION INTRAVENOUS at 08:52

## 2021-10-18 NOTE — ANESTHESIA PROCEDURE NOTES
Airway  Urgency: elective    Date/Time: 10/18/2021 10:26 AM  Airway not difficult    General Information and Staff    Patient location during procedure: OR  CRNA: Rita Cruz CRNA    Indications and Patient Condition  Indications for airway management: airway protection    Preoxygenated: yes  Mask difficulty assessment: 1 - vent by mask    Final Airway Details  Final airway type: endotracheal airway      Successful airway: ETT  Cuffed: yes   Successful intubation technique: direct laryngoscopy  Facilitating devices/methods: intubating stylet  Endotracheal tube insertion site: oral  Blade: Watts  Blade size: 2  ETT size (mm): 7.5  Cormack-Lehane Classification: grade I - full view of glottis  Placement verified by: capnometry   Cuff volume (mL): 5  Measured from: teeth  ETT/EBT  to teeth (cm): 23  Number of attempts at approach: 1  Assessment: lips, teeth, and gum same as pre-op and atraumatic intubation

## 2021-10-18 NOTE — OP NOTE
Marika Gutierrez  10/18/2021     PREOPERATIVE DIAGNOSIS: Stenosis of right carotid artery [I65.21]  Preop testing [Z01.818]     POSTOPERATIVE DIAGNOSIS: Post-Op Diagnosis Codes:     * Stenosis of right carotid artery [I65.21]     * Preop testing [Z01.818]     PROCEDURE PERFORMED:   1.  Right carotid endarterectomy with bovine patch angioplasty  2.  Continuous electroencephalographic monitoring  3.  Completion arterial duplex     SURGEON: Puneet Chang DO   Assistant: Jameel Barboza MD     ANESTHESIA: General.    PREPARATION: Routine.    STAFF: Circulator: Rita Quezada RN; Erin Cintron RN  Scrub Person: Antonino Wren; Lily Taveras  Vendor Representative: Colton Underwood    Estimated Blood Loss: 100ml    SPECIMENS: Carotid plaque    COMPLICATIONS: None    INDICATIONS: Marika Gutierrez is a 75 y.o. male was referred for carotid disease by Dr. Hoskins.  He is maintained on Plavix and Lipitor.  He did undergo a left carotid endarterectomy on 8/18/2021.  His left neck incision has healed.   He is here today for elective right carotid endarterectomy for stroke risk reduction.  The indications, risks, and possible complications of the procedure were explained to the patient, who voiced understanding and wished to proceed with surgery.     PROCEDURE IN DETAIL:   The patient was taken to the operating room and placed on the operating table in a supine position. After general anesthesia was obtained, the right neck and chest was prepped and draped in a sterile manner.  A longitudinal incision was then made along the anterior border of the sternocleidomastoid muscle.  Careful dissection was made down through the subcutaneous tissues using the Bovie cautery to ensure hemostasis.  Any crossing veins were ligated with 3-0 silk suture and hemoclips.  The sternocleidomastoid muscle was retracted laterally.  The carotid sheath was entered over the common carotid artery.  The Metzenbaum scissors were used to free up the  common carotid artery from its local attachments.  A large vessel loop was placed for proximal vascular control.  Dissection was then carried out distally along the carotid artery encountering the facial vein which was ligated with 2-0 silk suture.  This gave rise to the bifurcation.  The superior thyroid artery was identified and encircled with a 2-0 silk suture for vascular control.  The external carotid artery was dissected free and a large vessel loop was placed for vascular control.  The very distal internal carotid artery was carefully dissected free and a small vessel loop was placed for vascular control.  At this point full exposure was established.  The ansa cervicalis, hypoglossal, vagus nerves were all identified.  The patient was given 7000 units of intravenous heparin.  After 3 minutes the distal internal carotid artery was test clamped.  After 2 minutes there were no EEG monitor changes.  The common and external were then clamped in succession.  Using an 11 blade an arteriotomy was made in the common carotid artery.  It was extended up along the distal internal carotid artery with the Loving scissors.  There was a significant amount of heavy plaque burden in the proximal internal carotid artery.  Using the Platinum elevator the standard endarterectomy was performed removing all the plaque burden.  Heparinized saline was used to irrigate the wound bed and all loose debris was picked clean.  The bovine pericardial patch was brought to the field and starting distally on the internal carotid artery the patch anastomosis was performed with a 6-0 Prolene in a running fashion.  Both sutures were brought down to the midline.  The patch was then appropriately fashioned proximally.  Starting with 5-0 Prolene the patch anastomosis was continued in a running fashion to meet in the midline.  Prior to completion of the patch anastomosis the appropriate flushing maneuvers were performed and anastomosis was completed.   Flow was reestablished.  Hemostasis was observed.  A completion arterial duplex was performed which showed adequate flow velocities in the common, internal, and external carotid arteries.  There was no evidence of flap formation, debris, or thrombosis.  At this point I felt the result was excellent and no further intervention was warranted.  A separate stab incision was made on the inferior part of the neck and a 15 Iraqi round GIOVANNI drain was placed.  It was secured to skin with a 2-0 nylon.  Gelfoam with thrombin was used to ensure hemostasis.  The patient was given 10 mg of protamine intravenously.  Antibiotic saline was used to irrigate the wound bed.  The platysma muscle was then reapproximated using a 3-0 Vicryl in a running fashion.  The skin was then anesthetized using 18 mL of 0.5% Marcaine plain.  The skin was then reapproximated using a 4-0 Monocryl in a subcuticular fashion.  The wound was then cleaned.   Sterile dressings were applied. The patient tolerated the procedure well. Sponge and needle counts were correct. The patient was then awakened and extubated in the operating room and taken to the recovery room in good condition. The patient awoke from anesthesia neurologically intact and there were no EEG monitor changes throughout.  Dr. Barboza was present and assisted in the vital parts of the procedure which included carotid exposure, carotid endarterectomy and bovine patch angioplasty, and primary closure.     Puneet Chang,   Date: 10/18/2021 Time: 11:57 CDT    CC:Abilio Martinez MD

## 2021-10-18 NOTE — H&P
10/17/2021         Abilio Martinez MD  [address] [facility] [address]     Marika Gutierrez  1945          Chief Complaint   Patient presents with   • Follow-up       2 Week Post-Op Follow Up For LEFT CAROTID ENDARTERECTOMY WITH EEG. Patient denies any stroke like symptoms.    • Former Smoker       Patient is a Former Smoker - Quit 2004   • Med Management       Verbally verified medications with patient          Dear Abilio Martinez MD     HPI  I had the pleasure of seeing your patient Marika Gutierrez in the office today.    As you recall, Marika Gutierrez is a 75 y.o.  male who you are currently following for routine health maintenance.  He was referred for carotid disease by Dr. Hoskins.  He is maintained on Plavix and Lipitor.  He did undergo a left carotid endarterectomy on 8/18/2021.  His left neck incision has healed.     Past Medical History:   Diagnosis Date   • Aortic valve regurgitation    • Cancer (HCC)    • Carotid stenosis    • Cataract     right   • COPD (chronic obstructive pulmonary disease) (HCC)    • Coronary artery disease    • GERD (gastroesophageal reflux disease)    • Hard of hearing    • Hyperlipidemia    • Hypertension    • PONV (postoperative nausea and vomiting)    • Renal cell carcinoma (HCC)    • TIA (transient ischemic attack)      Past Surgical History:   Procedure Laterality Date   • BACK SURGERY     • CARDIAC CATHETERIZATION      X 4 - 2010 - 2005 - 2003 - 1996   • CAROTID ENDARTERECTOMY Left 8/18/2021    Procedure: LEFT CAROTID ENDARTERECTOMY WITH EEG;  Surgeon: Puneet Chang DO;  Location: [place] [address];  Service: Vascular;  Laterality: Left;   • CORONARY ARTERY BYPASS GRAFT      X 4   • EXTERNAL EAR SURGERY     • NEPHRECTOMY      LEFT - 2017     No family history on file.  Social History     Tobacco Use   • Smoking status: Former Smoker     Packs/day: 1.00     Years: 25.00     Pack years: 25.00     Types: Cigarettes     Quit date: 2004      "Years since quittin.8   • Smokeless tobacco: Former User     Types: Chew   Vaping Use   • Vaping Use: Never used   Substance Use Topics   • Alcohol use: Not Currently   • Drug use: Not Currently     Allergies   Allergen Reactions   • Cyclobenzaprine Headache     Severe headache     • Hydrocodone-Acetaminophen Nausea And Vomiting   • Pentazocine Palpitations     Current Outpatient Medications   Medication Instructions   • alfuzosin (UROXATRAL) 10 mg, Oral, Daily   • amLODIPine (NORVASC) 10 mg, Oral, Daily   • atorvastatin (LIPITOR) 10 mg, Oral, Daily   • citalopram (CELEXA) 20 mg, Oral, Daily   • clopidogrel (PLAVIX) 75 mg, Oral, Every Other Day   • diazePAM (VALIUM) 5 mg, Oral, Nightly   • hydrALAZINE (APRESOLINE) 50 mg, Oral, 2 times daily   • isosorbide mononitrate (IMDUR) 60 mg, Oral, Daily   • nitroglycerin (NITROSTAT) 0.4 mg, Sublingual, Every 5 Minutes PRN   • pantoprazole (PROTONIX) 40 mg, Oral, Daily   • predniSONE (DELTASONE) 5 mg, Oral, Take As Directed, Every other week, currently off of   • tamsulosin (FLOMAX) 0.4 MG capsule 24 hr capsule 1 capsule, Oral, Daily   • triamcinolone (KENALOG) 0.1 % cream Daily PRN          Review of Systems   Constitutional: Negative.    HENT: Negative.    Eyes: Negative.    Respiratory: Positive for shortness of breath.    Cardiovascular: Negative.    Gastrointestinal: Negative.    Endocrine: Negative.    Genitourinary: Negative.    Musculoskeletal: Negative.    Skin: Negative.    Allergic/Immunologic: Negative.    Neurological: Negative.    Hematological: Negative.    Psychiatric/Behavioral: Negative.    All other systems reviewed and are negative.     /74 (BP Location: Left arm, Patient Position: Sitting, Cuff Size: Adult)   Pulse 63   Ht 172.7 cm (68\")   Wt 90.7 kg (200 lb)   SpO2 98%   BMI 30.41 kg/m²   Physical Exam  Vitals and nursing note reviewed.   Constitutional:       General: He is not in acute distress.     Appearance: Normal appearance. He is " well-developed. He is obese. He is not diaphoretic.   HENT:      Head: Normocephalic and atraumatic.   Neck:      Vascular: No carotid bruit or JVD.      Comments: Left neck incision healed  Cardiovascular:      Rate and Rhythm: Normal rate and regular rhythm.      Pulses: Normal pulses.           Femoral pulses are 2+ on the right side and 2+ on the left side.       Popliteal pulses are 2+ on the right side and 2+ on the left side.        Dorsalis pedis pulses are 2+ on the right side and 2+ on the left side.        Posterior tibial pulses are 2+ on the right side and 2+ on the left side.      Heart sounds: S1 normal and S2 normal. Murmur heard.   No friction rub. No gallop.    Pulmonary:      Effort: Pulmonary effort is normal.      Breath sounds: Normal breath sounds.   Abdominal:      General: Bowel sounds are normal. There is no abdominal bruit.      Palpations: Abdomen is soft.      Tenderness: There is no abdominal tenderness.   Musculoskeletal:         General: Normal range of motion.      Cervical back: Neck supple.   Skin:     General: Skin is warm and dry.   Neurological:      General: No focal deficit present.      Mental Status: He is alert and oriented to person, place, and time.      Cranial Nerves: No cranial nerve deficit.   Psychiatric:         Mood and Affect: Mood normal.         Behavior: Behavior normal.         Thought Content: Thought content normal.         Judgment: Judgment normal.            Diagnostic Data:  CT ANGIOGRAM CAROTIDS- 6/24/2021 8:57 AM CDT     HISTORY: Carotid artery stenosis; I65.23-Occlusion and stenosis of  bilateral carotid arteries      COMPARISON: None     DOSE LENGTH PRODUCT: 460  mGy cm. Automated exposure control was also  utilized to decrease patient radiation dose.     TECHNIQUE: Axial images the neck are obtained following IV contrast. 2-D  and maximal intensity projection images reconstructed and reviewed     FINDINGS:  Mild to moderate atherosclerotic plaque  within the arch of  the aorta. Coronary artery bypass changes are noted. There is mild  calcification of the left subclavian artery creating less than 15%  stenosis. A normal calcification right brachiocephalic artery with no  significant luminal stenosis. No prominent right subclavian artery  stenosis.     Mild tortuosity of the patent left common carotid artery. There is  moderate densely calcified plaque left carotid bulb extending into the  left proximal internal carotid artery resulting in 68 % stenosis. Left  external carotid artery is patent.     The right common carotid artery is patent. Moderate densely calcified  plaque of the right carotid bulb extending into the right proximal  internal carotid artery resulting in 50% stenosis.     The vertebral arteries originate from the proximal subclavian arteries  as expected. Dominant right vertebral artery. Calcifications of the  distal vertebral arteries resulting in less than 50% stenosis. Prominent  tapering of the left distal vertebral artery. Small caliber basilar  artery.     Calcifications in the cavernous segments of the distal internal carotid  arteries resulting in less than 25% stenosis. Fetal origin of the right  posterior cerebral artery from the anterior circulation.     No pathologic lymphadenopathy or suspicious soft tissue mass identified  within the neck. Mucous secretions at the level of the vallecula.  Emphysematous changes within the visible lung apices. At least moderate  degenerative change of the cervical spine.     IMPRESSION:  1. Images interpreted with NASCET criteria.  2. 70% stenosis of the proximal left internal carotid artery.  3. 50% stenosis of the proximal right internal carotid artery.  4. Dominant right vertebral artery with prominent tapering of the left  distal vertebral artery. Small caliber patent basilar artery.  5. Fetal origin right posterior cerebral artery from the anterior  circulation.  6. Coronary artery bypass  surgical changes.  7. Emphysema.  This report was finalized on 06/24/2021 09:52 by Dr. Cynthia Jaime MD.         Patient Active Problem List   Diagnosis   • Coronary artery disease involving native coronary artery of native heart without angina pectoris   • S/P CABG (coronary artery bypass graft) 1996 [place]    • Mixed hyperlipidemia   • Benign essential HTN   • Family history of early CAD   • Ex-smoker for more than 1 year 1PPD x 40 years quit 16 years ago   • Nonrheumatic aortic valve insufficiency   • Class 1 obesity due to excess calories with serious comorbidity and body mass index (BMI) of 32.0 to 32.9 in adult   • Bilateral carotid artery stenosis   • Preop testing           Diagnosis Plan   1. Bilateral carotid artery stenosis  Ambulatory Referral to ENT (Otolaryngology)   2. Mixed hyperlipidemia      3. Benign essential HTN            Plan: After thoroughly evaluating Marika Gutierrez, I am pleased to report he is doing well status post left carotid endarterectomy.  His left neck incision has healed.  At this point he is free to resume normal activity without restriction.  He does have significant right carotid stenosis with heavy plaque in plaque ulceration.  I have received clearance from ENT.  Risks of carotid endarterectomy include, but are not limited to, bleeding, infection, vessel damage, nerve damage, MI, stroke, and death.  The patient understands these risks and would like to proceed with the procedure. I did discuss vascular risk factors as they pertain to the progression of vascular disease including controlling his hypertension and hyperlipidemia.  His blood pressure stable on his current medication regimen.  He is maintained on Lipitor for his hyperlipidemia. Patient's Body mass index is 30.41 kg/m². indicating that he is obese (BMI >30). Obesity-related health conditions include the following: hypertension, coronary heart disease, dyslipidemias and peripheral vascular disease. Obesity  is unchanged. BMI is is above average; BMI management plan is completed. We discussed portion control and increasing exercise..  The patient is to continue taking their medications as previously discussed.   This was all discussed in full with complete understanding.  Thank you for allowing me to participate in the care of your patient.  Please do not hesitate to call with any questions or concerns.  We will keep you aware of any further encounters with Marika Gutierrez.         Sincerely yours,           Puneet Chang, DO

## 2021-10-18 NOTE — ANESTHESIA PREPROCEDURE EVALUATION
Anesthesia Evaluation     Patient summary reviewed and Nursing notes reviewed   history of anesthetic complications: PONV  NPO Solid Status: > 8 hours  NPO Liquid Status: > 8 hours           Airway   Mallampati: I  TM distance: >3 FB  Neck ROM: full  No difficulty expected  Dental      Pulmonary    (+) COPD,   (-) not a smoker  Cardiovascular   Exercise tolerance: poor (<4 METS)    (+) hypertension, valvular problems/murmurs AI, CAD, CABG, hyperlipidemia,  carotid artery disease    ROS comment: 7/23/21  · Left ventricular ejection fraction is normal. (Calculated EF = 63%).  · Myocardial perfusion imaging indicates a medium-sized infarct located in the lateral wall with no significant ischemia noted.    CT angio neck  IMPRESSION:  1. Images interpreted with NASCET criteria.  2. 70% stenosis of the proximal left internal carotid artery.  3. 50% stenosis of the proximal right internal carotid artery.  4. Dominant right vertebral artery with prominent tapering of the left  distal vertebral artery. Small caliber patent basilar artery.  5. Fetal origin right posterior cerebral artery from the anterior  circulation.  6. Coronary artery bypass surgical changes.  7. Emphysema.    Neuro/Psych  (+) TIA,     (-) seizures  GI/Hepatic/Renal/Endo    (+) obesity,  GERD,  renal disease (renal cell carcinoma s/p nephrectomy) CRI,   (-) liver disease    Musculoskeletal     Abdominal    Substance History      OB/GYN          Other      history of cancer remission                      Anesthesia Plan    ASA 3     general     intravenous induction     Anesthetic plan, all risks, benefits, and alternatives have been provided, discussed and informed consent has been obtained with: patient.  Use of blood products discussed with patient  Consented to blood products.

## 2021-10-18 NOTE — ANESTHESIA PROCEDURE NOTES
Arterial Line    Pre-sedation assessment completed: 10/18/2021 10:14 AM    Patient reassessed immediately prior to procedure    Patient location during procedure: pre-op  Start time: 10/18/2021 10:14 AM  Stop Time:10/18/2021 10:14 AM       Line placed for hemodynamic monitoring and ABGs/Labs/ISTAT.  Performed By   Anesthesiologist: Cristian Porras MD  Preanesthetic Checklist  Completed: patient identified, IV checked, site marked, risks and benefits discussed, surgical consent, monitors and equipment checked, pre-op evaluation and timeout performed  Arterial Line Prep   Sterile Tech: mask, gloves and cap  Prep: ChloraPrep  Patient monitoring: blood pressure monitoring, continuous pulse oximetry and EKG  Arterial Line Procedure   Laterality:left  Location:  radial artery  Catheter size: 20 G   Guidance: ultrasound guided  PROCEDURE NOTE/ULTRASOUND INTERPRETATION.  Using ultrasound guidance the potential vascular sites for insertion of the catheter were visualized to determine the patency of the vessel to be used for vascular access.  After selecting the appropriate site for insertion, the needle was visualized under ultrasound being inserted into the radial artery, followed by ultrasound confirmation of wire and catheter placement. There were no abnormalities seen on ultrasound; an image was taken; and the patient tolerated the procedure with no complications.   Number of attempts: 1  Successful placement: yes  Post Assessment   Dressing Type: occlusive dressing applied, wrist guard applied and secured with tape.   Complications no  Circ/Move/Sens Assessment: normal.   Patient Tolerance: patient tolerated the procedure well with no apparent complications

## 2021-10-18 NOTE — PLAN OF CARE
Goal Outcome Evaluation:  Plan of Care Reviewed With: patient        Progress: no change  Outcome Summary: ivf/ iv abx cont. no neuro deficits noted. pain med offered and pt refused. remains due to void. right neck drsg dry and intact. mirna with sang drainage. cont to monitor.

## 2021-10-19 VITALS
TEMPERATURE: 97.9 F | SYSTOLIC BLOOD PRESSURE: 169 MMHG | OXYGEN SATURATION: 96 % | DIASTOLIC BLOOD PRESSURE: 68 MMHG | RESPIRATION RATE: 18 BRPM | HEART RATE: 79 BPM

## 2021-10-19 PROCEDURE — 99024 POSTOP FOLLOW-UP VISIT: CPT | Performed by: NURSE PRACTITIONER

## 2021-10-19 PROCEDURE — 25010000002 CEFAZOLIN PER 500 MG: Performed by: SURGERY

## 2021-10-19 RX ADMIN — HYDRALAZINE HYDROCHLORIDE 50 MG: 50 TABLET ORAL at 08:56

## 2021-10-19 RX ADMIN — CEFAZOLIN 2 G: 10 INJECTION, POWDER, FOR SOLUTION INTRAVENOUS at 01:14

## 2021-10-19 RX ADMIN — CITALOPRAM 20 MG: 20 TABLET, FILM COATED ORAL at 08:56

## 2021-10-19 RX ADMIN — CLOPIDOGREL 75 MG: 75 TABLET, FILM COATED ORAL at 08:56

## 2021-10-19 RX ADMIN — ISOSORBIDE MONONITRATE 60 MG: 60 TABLET, EXTENDED RELEASE ORAL at 08:56

## 2021-10-19 RX ADMIN — AMLODIPINE BESYLATE 10 MG: 10 TABLET ORAL at 08:56

## 2021-10-19 NOTE — PLAN OF CARE
Goal Outcome Evaluation:  Plan of Care Reviewed With: patient        Progress: no change  Outcome Summary: VSS. IV fluids/abx continue. Voiding. Drsg to R neck dry and intact. GIOVANNI x1. Denies pain. Will continue to monitor.

## 2021-10-19 NOTE — ANESTHESIA POSTPROCEDURE EVALUATION
Patient: Marika Gutierrez    Procedure Summary     Date: 10/18/21 Room / Location: [facility] OR  /  PAD HYBRID OR 12    Anesthesia Start: 1019 Anesthesia Stop: 1208    Procedure: RIGHT CAROTID ENDARTERECTOMY WITH EEG (Right Neck) Diagnosis:       Stenosis of right carotid artery      Preop testing      (Stenosis of right carotid artery [I65.21])      (Preop testing [Z01.818])    Surgeons: Puneet Chang DO Provider: Rita Cruz CRNA    Anesthesia Type: general ASA Status: 3          Anesthesia Type: general    Vitals  Vitals Value Taken Time   /54 10/18/21 1235   Temp 97.8 °F (36.6 °C) 10/18/21 1235   Pulse 78 10/18/21 1235   Resp 14 10/18/21 1235   SpO2 94 % 10/18/21 1235           Post Anesthesia Care and Evaluation    Patient location during evaluation: PACU  Patient participation: complete - patient participated  Level of consciousness: awake and alert  Pain management: adequate  Airway patency: patent  Anesthetic complications: No anesthetic complications    Cardiovascular status: acceptable  Respiratory status: acceptable  Hydration status: acceptable    Comments: Blood pressure 169/68, pulse 79, temperature 97.9 °F (36.6 °C), temperature source Oral, resp. rate 18, SpO2 96 %.    Pt discharged from PACU based on landon score >8

## 2021-10-19 NOTE — DISCHARGE SUMMARY
Date of Discharge:  10/19/2021    Discharge Diagnosis: Stenosis of right carotid artery [I65.21]    Presenting Problem/History of Present Illness  Stenosis of right carotid artery [I65.21]  Preop testing [Z01.818]  Carotid stenosis, right [I65.21]       Hospital Course  Patient is a 75 y.o. male was referred for carotid disease by Dr. Hoskins.  He is maintained on Plavix and Lipitor.  He did undergo a left carotid endarterectomy on 8/18/2021. He is here for elective right carotid endarterectomy.  He did undergo a right carotid endarterectomy without incident.   He was transferred to  for continued care.  Overnight, he has done well.  His vitals have remained stable, right neck soft without hematoma, and he remains neurologically intact.  He is stable and ready for discharge.  We will see him back in 2 weeks for post operative follow up.  I did remove his Erik Montero drain.  His medications will stay the same.  Written and verbal instructions were given.  This all was discussed in full with complete understanding.        Procedures Performed  Procedure(s):  RIGHT CAROTID ENDARTERECTOMY WITH EEG       Consults:   Consults     No orders found for last 30 day(s).            Condition on Discharge:  stable    Discharge Medications     Discharge Medications      Continue These Medications      Instructions Start Date   alfuzosin 10 MG 24 hr tablet  Commonly known as: UROXATRAL   10 mg, Oral, Daily      amLODIPine 10 MG tablet  Commonly known as: NORVASC   10 mg, Oral, Daily      atorvastatin 10 MG tablet  Commonly known as: LIPITOR   10 mg, Oral, Daily      citalopram 20 MG tablet  Commonly known as: CeleXA   20 mg, Oral, Daily      clopidogrel 75 MG tablet  Commonly known as: PLAVIX   75 mg, Oral, Every Other Day      diazePAM 5 MG tablet  Commonly known as: VALIUM   5 mg, Oral, Nightly      hydrALAZINE 50 MG tablet  Commonly known as: APRESOLINE   50 mg, Oral, 2 times daily      isosorbide mononitrate 60 MG 24 hr  tablet  Commonly known as: IMDUR   60 mg, Oral, Daily      nitroglycerin 0.4 MG SL tablet  Commonly known as: NITROSTAT   0.4 mg, Sublingual, Every 5 Minutes PRN      pantoprazole 40 MG EC tablet  Commonly known as: PROTONIX   40 mg, Oral, Daily      predniSONE 5 MG tablet  Commonly known as: DELTASONE   5 mg, Oral, Take As Directed, Every other week, currently off of      tamsulosin 0.4 MG capsule 24 hr capsule  Commonly known as: FLOMAX   1 capsule, Oral, Daily      triamcinolone 0.1 % cream  Commonly known as: KENALOG   Daily PRN             Discharge Diet:   Diet Instructions     Diet: Regular; Thin      Discharge Diet: Regular    Fluid Consistency: Thin          Activity at Discharge:   Activity Instructions     Bathing Restrictions      Type of Restriction: Bathing    Bathing Restrictions: Other    Explain Bathing Restrictions: may shower tomorrow    Driving Restrictions      Type of Restriction: Driving    Driving Restrictions: No Driving (Time Limited)    Length: 1 Week    Lifting Restrictions      Type of Restriction: Lifting    Lifting Restrictions: Lifting Restriction (Indicate Limit)    Weight Limit (Pounds): 10    Length of Lifting Restriction: 1 week    Other Activity Restrictions      Type of Restriction: Other    Explain Other Restrictions: no bending, stooping, or straining    Work Restrictions      Type of Restriction: Work    May Return to Work: After Next Appointment    With / Without Restrictions: Without Restrictions          Follow-up Appointments  Future Appointments   Date Time Provider Department Center   11/30/2021 11:00 AM [facility] ECHO ROOM 2 [facility] CARDI [facility]   12/21/2021  8:30 AM [facility] BIC US 2 [facility] US BI [facility]   12/21/2021 10:40 AM Kodi Burrell MD [facility]   1/26/2022 10:00 AM Pradip Hsokins MD [facility]  [facility]     Additional Instructions for the Follow-ups that You Need to Schedule     Discharge Follow-up with Specialty: Dr. Chang; 2 Weeks   As directed      Specialty: Dr. Chang     Follow Up: 2 Weeks    Follow Up Details: post op right carotid                I did spend more than 30 minutes reviewing the chart, face to face encounter, and organizing discharge.    DONA Méndez  10/19/21  07:45 CDT

## 2021-11-02 ENCOUNTER — TELEPHONE (OUTPATIENT)
Dept: VASCULAR SURGERY | Facility: CLINIC | Age: 76
End: 2021-11-02

## 2021-11-02 NOTE — TELEPHONE ENCOUNTER
Spoke with  Matt reminding him of his appointment for Wednesday, November 3rd, 2021 at 1130 am with Faith CARCAMO. Mr scanlon confirmed he would be here.

## 2021-11-03 ENCOUNTER — OFFICE VISIT (OUTPATIENT)
Dept: VASCULAR SURGERY | Facility: CLINIC | Age: 76
End: 2021-11-03

## 2021-11-03 VITALS
HEART RATE: 77 BPM | OXYGEN SATURATION: 94 % | BODY MASS INDEX: 31.1 KG/M2 | SYSTOLIC BLOOD PRESSURE: 144 MMHG | RESPIRATION RATE: 18 BRPM | DIASTOLIC BLOOD PRESSURE: 80 MMHG | HEIGHT: 69 IN | WEIGHT: 210 LBS

## 2021-11-03 DIAGNOSIS — I10 BENIGN ESSENTIAL HTN: ICD-10-CM

## 2021-11-03 DIAGNOSIS — E78.2 MIXED HYPERLIPIDEMIA: ICD-10-CM

## 2021-11-03 DIAGNOSIS — I65.23 BILATERAL CAROTID ARTERY STENOSIS: Primary | ICD-10-CM

## 2021-11-03 PROBLEM — C64.9 RENAL CELL CARCINOMA (HCC): Status: ACTIVE | Noted: 2018-08-27

## 2021-11-03 PROBLEM — N13.8 BENIGN PROSTATIC HYPERPLASIA WITH URINARY OBSTRUCTION: Status: ACTIVE | Noted: 2018-02-28

## 2021-11-03 PROBLEM — N40.1 BENIGN PROSTATIC HYPERPLASIA WITH URINARY OBSTRUCTION: Status: ACTIVE | Noted: 2018-02-28

## 2021-11-03 PROBLEM — R00.2 PALPITATIONS: Status: ACTIVE | Noted: 2021-11-03

## 2021-11-03 PROCEDURE — 99024 POSTOP FOLLOW-UP VISIT: CPT | Performed by: NURSE PRACTITIONER

## 2021-11-03 NOTE — PROGRESS NOTES
"11/3/2021       Abilio Martinez MD  [address] [facility] [address]    Marika Gutierrez  1945    Chief Complaint   Patient presents with   • Post-op     2 week post-op follow-up for right carotid artery stenosis.   • Non-smoker     Patient is a former smoker   • Med Refill     Verified patient medication       Dear Abilio Martinez MD    HPI  I had the pleasure of seeing your patient Marika Gutierrez in the office today.    As you recall, Marika Gutierrez is a 76 y.o.  male who we are following for carotid occlusive disease.  Currently he is doing well denies any strokelike symptoms.  He did undergo a left carotid endarterectomy on 8/18/2021 and most recently a right carotid endarterectomy on 10/18/2021.  His right neck incision has healed.  He is maintained on Plavix and Lipitor.  He does have a small hematoma noted.  He remains neurologically intact.      Review of Systems   Constitutional: Negative.    HENT: Negative.    Eyes: Negative.    Respiratory: Positive for shortness of breath.    Cardiovascular: Negative.    Gastrointestinal: Negative.    Endocrine: Negative.    Genitourinary: Negative.    Musculoskeletal: Negative.    Skin: Negative.    Allergic/Immunologic: Negative.    Neurological: Negative.    Hematological: Negative.    Psychiatric/Behavioral: Negative.    All other systems reviewed and are negative.    /80 (BP Location: Left arm, Patient Position: Sitting, Cuff Size: Adult)   Pulse 77   Resp 18   Ht 175.3 cm (69\")   Wt 95.3 kg (210 lb)   SpO2 94%   BMI 31.01 kg/m²   Physical Exam  Vitals and nursing note reviewed.   Constitutional:       General: He is not in acute distress.     Appearance: Normal appearance. He is well-developed. He is obese. He is not diaphoretic.   HENT:      Head: Normocephalic and atraumatic.   Neck:      Vascular: No carotid bruit or JVD.      Comments: Right neck incision healed, small hematoma  Cardiovascular:      Rate and " Rhythm: Normal rate and regular rhythm.      Pulses: Normal pulses.           Femoral pulses are 2+ on the right side and 2+ on the left side.       Popliteal pulses are 2+ on the right side and 2+ on the left side.        Dorsalis pedis pulses are 2+ on the right side and 2+ on the left side.        Posterior tibial pulses are 2+ on the right side and 2+ on the left side.      Heart sounds: S1 normal and S2 normal. Murmur heard.   No friction rub. No gallop.    Pulmonary:      Effort: Pulmonary effort is normal.      Breath sounds: Normal breath sounds.   Abdominal:      General: Bowel sounds are normal. There is no abdominal bruit.      Palpations: Abdomen is soft.      Tenderness: There is no abdominal tenderness.   Musculoskeletal:         General: Normal range of motion.      Cervical back: Neck supple.   Skin:     General: Skin is warm and dry.   Neurological:      General: No focal deficit present.      Mental Status: He is alert and oriented to person, place, and time.      Cranial Nerves: No cranial nerve deficit.   Psychiatric:         Mood and Affect: Mood normal.         Behavior: Behavior normal.         Thought Content: Thought content normal.         Judgment: Judgment normal.         Patient Active Problem List   Diagnosis   • Coronary artery disease involving native coronary artery of native heart without angina pectoris   • S/P CABG (coronary artery bypass graft) 1996 [place]    • Mixed hyperlipidemia   • Benign essential HTN   • Family history of early CAD   • Ex-smoker for more than 1 year 1PPD x 40 years quit 16 years ago   • Nonrheumatic aortic valve insufficiency   • Class 1 obesity due to excess calories with serious comorbidity and body mass index (BMI) of 32.0 to 32.9 in adult   • Bilateral carotid artery stenosis   • Preop testing   • Stenosis of right carotid artery   • Carotid stenosis, right   • Abnormal laboratory test   • Arteriosclerosis of arterial coronary artery bypass graft    • Arthralgia   • Benign prostatic hyperplasia with urinary obstruction   • Cardiovascular system problem   • Carotid artery occlusion   • DDD (degenerative disc disease), lumbosacral   • Inflammatory polyarthropathy (HCC)   • Intermittent claudication (HCC)   • Osteoarthritis of knee   • Palpitations   • Renal cell carcinoma (HCC)   • Tendinitis        Diagnosis Plan   1. Bilateral carotid artery stenosis  US Carotid Bilateral   2. Mixed hyperlipidemia     3. Benign essential HTN         Plan: After thoroughly evaluating Marika Gutierrez, I am pleased to report he is doing well status post right carotid endarterectomy.  His right neck incision has healed.  He does have a very small hematoma and reported that he had significant bruising however that has resolved at this time.  He is free to resume normal activity without restriction.  This will resolve with some time.  We will see him back in 6 months with repeat noninvasive testing for continued surveillance, including a carotid duplex.  I did discuss vascular risk factors as it pertain to the progression of vascular disease including controlling his hypertension and hyperlipidemia.  His blood pressure slightly elevated at 144/80.  He is maintained on Lipitor for his hyperlipidemia.  Patient's Body mass index is 31.01 kg/m². indicating that he is obese (BMI >30). Obesity-related health conditions include the following: hypertension, coronary heart disease, dyslipidemias and peripheral vascular disease. Obesity is unchanged. BMI is is above average; BMI management plan is completed. We discussed portion control and increasing exercise..  The patient is to continue taking their medications as previously discussed.   This was all discussed in full with complete understanding.  Thank you for allowing me to participate in the care of your patient.  Please do not hesitate to call with any questions or concerns.  We will keep you aware of any further encounters with  Marika Gutierrez.        Sincerely yours,         Faith Wells, DONA Martinez, Abilio Solis MD

## 2021-11-30 ENCOUNTER — HOSPITAL ENCOUNTER (OUTPATIENT)
Dept: CARDIOLOGY | Facility: HOSPITAL | Age: 76
Discharge: HOME | End: 2021-11-30
Admitting: NURSE PRACTITIONER

## 2021-11-30 VITALS
HEIGHT: 69 IN | BODY MASS INDEX: 31.1 KG/M2 | WEIGHT: 210 LBS | DIASTOLIC BLOOD PRESSURE: 70 MMHG | SYSTOLIC BLOOD PRESSURE: 158 MMHG

## 2021-11-30 DIAGNOSIS — I35.1 NONRHEUMATIC AORTIC VALVE INSUFFICIENCY: ICD-10-CM

## 2021-11-30 PROCEDURE — 93356 MYOCRD STRAIN IMG SPCKL TRCK: CPT | Performed by: INTERNAL MEDICINE

## 2021-11-30 PROCEDURE — 93306 TTE W/DOPPLER COMPLETE: CPT

## 2021-11-30 PROCEDURE — 93306 TTE W/DOPPLER COMPLETE: CPT | Performed by: INTERNAL MEDICINE

## 2021-11-30 PROCEDURE — 93356 MYOCRD STRAIN IMG SPCKL TRCK: CPT

## 2021-12-02 LAB
BH CV ECHO MEAS - AO MAX PG (FULL): 6.9 MMHG
BH CV ECHO MEAS - AO MAX PG: 11.6 MMHG
BH CV ECHO MEAS - AO MEAN PG (FULL): 4 MMHG
BH CV ECHO MEAS - AO MEAN PG: 7 MMHG
BH CV ECHO MEAS - AO ROOT AREA (BSA CORRECTED): 1.6
BH CV ECHO MEAS - AO ROOT AREA: 8.6 CM^2
BH CV ECHO MEAS - AO ROOT DIAM: 3.3 CM
BH CV ECHO MEAS - AO V2 MAX: 170 CM/SEC
BH CV ECHO MEAS - AO V2 MEAN: 120 CM/SEC
BH CV ECHO MEAS - AO V2 VTI: 42.7 CM
BH CV ECHO MEAS - AVA(I,A): 2.7 CM^2
BH CV ECHO MEAS - AVA(I,D): 2.7 CM^2
BH CV ECHO MEAS - AVA(V,A): 2.6 CM^2
BH CV ECHO MEAS - AVA(V,D): 2.6 CM^2
BH CV ECHO MEAS - BSA(HAYCOCK): 2.2 M^2
BH CV ECHO MEAS - BSA: 2.1 M^2
BH CV ECHO MEAS - BZI_BMI: 31 KILOGRAMS/M^2
BH CV ECHO MEAS - BZI_METRIC_HEIGHT: 175.3 CM
BH CV ECHO MEAS - BZI_METRIC_WEIGHT: 95.3 KG
BH CV ECHO MEAS - EDV(CUBED): 119.8 ML
BH CV ECHO MEAS - EDV(MOD-SP4): 110 ML
BH CV ECHO MEAS - EDV(TEICH): 114.4 ML
BH CV ECHO MEAS - EF(CUBED): 79.2 %
BH CV ECHO MEAS - EF(MOD-SP4): 57.4 %
BH CV ECHO MEAS - EF(TEICH): 71.4 %
BH CV ECHO MEAS - ESV(CUBED): 24.9 ML
BH CV ECHO MEAS - ESV(MOD-SP4): 46.9 ML
BH CV ECHO MEAS - ESV(TEICH): 32.8 ML
BH CV ECHO MEAS - FS: 40.8 %
BH CV ECHO MEAS - IVS/LVPW: 1
BH CV ECHO MEAS - IVSD: 1.1 CM
BH CV ECHO MEAS - LA DIMENSION: 3.7 CM
BH CV ECHO MEAS - LA/AO: 1.1
BH CV ECHO MEAS - LAT PEAK E' VEL: 9.7 CM/SEC
BH CV ECHO MEAS - LV DIASTOLIC VOL/BSA (35-75): 52.2 ML/M^2
BH CV ECHO MEAS - LV MASS(C)D: 203.8 GRAMS
BH CV ECHO MEAS - LV MASS(C)DI: 96.6 GRAMS/M^2
BH CV ECHO MEAS - LV MAX PG: 4.7 MMHG
BH CV ECHO MEAS - LV MEAN PG: 3 MMHG
BH CV ECHO MEAS - LV SYSTOLIC VOL/BSA (12-30): 22.2 ML/M^2
BH CV ECHO MEAS - LV V1 MAX: 108 CM/SEC
BH CV ECHO MEAS - LV V1 MEAN: 80.3 CM/SEC
BH CV ECHO MEAS - LV V1 VTI: 27.7 CM
BH CV ECHO MEAS - LVIDD: 4.9 CM
BH CV ECHO MEAS - LVIDS: 2.9 CM
BH CV ECHO MEAS - LVLD AP4: 9 CM
BH CV ECHO MEAS - LVLS AP4: 7.5 CM
BH CV ECHO MEAS - LVOT AREA (M): 4.2 CM^2
BH CV ECHO MEAS - LVOT AREA: 4.2 CM^2
BH CV ECHO MEAS - LVOT DIAM: 2.3 CM
BH CV ECHO MEAS - LVPWD: 1.1 CM
BH CV ECHO MEAS - MED PEAK E' VEL: 5.77 CM/SEC
BH CV ECHO MEAS - MV A MAX VEL: 93.6 CM/SEC
BH CV ECHO MEAS - MV DEC TIME: 0.32 SEC
BH CV ECHO MEAS - MV E MAX VEL: 68.4 CM/SEC
BH CV ECHO MEAS - MV E/A: 0.73
BH CV ECHO MEAS - PA MAX PG: 2.6 MMHG
BH CV ECHO MEAS - PA V2 MAX: 80.5 CM/SEC
BH CV ECHO MEAS - PI END-D VEL: 195 CM/SEC
BH CV ECHO MEAS - RAP SYSTOLE: 5 MMHG
BH CV ECHO MEAS - RVSP: 34.2 MMHG
BH CV ECHO MEAS - SI(AO): 173.2 ML/M^2
BH CV ECHO MEAS - SI(CUBED): 45 ML/M^2
BH CV ECHO MEAS - SI(LVOT): 54.6 ML/M^2
BH CV ECHO MEAS - SI(MOD-SP4): 29.9 ML/M^2
BH CV ECHO MEAS - SI(TEICH): 38.7 ML/M^2
BH CV ECHO MEAS - SV(AO): 365.2 ML
BH CV ECHO MEAS - SV(CUBED): 94.9 ML
BH CV ECHO MEAS - SV(LVOT): 115.1 ML
BH CV ECHO MEAS - SV(MOD-SP4): 63.1 ML
BH CV ECHO MEAS - SV(TEICH): 81.7 ML
BH CV ECHO MEAS - TR MAX VEL: 270 CM/SEC
BH CV ECHO MEASUREMENTS AVERAGE E/E' RATIO: 8.84
LEFT ATRIUM VOLUME INDEX: 36.8 ML/M2
LEFT ATRIUM VOLUME: 77.7 CM3
MAXIMAL PREDICTED HEART RATE: 145 BPM
STRESS TARGET HR: 123 BPM

## 2021-12-10 RX ORDER — ATORVASTATIN CALCIUM 10 MG/1
10 TABLET, FILM COATED ORAL DAILY
Qty: 90 TABLET | Refills: 4 | Status: SHIPPED | OUTPATIENT
Start: 2021-12-10

## 2021-12-21 ENCOUNTER — HOSPITAL ENCOUNTER (OUTPATIENT)
Dept: ULTRASOUND IMAGING | Facility: HOSPITAL | Age: 76
End: 2021-12-21

## 2022-01-13 ENCOUNTER — TELEPHONE (OUTPATIENT)
Dept: CARDIOLOGY | Facility: CLINIC | Age: 77
End: 2022-01-13

## 2022-01-13 NOTE — TELEPHONE ENCOUNTER
Patient is needing cardiac clearance to have a cataract procedure with Dr Cox.    Patient will not need to hold any blood thinners at this time.      Please advise

## 2022-01-19 ENCOUNTER — HOSPITAL ENCOUNTER (OUTPATIENT)
Dept: ULTRASOUND IMAGING | Facility: HOSPITAL | Age: 77
Discharge: HOME | End: 2022-01-19
Admitting: UROLOGY

## 2022-01-19 DIAGNOSIS — C64.2 RENAL CELL CARCINOMA OF LEFT KIDNEY: ICD-10-CM

## 2022-01-19 PROCEDURE — 76775 US EXAM ABDO BACK WALL LIM: CPT

## 2022-01-25 ENCOUNTER — OFFICE VISIT (OUTPATIENT)
Dept: UROLOGY | Facility: CLINIC | Age: 77
End: 2022-01-25

## 2022-01-25 VITALS — WEIGHT: 205.8 LBS | TEMPERATURE: 98.4 F | BODY MASS INDEX: 31.19 KG/M2 | HEIGHT: 68 IN

## 2022-01-25 DIAGNOSIS — C64.2 RENAL CELL CARCINOMA OF LEFT KIDNEY: Primary | ICD-10-CM

## 2022-01-25 PROCEDURE — 99213 OFFICE O/P EST LOW 20 MIN: CPT | Performed by: UROLOGY

## 2022-01-25 RX ORDER — TAMSULOSIN HYDROCHLORIDE 0.4 MG/1
1 CAPSULE ORAL DAILY
Qty: 90 CAPSULE | Refills: 3 | Status: SHIPPED | OUTPATIENT
Start: 2022-01-25 | End: 2023-01-09

## 2022-01-25 NOTE — PROGRESS NOTES
Chief Complaint  Follow-up renal cell carcinoma    Subjective          Marika Gutierrez presents to [facility] for follow-up of renal cell carcinoma.  The patient underwent a left hand-assisted laparoscopic radical nephrectomy in [facility] approximately 4 years ago.Pathology revealed organ confined disease of a 6 cm in greatest dimension.  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  Denies unexplained weight loss, materia, flank or abdominal pain, unusual musculoskeletal pain, cough or hemoptysis  Hx  Incidental left renal mass found when pt had acute diverticulitis on CT  Underwent left hand assisted lap radical nephrectomy (10/2017)- 6x6x5.7 cm clear cell renal cell carcinoma - t1b,n0,m0    Current Outpatient Medications:   •  alfuzosin (UROXATRAL) 10 MG 24 hr tablet, Take 10 mg by mouth Daily., Disp: , Rfl:   •  amLODIPine (NORVASC) 10 MG tablet, Take 10 mg by mouth Daily., Disp: , Rfl:   •  atorvastatin (LIPITOR) 10 MG tablet, Take 1 tablet by mouth Daily., Disp: 90 tablet, Rfl: 4  •  citalopram (CeleXA) 20 MG tablet, Take 20 mg by mouth Daily., Disp: , Rfl:   •  clopidogrel (PLAVIX) 75 MG tablet, Take 1 tablet by mouth Every Other Day., Disp: 90 tablet, Rfl: 1  •  diazePAM (VALIUM) 5 MG tablet, Take 5 mg by mouth Every Night., Disp: , Rfl:   •  hydrALAZINE (APRESOLINE) 50 MG tablet, Take 1 tablet by mouth 2 (two) times a day., Disp: 180 tablet, Rfl: 3  •  isosorbide mononitrate (IMDUR) 60 MG 24 hr tablet, Take 1 tablet by mouth Daily., Disp: 90 tablet, Rfl: 3  •  nitroglycerin (NITROSTAT) 0.4 MG SL tablet, Place 0.4 mg under the tongue Every 5 (Five) Minutes As Needed., Disp: , Rfl:   •  pantoprazole (PROTONIX) 40 MG EC tablet, Take 40 mg by mouth Daily., Disp: , Rfl:   •  predniSONE (DELTASONE) 5 MG tablet, Take 5 mg by mouth Take As Directed. Every other week, currently off of, Disp: , Rfl:   •  tamsulosin (FLOMAX) 0.4 MG capsule 24  "hr capsule, Take 1 capsule by mouth Daily., Disp: 90 capsule, Rfl: 3  •  triamcinolone (KENALOG) 0.1 % cream, Daily As Needed., Disp: , Rfl:   Past Medical History:   Diagnosis Date   • Aortic valve regurgitation    • Cancer (HCC)    • Carotid stenosis    • Cataract     right   • COPD (chronic obstructive pulmonary disease) (HCC)    • Coronary artery disease    • GERD (gastroesophageal reflux disease)    • Hard of hearing    • Hyperlipidemia    • Hypertension    • PONV (postoperative nausea and vomiting)    • Renal cell carcinoma (HCC)    • TIA (transient ischemic attack)      Past Surgical History:   Procedure Laterality Date   • BACK SURGERY     • CARDIAC CATHETERIZATION      X 4 - 2010 - 2005 - 2003 - 1996   • CAROTID ENDARTERECTOMY Left 8/18/2021    Procedure: LEFT CAROTID ENDARTERECTOMY WITH EEG;  Surgeon: Puneet Chang DO;  Location:  [facility] OR [facility];  Service: Vascular;  Laterality: Left;   • CAROTID ENDARTERECTOMY Right 10/18/2021    Procedure: RIGHT CAROTID ENDARTERECTOMY WITH EEG;  Surgeon: Puneet Chang DO;  Location:  [facility];  Service: Vascular;  Laterality: Right;   • CORONARY ARTERY BYPASS GRAFT      X 4   • EXTERNAL EAR SURGERY     • NEPHRECTOMY      LEFT - 2017           Review  of systems  Constitutional: Negative for chills or fever.   Gastrointestinal: Negative for abdominal pain, anal bleeding or blood in stool.           Objective   PHYSICAL EXAM  Vital Signs:   Temp 98.4 °F (36.9 °C)   Ht 172.7 cm (68\")   Wt 93.4 kg (205 lb 12.8 oz)   BMI 31.29 kg/m²     Constitutional: Well nourished, Well developed; No apparent distress; Vitals reviewed as above  Psychiatric: Appropriate affect; Alert and oriented  Eyes: Unremarkable  Musculoskeletal: Normal gait and station  GI: Abdomen is soft, nontender  Respiratory: No distress; Unlabored movement; No accessory musculature needed with symmetric movements  Skin: No pallor or diaphoresis  Lymphatic: No adenopathy neck or " "groin        DATA  Result Review :              US Renal Limited (01/19/2022 09:26)    The images for the above \"link(s)\" were made available to me to review independently.  I also reviewed the radiologist's report described above with regard to the urologic findings. My interpretation is as follows:  There is no evidence of any suspicious lesions.  There is no hydronephrosis.  /Kodi Burrell MD      XR Chest 2 View (08/16/2021 09:19)                ASSESSMENT AND PLAN          Problem List Items Addressed This Visit        Hematology and Neoplasia    Renal cell carcinoma (HCC) - Primary    Relevant Medications    tamsulosin (FLOMAX) 0.4 MG capsule 24 hr capsule      He shows no evidence of metastatic disease.  His CT, chest x-ray and laboratory studies showed nothing suspicious, he will have completed his long-term follow-up for renal cell carcinoma.      AUA guidelines for follow-up  For patients with a history of low-risk (pT1, N0, Mx) renal cell carcinoma that was managed surgically, chest x-rays should be performed annually for 3 years and then only as clinically indicated to assess for pulmonary metastases. Abdominal imaging should be considered annually for three years.         NCCN guidelines for follow-up nephrectomy  In patients who have undergone partial or radical nephrectomy for stage pT1a or pT1b RCC, the NCCN recommends the following:  Complete history and physical examination annually  Laboratory tests annually, as clinically indicated  Abdominal imaging: Baseline abdominal CT or MRI (preferred), or US within 3-12 mo of surgery, then annually for 3 y or longer as clinically indicated; a more rigorous imaging schedule or technique modality can be considered in cases with positive margins or adverse pathologic features (eg, sarcomatoid, high-grade [grade 3/4], positive margins)  Chest imaging: Chest x-ray or CT annually for at least 5 y, then as clinically indicated. A more rigorous imaging schedule " or technique modality can be considered in cases with positive margins or adverse pathologic features [19]      FOLLOW UP     No follow-ups on file.        (Please note that portions of this note were completed with a voice recognition program.)  Kodi Burrell MD  01/25/22  14:37 CST

## 2022-01-26 ENCOUNTER — OFFICE VISIT (OUTPATIENT)
Dept: CARDIOLOGY | Facility: CLINIC | Age: 77
End: 2022-01-26

## 2022-01-26 VITALS
WEIGHT: 210 LBS | DIASTOLIC BLOOD PRESSURE: 60 MMHG | BODY MASS INDEX: 31.83 KG/M2 | OXYGEN SATURATION: 98 % | HEIGHT: 68 IN | SYSTOLIC BLOOD PRESSURE: 136 MMHG | HEART RATE: 83 BPM

## 2022-01-26 DIAGNOSIS — I10 BENIGN ESSENTIAL HTN: Primary | ICD-10-CM

## 2022-01-26 DIAGNOSIS — Z95.1 S/P CABG (CORONARY ARTERY BYPASS GRAFT): ICD-10-CM

## 2022-01-26 DIAGNOSIS — I65.21 CAROTID STENOSIS, RIGHT: ICD-10-CM

## 2022-01-26 DIAGNOSIS — R94.39 ABNORMAL NUCLEAR STRESS TEST: ICD-10-CM

## 2022-01-26 DIAGNOSIS — I25.10 CORONARY ARTERY DISEASE INVOLVING NATIVE CORONARY ARTERY OF NATIVE HEART WITHOUT ANGINA PECTORIS: ICD-10-CM

## 2022-01-26 DIAGNOSIS — I65.23 BILATERAL CAROTID ARTERY STENOSIS: ICD-10-CM

## 2022-01-26 DIAGNOSIS — M51.37 DDD (DEGENERATIVE DISC DISEASE), LUMBOSACRAL: ICD-10-CM

## 2022-01-26 PROBLEM — E66.811 CLASS 1 OBESITY DUE TO EXCESS CALORIES WITH SERIOUS COMORBIDITY AND BODY MASS INDEX (BMI) OF 32.0 TO 32.9 IN ADULT: Status: RESOLVED | Noted: 2021-07-25 | Resolved: 2022-01-26

## 2022-01-26 PROBLEM — E66.09 CLASS 1 OBESITY DUE TO EXCESS CALORIES WITH SERIOUS COMORBIDITY AND BODY MASS INDEX (BMI) OF 32.0 TO 32.9 IN ADULT: Status: RESOLVED | Noted: 2021-07-25 | Resolved: 2022-01-26

## 2022-01-26 PROCEDURE — 99214 OFFICE O/P EST MOD 30 MIN: CPT | Performed by: INTERNAL MEDICINE

## 2022-01-26 NOTE — PROGRESS NOTES
Marika Gutierrez  [id number]  1945  76 y.o.  male    Referring Provider: Abilio Martinez MD    Reason for  Visit:      Here for routine follow up   Initial visit to establish care with myself for ongoing longitudinal care related to cardiovascular issues   coronary artery disease   Coronary artery bypass grafting 1996  Prior history of aortic insufficiency   Cardiac workup test results as below: echo   Prior carotid artery stenosis per patient with no recent testing  S/P uneventful surgery and recovered well  Surgical wound healed very well.    under follow up Dr Chang for carotid stenosis   Cardiac workup test results as below: echo, stress test      Subjective    Mild chronic exertional shortness of breath on exertion relieved with rest  No significant cough or wheezing    No palpitations  No associated chest pain  No significant pedal edema    No fever or chills  No significant expectoration    No hemoptysis  No presyncope or syncope    Tolerating current medications well with no untoward side effects   Compliant with prescribed medication regimen. Tries to adhere to cardiac diet.     Overall much better   Building a barn     BP well controlled at home.       No bleeding, excessive bruising, gait instability or fall risks      History of present illness:  Marika Gutierrez is a 76 y.o. yo male with  coronary artery disease coronary artery bypass grafting  who presents today for   Chief Complaint   Patient presents with   • Coronary Artery Disease     3 mo f/u - echo results - s/p carotid endarterectomy   .    History  Past Medical History:   Diagnosis Date   • Aortic valve regurgitation    • Cancer (HCC)    • Carotid stenosis    • Cataract     right   • COPD (chronic obstructive pulmonary disease) (HCC)    • Coronary artery disease    • GERD (gastroesophageal reflux disease)    • Hard of hearing    • Hyperlipidemia    • Hypertension    • PONV (postoperative nausea and vomiting)    • Renal cell  carcinoma (HCC)    • TIA (transient ischemic attack)    ,   Past Surgical History:   Procedure Laterality Date   • BACK SURGERY     • CARDIAC CATHETERIZATION      X 4 -  -  -  -    • CAROTID ENDARTERECTOMY Left 2021    Procedure: LEFT CAROTID ENDARTERECTOMY WITH EEG;  Surgeon: Puneet Chang DO;  Location:  [facility];  Service: Vascular;  Laterality: Left;   • CAROTID ENDARTERECTOMY Right 10/18/2021    Procedure: RIGHT CAROTID ENDARTERECTOMY WITH EEG;  Surgeon: Puneet Chang DO;  Location:  [facility];  Service: Vascular;  Laterality: Right;   • CORONARY ARTERY BYPASS GRAFT      X 4   • EXTERNAL EAR SURGERY     • NEPHRECTOMY      LEFT - 2017   ,   History reviewed. No pertinent family history.,   Social History     Tobacco Use   • Smoking status: Former Smoker     Packs/day: 1.00     Years: 25.00     Pack years: 25.00     Types: Cigarettes     Quit date:      Years since quittin.0   • Smokeless tobacco: Former User     Types: Chew   Vaping Use   • Vaping Use: Never used   Substance Use Topics   • Alcohol use: Not Currently   • Drug use: Not Currently   ,     Medications  Current Outpatient Medications   Medication Sig Dispense Refill   • alfuzosin (UROXATRAL) 10 MG 24 hr tablet Take 10 mg by mouth Daily.     • amLODIPine (NORVASC) 10 MG tablet Take 10 mg by mouth Daily.     • atorvastatin (LIPITOR) 10 MG tablet Take 1 tablet by mouth Daily. 90 tablet 4   • citalopram (CeleXA) 20 MG tablet Take 20 mg by mouth Daily.     • clopidogrel (PLAVIX) 75 MG tablet Take 1 tablet by mouth Every Other Day. 90 tablet 1   • diazePAM (VALIUM) 5 MG tablet Take 5 mg by mouth Every Night.     • hydrALAZINE (APRESOLINE) 50 MG tablet Take 1 tablet by mouth 2 (two) times a day. 180 tablet 3   • isosorbide mononitrate (IMDUR) 60 MG 24 hr tablet Take 1 tablet by mouth Daily. 90 tablet 3   • nitroglycerin (NITROSTAT) 0.4 MG SL tablet Place 0.4 mg under the tongue Every 5 (Five)  "Minutes As Needed.     • pantoprazole (PROTONIX) 40 MG EC tablet Take 40 mg by mouth Daily.     • predniSONE (DELTASONE) 5 MG tablet Take 5 mg by mouth Take As Directed. Every other week, currently off of     • tamsulosin (FLOMAX) 0.4 MG capsule 24 hr capsule Take 1 capsule by mouth Daily. 90 capsule 3   • triamcinolone (KENALOG) 0.1 % cream Daily As Needed.       No current facility-administered medications for this visit.       Allergies:  Cyclobenzaprine, Hydrocodone-acetaminophen, and Pentazocine    Review of Systems  Review of Systems   Constitutional: Negative.   HENT: Negative.    Eyes: Negative.    Cardiovascular: Positive for dyspnea on exertion and leg swelling. Negative for chest pain, claudication, cyanosis, irregular heartbeat, near-syncope, orthopnea, palpitations, paroxysmal nocturnal dyspnea and syncope.   Respiratory: Positive for shortness of breath.    Endocrine: Negative.    Hematologic/Lymphatic: Negative.    Skin: Negative.    Musculoskeletal: Positive for arthritis, back pain and joint pain.   Gastrointestinal: Negative for anorexia.   Genitourinary: Negative.    Neurological: Negative.    Psychiatric/Behavioral: Negative.        Objective     Physical Exam:  /60   Pulse 83   Ht 172.7 cm (68\")   Wt 95.3 kg (210 lb)   SpO2 98%   BMI 31.93 kg/m²     Physical Exam  Constitutional:       Appearance: He is well-developed.   HENT:      Head: Normocephalic.   Neck:      Vascular: Normal carotid pulses. No carotid bruit or JVD.      Trachea: No tracheal tenderness or tracheal deviation.   Cardiovascular:      Rate and Rhythm: Regular rhythm.      Pulses: Normal pulses.      Heart sounds: Murmur heard.    Systolic murmur is present with a grade of 2/6.      Pulmonary:      Effort: Pulmonary effort is normal.      Breath sounds: No stridor.   Abdominal:      General: There is no distension.      Palpations: Abdomen is soft.      Tenderness: There is no abdominal tenderness.   Musculoskeletal: "      Cervical back: No edema.   Skin:     General: Skin is warm.      Findings: Bruising and ecchymosis present.   Neurological:      Mental Status: He is alert.      Cranial Nerves: No cranial nerve deficit.      Sensory: No sensory deficit.   Psychiatric:         Speech: Speech normal.         Behavior: Behavior normal.         Results Review:      Marika Gutierrez  Regadenoson Stress Test With Myocardial Perfusion SPECT (Multi Study)  Order# [id number]  Reading physician: Pradip Hoskins MD Ordering physician: Pradip Hoskins MD Study date: 21       Patient Information    Patient Name   Marika Gutierrez MRN   [id number] Legal Sex   Male  (Age)   1945 (76 y.o.)     Interpretation Summary    · Left ventricular ejection fraction is normal. (Calculated EF = 63%).  · Myocardial perfusion imaging indicates a medium-sized infarct located in the lateral wall with no significant ischemia noted.            Results for orders placed during the hospital encounter of 21    Adult Transthoracic Echo Complete w/ Color, Spectral and Contrast if necessary per protocol    Interpretation Summary  · Left ventricular ejection fraction appears to be 61 - 65%. Left ventricular systolic function is normal.  · Left ventricular diastolic function was indeterminate.  · Abnormal global longitudinal LV strain (GLS) = -15%.  · Moderate aortic valve regurgitation is present.  · Estimated right ventricular systolic pressure from tricuspid regurgitation is normal (<35 mmHg).  · Similar to prior echo         ____________________________________________________________________________________________________________________________________________  Health maintenance and recommendations    Low salt/ HTN/ Heart healthy carbohydrate restricted cardiac diet   The patient is advised to reduce or avoid caffeine or other cardiac stimulants.   Minimize or avoid  NSAID-type medications      Monitor for any signs of bleeding including red  or dark stools. Fall precautions.   Advised staying uptodate with immunizations per established standard guidelines.    Offered to give patient  a copy of my notes     Questions were encouraged, asked and answered to the patient's  understanding and satisfaction. Questions if any regarding current medications and side effects, need for refills and importance of compliance to medications stressed.    Reviewed available prior notes, consults, prior visits, laboratory findings, radiology and cardiology relevant reports. Updated chart as applicable. I have reviewed the patient's medical history in detail and updated the computerized patient record as relevant.      Updated patient regarding any new or relevant abnormalities on review of records or any new findings on physical exam. Mentioned to patient about purpose of visit and desirable health short and long term goals and objectives.    Primary to monitor CBC CMP Lipid panel and TSH as applicable    ___________________________________________________________________________________________________________________________________________       Procedures    Assessment/Plan   Diagnoses and all orders for this visit:    1. Benign essential HTN (Primary)    2. BMI 31.0-31.9,adult    3. Bilateral carotid artery stenosis    4. Coronary artery disease involving native coronary artery of native heart without angina pectoris    5. DDD (degenerative disc disease), lumbosacral    6. S/P CABG (coronary artery bypass graft) 1996 [place]     7. Carotid stenosis, right    8. Abnormal nuclear stress test infarction no ischemia           Plan    Patient expressed understanding  Encouraged and answered all questions   Discussed with the patient and all questioned fully answered. He will call me if any problems arise.   Discussed results of prior testing with patient : echo and myocardial perfusion scan      Monitor aortic insufficiency and left ventricular ejection fraction by  serial echo    Repeat echo 12/2022    Check BP and heart rates twice daily initially till blood pressures and heart rates under good control and then at least 3x / week,   If blood pressures continue to be well-controlled then can check week a month  at home and bring a recording for review next visit  If BP >130/85 or < 100/60 persistently over 3 reading 30 mins apart or if heart rates persistently above 100 bpm or less than 55 bpm call sooner for evaluation and advise     Monitor for any signs of bleeding including red or dark stools as well as easy bruisabilty. Fall precautions.     Keep LDL below 70 mg/dl. Monitor liver and renal functions.   Monitor CBC, CMP, TSH (as indicated) and Lipid Panel by primary     Keep f/u with vascular surgery        I support the patient's decision to take the Covid -19 vaccine   Had required complete course   No major issues   Now fully immunized    Had booster too              Return in about 6 months (around 7/26/2022).

## 2022-04-07 RX ORDER — CLOPIDOGREL BISULFATE 75 MG/1
75 TABLET ORAL DAILY
Qty: 90 TABLET | Refills: 3 | Status: SHIPPED | OUTPATIENT
Start: 2022-04-07 | End: 2023-03-24

## 2022-05-06 ENCOUNTER — APPOINTMENT (OUTPATIENT)
Dept: ULTRASOUND IMAGING | Facility: HOSPITAL | Age: 77
End: 2022-05-06

## 2022-05-25 ENCOUNTER — APPOINTMENT (OUTPATIENT)
Dept: ULTRASOUND IMAGING | Facility: HOSPITAL | Age: 77
End: 2022-05-25

## 2022-06-29 NOTE — TELEPHONE ENCOUNTER
Call placed to patient for refill request, last fill was April 2021. Patient states he received an e-mail that script was not sent in. I will place the order for Dr Hoskins to sign.

## 2022-06-30 RX ORDER — HYDRALAZINE HYDROCHLORIDE 50 MG/1
50 TABLET, FILM COATED ORAL 2 TIMES DAILY
Qty: 180 TABLET | Refills: 3 | Status: SHIPPED | OUTPATIENT
Start: 2022-06-30 | End: 2022-08-10 | Stop reason: SDUPTHER

## 2022-07-06 ENCOUNTER — OFFICE VISIT (OUTPATIENT)
Dept: OTOLARYNGOLOGY | Facility: CLINIC | Age: 77
End: 2022-07-06

## 2022-07-06 VITALS — SYSTOLIC BLOOD PRESSURE: 146 MMHG | DIASTOLIC BLOOD PRESSURE: 61 MMHG | TEMPERATURE: 97.7 F | HEART RATE: 68 BPM

## 2022-07-06 DIAGNOSIS — H91.93 BILATERAL HEARING LOSS, UNSPECIFIED HEARING LOSS TYPE: ICD-10-CM

## 2022-07-06 DIAGNOSIS — H61.23 BILATERAL IMPACTED CERUMEN: Primary | ICD-10-CM

## 2022-07-06 DIAGNOSIS — T16.2XXA FOREIGN BODY OF LEFT EAR, INITIAL ENCOUNTER: ICD-10-CM

## 2022-07-06 PROCEDURE — 69210 REMOVE IMPACTED EAR WAX UNI: CPT | Performed by: NURSE PRACTITIONER

## 2022-07-06 PROCEDURE — 69200 CLEAR OUTER EAR CANAL: CPT | Performed by: NURSE PRACTITIONER

## 2022-07-06 PROCEDURE — 99212 OFFICE O/P EST SF 10 MIN: CPT | Performed by: NURSE PRACTITIONER

## 2022-07-06 NOTE — PROGRESS NOTES
DONA Crump  [facility]  [address], [address]  [place] [address]  Fax [phone]  Phone [phone]      Visit Type: FOLLOW UP   Chief Complaint   Patient presents with   • Cerumen Impaction        HPI  Marika Gutierrez is a 76 y.o. male who presents for evaluation of ENT complaints.  He states his left ear feels stopped up.  He thinks this may be a hearing aid tip stuck in his left ear canal.  He believes this has been present for the last month.  He also has complaints of right-sided cerumen accumulation.  He has a longstanding history of hearing loss of both ears.      Past Medical History:   Diagnosis Date   • Aortic valve regurgitation    • Cancer (HCC)    • Carotid stenosis    • Cataract     right   • COPD (chronic obstructive pulmonary disease) ([facility])    • Coronary artery disease    • GERD (gastroesophageal reflux disease)    • Hard of hearing    • Hyperlipidemia    • Hypertension    • PONV (postoperative nausea and vomiting)    • Renal cell carcinoma (HCC)    • TIA (transient ischemic attack)        Past Surgical History:   Procedure Laterality Date   • BACK SURGERY     • CARDIAC CATHETERIZATION      X 4 - 2010 - 2005 - 2003 - 1996   • CAROTID ENDARTERECTOMY Left 8/18/2021    Procedure: LEFT CAROTID ENDARTERECTOMY WITH EEG;  Surgeon: Puneet Chang DO;  Location: [facility] OR ;  Service: Vascular;  Laterality: Left;   • CAROTID ENDARTERECTOMY Right 10/18/2021    Procedure: RIGHT CAROTID ENDARTERECTOMY WITH EEG;  Surgeon: Puneet Chang DO;  Location: [facility];  Service: Vascular;  Laterality: Right;   • CORONARY ARTERY BYPASS GRAFT      X 4   • EXTERNAL EAR SURGERY     • NEPHRECTOMY      LEFT - 2017       Family History: His family history is not on file.     Social History: He  reports that he quit smoking about 18 years ago. His smoking use included cigarettes. He has a 25.00  pack-year smoking history. He has quit using smokeless tobacco.  His smokeless tobacco use included chew. He reports previous alcohol use. He reports previous drug use.    Home Medications:  alfuzosin, amLODIPine, atorvastatin, citalopram, clopidogrel, diazePAM, hydrALAZINE, isosorbide mononitrate, nitroglycerin, pantoprazole, predniSONE, tamsulosin, and triamcinolone    Allergies:  He is allergic to cyclobenzaprine, hydrocodone-acetaminophen, and pentazocine.       Vital Signs:   Temp:  [97.7 °F (36.5 °C)] 97.7 °F (36.5 °C)  Heart Rate:  [68] 68  BP: (146)/(61) 146/61  ENT Physical Exam  Constitutional  Appearance: patient appears well-developed, well-nourished and well-groomed,  Communication/Voice: communication appropriate for developmental age; vocal quality normal;  Head and Face  Appearance: head appears normal and face appears atraumatic;  Ear  Hearing: impaired to conversational voice;  Auricles: right auricle normal; left auricle normal;  External Mastoids: right external mastoid normal; left external mastoid normal;  Tympanic Membranes: right tympanic membrane normal; left tympanic membrane normal;  Ear comments: Right cerumen impaction removed under microscopy with suction and forceps.  Left EAC with cerumen impaction and hearing aid tip removed under microscopy with suction and forceps.  Respiratory  Inspection: breathing unlabored;  Neurovestibular  Mental Status: alert and oriented;  Psychiatric: mood normal; affect is appropriate;       PROCEDURE NOTE    LOCATION: [facility]  PROVIDER: SABINE Jamison   PREOPERATIVE DIAGNOSIS: Cerumen Impaction bilaterally, foreign body of left ear canal   POSTOPERATIVE DIAGNOSIS: Same  PROCEDURE: Cerumen removal, foreign body removal  ANESTHESIA: None   REASON FOR THE OPERATION: The patient verbally consented to intervention after a full discussion of risks, benefits, and alternatives. No guarantees were made or implied.   DETAILS OF THE OPERATION: The patient  was reclined in the procedure room chair. The binocular microscope was used to visualize the ear canal and tympanic membrane. Cerumen was then removed from the both ears using an alligator forceps and suction.  A foreign body (hearing aid tip) was also removed from the left ear canal.  Following the procedure, the bilateral EACs were clear without lesion and the bilateral tympanic membranes were intact without effusion.  Patient reported significant improvement in hearing following cerumen removal and foreign body removal.  Patient tolerated procedure well and was without complications      Result Review    RESULTS REVIEW    I have reviewed the patients old records in the chart.    Assessment & Plan    Diagnoses and all orders for this visit:    1. Bilateral impacted cerumen (Primary)    2. Foreign body of left ear, initial encounter    3. Bilateral hearing loss, unspecified hearing loss type       Bilateral cerumen impactions were removed.  A foreign body was also removed to the left ear canal.  The patient tolerated procedure well.  He reported significant improvement in hearing bilaterally following the procedure.  He tolerated the procedure well.  He may follow-up with audiogram if he wishes.    Return if symptoms worsen or fail to improve, for Recheck, With Audio.      Leana Peralta, DONA  07/06/22  17:04 CDT

## 2022-07-13 RX ORDER — AMLODIPINE BESYLATE 10 MG/1
10 TABLET ORAL DAILY
Qty: 90 TABLET | Refills: 3 | Status: SHIPPED | OUTPATIENT
Start: 2022-07-13

## 2022-07-22 ENCOUNTER — TELEPHONE (OUTPATIENT)
Dept: VASCULAR SURGERY | Facility: CLINIC | Age: 77
End: 2022-07-22

## 2022-07-25 ENCOUNTER — OFFICE VISIT (OUTPATIENT)
Dept: VASCULAR SURGERY | Facility: CLINIC | Age: 77
End: 2022-07-25

## 2022-07-25 ENCOUNTER — HOSPITAL ENCOUNTER (OUTPATIENT)
Dept: ULTRASOUND IMAGING | Facility: HOSPITAL | Age: 77
Discharge: HOME | End: 2022-07-25
Admitting: NURSE PRACTITIONER

## 2022-07-25 VITALS
HEART RATE: 73 BPM | HEIGHT: 68 IN | SYSTOLIC BLOOD PRESSURE: 122 MMHG | OXYGEN SATURATION: 98 % | WEIGHT: 200 LBS | BODY MASS INDEX: 30.31 KG/M2 | DIASTOLIC BLOOD PRESSURE: 66 MMHG

## 2022-07-25 DIAGNOSIS — E78.2 MIXED HYPERLIPIDEMIA: ICD-10-CM

## 2022-07-25 DIAGNOSIS — I65.23 BILATERAL CAROTID ARTERY STENOSIS: Primary | ICD-10-CM

## 2022-07-25 DIAGNOSIS — I65.23 BILATERAL CAROTID ARTERY STENOSIS: ICD-10-CM

## 2022-07-25 DIAGNOSIS — I10 BENIGN ESSENTIAL HTN: ICD-10-CM

## 2022-07-25 PROCEDURE — 99214 OFFICE O/P EST MOD 30 MIN: CPT | Performed by: NURSE PRACTITIONER

## 2022-07-25 PROCEDURE — 93880 EXTRACRANIAL BILAT STUDY: CPT

## 2022-07-25 PROCEDURE — 93880 EXTRACRANIAL BILAT STUDY: CPT | Performed by: SURGERY

## 2022-08-04 NOTE — PROGRESS NOTES
Chief Complaint  Coronary Artery Disease (6mo F/U. )    Subjective          Marika Gutierrez presents to [facility] for routine follow up.  He has coronary artery disease s/p CABG in 1996, aortic valve regurgitation, COPD, renal cell carcinoma, bilateral carotid artery stenosis, hypertension, hyperlipidemia and obesity. He continues to complain of chronic shortness of breath. He reports occasional palpitations. The patient denies chest pain, dizziness, syncope, orthopnea, PND, swelling or decreased stamina. He denies any signs of bleeding.     Coronary Artery Disease  Presents for follow-up visit. Symptoms include palpitations and shortness of breath. Pertinent negatives include no chest pain, chest pressure, chest tightness, dizziness, leg swelling, muscle weakness or weight gain. Risk factors include hyperlipidemia. The symptoms have been stable. Compliance with diet is variable. Compliance with exercise is variable. Compliance with medications is good.   Hypertension  This is a chronic problem. The current episode started more than 1 year ago. The problem is uncontrolled. Associated symptoms include palpitations and shortness of breath. Pertinent negatives include no anxiety, blurred vision, chest pain, headaches, malaise/fatigue, neck pain, orthopnea, peripheral edema, PND or sweats. Risk factors for coronary artery disease include male gender and dyslipidemia. Current antihypertension treatment includes calcium channel blockers and direct vasodilators. Hypertensive end-organ damage includes CAD/MI.   Hyperlipidemia  This is a chronic problem. The current episode started more than 1 year ago. Associated symptoms include shortness of breath. Pertinent negatives include no chest pain. Current antihyperlipidemic treatment includes statins. Risk factors for coronary artery disease include hypertension, dyslipidemia and male sex.       Objective   Vital Signs:   /58   Pulse 65    "Ht 172.7 cm (68\")   Wt 93.9 kg (207 lb)   SpO2 98%   BMI 31.47 kg/m²     Vitals and nursing note reviewed.   Constitutional:       General: Awake.      Appearance: Normal and healthy appearance. Well-developed, normal weight and not in distress.   Eyes:      General: Lids are normal.      Conjunctiva/sclera: Conjunctivae normal.      Pupils: Pupils are equal, round, and reactive to light.   HENT:      Head: Normocephalic and atraumatic.      Nose: Nose normal.   Neck:      Vascular: No JVR. JVD normal.   Pulmonary:      Effort: Pulmonary effort is normal.      Breath sounds: Normal breath sounds. No wheezing. No rhonchi. No rales.   Chest:      Chest wall: Not tender to palpatation.   Cardiovascular:      PMI at left midclavicular line. Normal rate. Irregular rhythm. Normal S1. Normal S2.      Murmurs: There is a grade 2/6 high frequency blowing holosystolic murmur at the apex. There is a grade 2/4 high frequency blowing decrescendo, early diastolic murmur at the URSB, radiating to the apex.      No gallop. No click. No rub.   Pulses:     Intact distal pulses.   Edema:     Peripheral edema absent.   Abdominal:      General: Bowel sounds are normal.      Palpations: Abdomen is soft.      Tenderness: There is no abdominal tenderness.   Musculoskeletal: Normal range of motion.         General: No tenderness.      Cervical back: Normal range of motion. Skin:     General: Skin is warm and dry.   Neurological:      General: No focal deficit present.      Mental Status: Alert, oriented to person, place, and time and oriented to person, place and time.   Psychiatric:         Attention and Perception: Attention and perception normal.         Mood and Affect: Mood and affect normal.         Speech: Speech normal.         Behavior: Behavior normal. Behavior is cooperative.         Thought Content: Thought content normal.         Cognition and Memory: Cognition and memory normal.         Judgment: Judgment normal.      "   Result Review :   The following data was reviewed by: DONA Johnson on 8/10/2022:  Common labs    Common Labsle 8/16/21 8/16/21 10/15/21 10/15/21    0920 0921 1039 1039   Glucose 105 (A)   146 (A)   BUN 22   23   Creatinine 1.30 (A)   1.29 (A)   eGFR Non African Am 54 (A)   54 (A)   Sodium 134 (A)   138   Potassium 4.4   4.5   Chloride 101   103   Calcium 9.5   9.4   WBC  7.06 7.64    Hemoglobin  14.3 13.7    Hematocrit  41.6 41.3    Platelets  210 213    (A) Abnormal value            Data reviewed: Cardiology studies Lexiscan 7/22/21, 2D echo 11/30/2021 and carotid ultrasound 6/15/21 and 7/25/2022    ECG 12 Lead    Date/Time: 8/10/2022 10:27 AM  Performed by: Ronit Galloway APRN  Authorized by: Ronit Galloway APRN   Comparison: compared with previous ECG   Rhythm: sinus rhythm  Ectopy: unifocal PVCs and atrial premature contractions  Rate: normal  BPM: 65  QRS axis: normal    Clinical impression: abnormal EKG              Assessment and Plan    Diagnoses and all orders for this visit:    1. Coronary artery disease involving native coronary artery of native heart without angina pectoris (Primary)- No clinical signs of ischemia. Lexiscan 7/22/21 showed no ischemia.  Stable.    2. S/P CABG (coronary artery bypass graft)- 1996 in [place], [place]. Pt continues on aspirin and plavix. Denies bleeding.      3. Nonrheumatic aortic valve insufficiency- moderate on echo 11/30/2021. Monitor with routine echo 11/22.     4. Benign essential HTN- blood pressure is mildly elevated in office today. Pt reports consistently lower than 130/90 at home. Continue amlodipine and hydralazine.  Monitor and record daily blood pressure. Report readings consistently higher than 130/90 or consistently lower than 100/60.     5. Mixed hyperlipidemia- management per PCP. Continue Lipitor.     6. Class 1 obesity due to excess calories with serious comorbidity and body mass index (BMI) of 31.0 to 31.9 in - Patient's Body mass  index is 31.47 kg/m². indicating that he is obese (BMI >30). Obesity-related health conditions include the following: hypertension and dyslipidemias. Obesity is unchanged. BMI is is above average; no BMI management plan is appropriate. We discussed low calorie, low carb based diet program, portion control and increasing exercise.      Follow Up   Return in about 6 months (around 2/10/2023) for Next scheduled follow up with Dr. Hoskins.  Patient was given instructions and counseling regarding his condition or for health maintenance advice. Please see specific information pulled into the AVS if appropriate.

## 2022-08-10 ENCOUNTER — OFFICE VISIT (OUTPATIENT)
Dept: CARDIOLOGY | Facility: CLINIC | Age: 77
End: 2022-08-10

## 2022-08-10 VITALS
BODY MASS INDEX: 31.37 KG/M2 | DIASTOLIC BLOOD PRESSURE: 58 MMHG | HEART RATE: 65 BPM | SYSTOLIC BLOOD PRESSURE: 136 MMHG | WEIGHT: 207 LBS | OXYGEN SATURATION: 98 % | HEIGHT: 68 IN

## 2022-08-10 DIAGNOSIS — Z95.1 S/P CABG (CORONARY ARTERY BYPASS GRAFT): ICD-10-CM

## 2022-08-10 DIAGNOSIS — I25.10 CORONARY ARTERY DISEASE INVOLVING NATIVE CORONARY ARTERY OF NATIVE HEART WITHOUT ANGINA PECTORIS: Primary | ICD-10-CM

## 2022-08-10 DIAGNOSIS — I35.1 NONRHEUMATIC AORTIC VALVE INSUFFICIENCY: ICD-10-CM

## 2022-08-10 DIAGNOSIS — E78.2 MIXED HYPERLIPIDEMIA: ICD-10-CM

## 2022-08-10 DIAGNOSIS — I10 BENIGN ESSENTIAL HTN: ICD-10-CM

## 2022-08-10 DIAGNOSIS — E66.09 CLASS 1 OBESITY DUE TO EXCESS CALORIES WITH SERIOUS COMORBIDITY AND BODY MASS INDEX (BMI) OF 31.0 TO 31.9 IN ADULT: ICD-10-CM

## 2022-08-10 PROCEDURE — 99214 OFFICE O/P EST MOD 30 MIN: CPT | Performed by: NURSE PRACTITIONER

## 2022-08-10 PROCEDURE — 93000 ELECTROCARDIOGRAM COMPLETE: CPT | Performed by: NURSE PRACTITIONER

## 2022-08-10 RX ORDER — HYDRALAZINE HYDROCHLORIDE 50 MG/1
50 TABLET, FILM COATED ORAL 2 TIMES DAILY
Qty: 180 TABLET | Refills: 3 | Status: SHIPPED | OUTPATIENT
Start: 2022-08-10

## 2022-11-28 ENCOUNTER — HOSPITAL ENCOUNTER (OUTPATIENT)
Dept: CARDIOLOGY | Facility: HOSPITAL | Age: 77
Discharge: HOME | End: 2022-11-28
Admitting: NURSE PRACTITIONER

## 2022-11-28 VITALS
HEIGHT: 68 IN | DIASTOLIC BLOOD PRESSURE: 58 MMHG | SYSTOLIC BLOOD PRESSURE: 136 MMHG | WEIGHT: 207 LBS | BODY MASS INDEX: 31.37 KG/M2

## 2022-11-28 DIAGNOSIS — I35.1 NONRHEUMATIC AORTIC VALVE INSUFFICIENCY: ICD-10-CM

## 2022-11-28 PROCEDURE — 93306 TTE W/DOPPLER COMPLETE: CPT | Performed by: INTERNAL MEDICINE

## 2022-11-28 PROCEDURE — 93356 MYOCRD STRAIN IMG SPCKL TRCK: CPT | Performed by: INTERNAL MEDICINE

## 2022-11-28 PROCEDURE — 93356 MYOCRD STRAIN IMG SPCKL TRCK: CPT

## 2022-11-28 PROCEDURE — 93306 TTE W/DOPPLER COMPLETE: CPT

## 2022-11-29 LAB
BH CV ECHO LEFT VENTRICLE GLOBAL LONGITUDINAL STRAIN: -6.6 %
BH CV ECHO MEAS - AO MAX PG: 6.5 MMHG
BH CV ECHO MEAS - AO MEAN PG: 4 MMHG
BH CV ECHO MEAS - AO ROOT DIAM: 3.4 CM
BH CV ECHO MEAS - AO V2 MAX: 127 CM/SEC
BH CV ECHO MEAS - AO V2 VTI: 25.9 CM
BH CV ECHO MEAS - AVA(I,D): 3.6 CM2
BH CV ECHO MEAS - EDV(CUBED): 87.5 ML
BH CV ECHO MEAS - EDV(MOD-SP2): 145 ML
BH CV ECHO MEAS - EDV(MOD-SP4): 167 ML
BH CV ECHO MEAS - EF(MOD-BP): 56.2 %
BH CV ECHO MEAS - EF(MOD-SP2): 56.3 %
BH CV ECHO MEAS - EF(MOD-SP4): 57.6 %
BH CV ECHO MEAS - ESV(CUBED): 33.4 ML
BH CV ECHO MEAS - ESV(MOD-SP2): 63.4 ML
BH CV ECHO MEAS - ESV(MOD-SP4): 70.8 ML
BH CV ECHO MEAS - FS: 27.5 %
BH CV ECHO MEAS - IVS/LVPW: 1.04 CM
BH CV ECHO MEAS - IVSD: 1.41 CM
BH CV ECHO MEAS - LA DIMENSION: 4.6 CM
BH CV ECHO MEAS - LAT PEAK E' VEL: 9.8 CM/SEC
BH CV ECHO MEAS - LV DIASTOLIC VOL/BSA (35-75): 80.5 CM2
BH CV ECHO MEAS - LV MASS(C)D: 238.3 GRAMS
BH CV ECHO MEAS - LV MAX PG: 4.4 MMHG
BH CV ECHO MEAS - LV MEAN PG: 2 MMHG
BH CV ECHO MEAS - LV SYSTOLIC VOL/BSA (12-30): 34.1 CM2
BH CV ECHO MEAS - LV V1 MAX: 105 CM/SEC
BH CV ECHO MEAS - LV V1 VTI: 22.7 CM
BH CV ECHO MEAS - LVIDD: 4.4 CM
BH CV ECHO MEAS - LVIDS: 3.2 CM
BH CV ECHO MEAS - LVOT AREA: 4.2 CM2
BH CV ECHO MEAS - LVOT DIAM: 2.3 CM
BH CV ECHO MEAS - LVPWD: 1.35 CM
BH CV ECHO MEAS - MED PEAK E' VEL: 7.4 CM/SEC
BH CV ECHO MEAS - MV A MAX VEL: 83.4 CM/SEC
BH CV ECHO MEAS - MV DEC SLOPE: 485 CM/SEC2
BH CV ECHO MEAS - MV DEC TIME: 0.36 MSEC
BH CV ECHO MEAS - MV E MAX VEL: 49.5 CM/SEC
BH CV ECHO MEAS - MV E/A: 0.59
BH CV ECHO MEAS - MV MAX PG: 6.7 MMHG
BH CV ECHO MEAS - MV MEAN PG: 3 MMHG
BH CV ECHO MEAS - MV P1/2T: 79.7 MSEC
BH CV ECHO MEAS - MV V2 VTI: 27.6 CM
BH CV ECHO MEAS - MVA(P1/2T): 2.8 CM2
BH CV ECHO MEAS - MVA(VTI): 3.4 CM2
BH CV ECHO MEAS - PA V2 MAX: 123 CM/SEC
BH CV ECHO MEAS - RAP SYSTOLE: 5 MMHG
BH CV ECHO MEAS - RVSP: 54.6 MMHG
BH CV ECHO MEAS - SI(MOD-SP2): 39.3 ML/M2
BH CV ECHO MEAS - SI(MOD-SP4): 46.4 ML/M2
BH CV ECHO MEAS - SV(LVOT): 94.3 ML
BH CV ECHO MEAS - SV(MOD-SP2): 81.6 ML
BH CV ECHO MEAS - SV(MOD-SP4): 96.2 ML
BH CV ECHO MEAS - TR MAX PG: 49.6 MMHG
BH CV ECHO MEAS - TR MAX VEL: 352 CM/SEC
BH CV ECHO MEASUREMENTS AVERAGE E/E' RATIO: 5.76
BH CV VAS BP LEFT ARM: NORMAL MMHG
BH CV XLRA - RV BASE: 4.8 CM
BH CV XLRA - RV LENGTH: 8.4 CM
BH CV XLRA - RV MID: 4.7 CM
LEFT ATRIUM VOLUME INDEX: 21.4 ML/M2
LEFT ATRIUM VOLUME: 45.2 ML
MAXIMAL PREDICTED HEART RATE: 144 BPM
STRESS TARGET HR: 122 BPM

## 2023-01-07 DIAGNOSIS — C64.2 RENAL CELL CARCINOMA OF LEFT KIDNEY: ICD-10-CM

## 2023-01-09 RX ORDER — TAMSULOSIN HYDROCHLORIDE 0.4 MG/1
CAPSULE ORAL
Qty: 90 CAPSULE | Refills: 3 | Status: SHIPPED | OUTPATIENT
Start: 2023-01-09 | End: 2023-03-09 | Stop reason: SDUPTHER

## 2023-01-27 ENCOUNTER — TELEPHONE (OUTPATIENT)
Dept: VASCULAR SURGERY | Facility: CLINIC | Age: 78
End: 2023-01-27
Payer: MEDICARE

## 2023-01-30 RX ORDER — ISOSORBIDE MONONITRATE 60 MG/1
TABLET, EXTENDED RELEASE ORAL
Qty: 90 TABLET | Refills: 3 | Status: SHIPPED | OUTPATIENT
Start: 2023-01-30

## 2023-02-06 ENCOUNTER — TELEPHONE (OUTPATIENT)
Dept: UROLOGY | Facility: CLINIC | Age: 78
End: 2023-02-06
Payer: MEDICARE

## 2023-02-06 NOTE — TELEPHONE ENCOUNTER
LVM FOR PT TO C/B AND GET APPT RESCHEDULED SINCE HE DID NOT HAVE XRAY OR BLOOD WORK COMPLETED PRIOR TO APPT.

## 2023-02-09 ENCOUNTER — TELEPHONE (OUTPATIENT)
Dept: VASCULAR SURGERY | Facility: CLINIC | Age: 78
End: 2023-02-09
Payer: MEDICARE

## 2023-02-10 ENCOUNTER — OFFICE VISIT (OUTPATIENT)
Dept: VASCULAR SURGERY | Facility: CLINIC | Age: 78
End: 2023-02-10
Payer: MEDICARE

## 2023-02-10 ENCOUNTER — HOSPITAL ENCOUNTER (OUTPATIENT)
Dept: ULTRASOUND IMAGING | Facility: HOSPITAL | Age: 78
Discharge: HOME | End: 2023-02-10
Admitting: NURSE PRACTITIONER
Payer: MEDICARE

## 2023-02-10 VITALS — SYSTOLIC BLOOD PRESSURE: 114 MMHG | DIASTOLIC BLOOD PRESSURE: 66 MMHG | HEART RATE: 78 BPM | OXYGEN SATURATION: 96 %

## 2023-02-10 DIAGNOSIS — I10 BENIGN ESSENTIAL HTN: ICD-10-CM

## 2023-02-10 DIAGNOSIS — E78.2 MIXED HYPERLIPIDEMIA: ICD-10-CM

## 2023-02-10 DIAGNOSIS — I65.23 BILATERAL CAROTID ARTERY STENOSIS: Primary | ICD-10-CM

## 2023-02-10 DIAGNOSIS — I65.23 BILATERAL CAROTID ARTERY STENOSIS: ICD-10-CM

## 2023-02-10 PROCEDURE — 93880 EXTRACRANIAL BILAT STUDY: CPT | Performed by: SURGERY

## 2023-02-10 PROCEDURE — 99214 OFFICE O/P EST MOD 30 MIN: CPT | Performed by: NURSE PRACTITIONER

## 2023-02-10 PROCEDURE — 93880 EXTRACRANIAL BILAT STUDY: CPT

## 2023-02-13 ENCOUNTER — OFFICE VISIT (OUTPATIENT)
Dept: CARDIOLOGY | Facility: CLINIC | Age: 78
End: 2023-02-13
Payer: MEDICARE

## 2023-02-13 VITALS
WEIGHT: 208 LBS | SYSTOLIC BLOOD PRESSURE: 122 MMHG | HEIGHT: 68 IN | OXYGEN SATURATION: 98 % | DIASTOLIC BLOOD PRESSURE: 66 MMHG | HEART RATE: 94 BPM | BODY MASS INDEX: 31.52 KG/M2

## 2023-02-13 DIAGNOSIS — I10 BENIGN ESSENTIAL HTN: ICD-10-CM

## 2023-02-13 DIAGNOSIS — I35.1 NONRHEUMATIC AORTIC VALVE INSUFFICIENCY: ICD-10-CM

## 2023-02-13 DIAGNOSIS — Z95.1 S/P CABG (CORONARY ARTERY BYPASS GRAFT): ICD-10-CM

## 2023-02-13 DIAGNOSIS — I25.10 CORONARY ARTERY DISEASE INVOLVING NATIVE CORONARY ARTERY OF NATIVE HEART WITHOUT ANGINA PECTORIS: Primary | ICD-10-CM

## 2023-02-13 DIAGNOSIS — E66.09 CLASS 1 OBESITY DUE TO EXCESS CALORIES WITH SERIOUS COMORBIDITY AND BODY MASS INDEX (BMI) OF 31.0 TO 31.9 IN ADULT: ICD-10-CM

## 2023-02-13 DIAGNOSIS — E78.2 MIXED HYPERLIPIDEMIA: ICD-10-CM

## 2023-02-13 PROCEDURE — 99214 OFFICE O/P EST MOD 30 MIN: CPT | Performed by: NURSE PRACTITIONER

## 2023-02-13 PROCEDURE — 93000 ELECTROCARDIOGRAM COMPLETE: CPT | Performed by: NURSE PRACTITIONER

## 2023-02-13 NOTE — PROGRESS NOTES
Chief Complaint  Coronary Artery Disease (6mo F/U.)    Subjective          Marika Gutierrez presents to [facility] CARDIOLOGY [id number] for routine follow up.  He has coronary artery disease s/p CABG in 1996, aortic valve regurgitation, aortic valve insufficiency, pulmonary hypertension, COPD, renal cell carcinoma, bilateral carotid artery stenosis, hypertension, hyperlipidemia and obesity. He continues to complain of chronic shortness of breath. He reports occasional palpitations. The patient denies chest pain, dizziness, syncope, orthopnea, PND, swelling or decreased stamina. He denies any signs of bleeding.     Coronary Artery Disease  Presents for follow-up visit. Symptoms include palpitations and shortness of breath. Pertinent negatives include no chest pain, chest pressure, chest tightness, dizziness, leg swelling, muscle weakness or weight gain. Risk factors include hyperlipidemia. The symptoms have been stable. Compliance with diet is variable. Compliance with exercise is variable. Compliance with medications is good.   Hypertension  This is a chronic problem. The current episode started more than 1 year ago. The problem is uncontrolled. Associated symptoms include palpitations and shortness of breath. Pertinent negatives include no anxiety, blurred vision, chest pain, headaches, malaise/fatigue, neck pain, orthopnea, peripheral edema, PND or sweats. Risk factors for coronary artery disease include male gender and dyslipidemia. Current antihypertension treatment includes calcium channel blockers and direct vasodilators. Hypertensive end-organ damage includes CAD/MI.   Hyperlipidemia  This is a chronic problem. The current episode started more than 1 year ago. Associated symptoms include shortness of breath. Pertinent negatives include no chest pain. Current antihyperlipidemic treatment includes statins. Risk factors for coronary artery disease include hypertension, dyslipidemia and male sex.  "    I have reviewed and confirmed the accuracy of the ROS  Ronit YenDONA george      Objective   Vital Signs:   /66   Pulse 94   Ht 172.7 cm (68\")   Wt 94.3 kg (208 lb)   SpO2 98%   BMI 31.63 kg/m²     Vitals and nursing note reviewed.   Constitutional:       General: Awake.      Appearance: Normal and healthy appearance. Well-developed, normal weight and not in distress.   Eyes:      General: Lids are normal.      Conjunctiva/sclera: Conjunctivae normal.      Pupils: Pupils are equal, round, and reactive to light.   HENT:      Head: Normocephalic and atraumatic.      Nose: Nose normal.   Neck:      Vascular: No JVR. JVD normal.   Pulmonary:      Effort: Pulmonary effort is normal.      Breath sounds: Normal breath sounds. No wheezing. No rhonchi. No rales.   Chest:      Chest wall: Not tender to palpatation.   Cardiovascular:      PMI at left midclavicular line. Normal rate. Irregular rhythm. Normal S1. Normal S2.      Murmurs: There is a grade 2/6 high frequency blowing holosystolic murmur at the apex. There is a grade 2/4 high frequency blowing decrescendo, early diastolic murmur at the URSB, radiating to the apex.      No gallop. No click. No rub.   Pulses:     Intact distal pulses.   Edema:     Peripheral edema absent.   Abdominal:      General: Bowel sounds are normal.      Palpations: Abdomen is soft.      Tenderness: There is no abdominal tenderness.   Musculoskeletal: Normal range of motion.         General: No tenderness.      Cervical back: Normal range of motion. Skin:     General: Skin is warm and dry.   Neurological:      General: No focal deficit present.      Mental Status: Alert, oriented to person, place, and time and oriented to person, place and time.   Psychiatric:         Attention and Perception: Attention and perception normal.         Mood and Affect: Mood and affect normal.         Speech: Speech normal.         Behavior: Behavior normal. Behavior is cooperative.         " Thought Content: Thought content normal.         Cognition and Memory: Cognition and memory normal.         Judgment: Judgment normal.        Result Review :   The following data was reviewed by: DONA Johnson on 2/13/2023:    Data reviewed: Cardiology studies Lexiscan 7/22/21, 2D echo 11/30/2021 and 11/28/2022 and carotid ultrasound 6/15/21 and 7/25/2022    ECG 12 Lead    Date/Time: 2/13/2023 10:11 AM  Performed by: Ronit Galloway APRN  Authorized by: Ronit Galloway APRN   Comparison: compared with previous ECG from 8/10/2022  Similar to previous ECG  Rhythm: sinus rhythm  Ectopy: atrial premature contractions  Rate: normal  BPM: 94  QRS axis: normal    Clinical impression: abnormal EKG              Assessment and Plan    Diagnoses and all orders for this visit:    1. Coronary artery disease involving native coronary artery of native heart without angina pectoris (Primary)- No clinical signs of ischemia. Stable.    2. S/P CABG (coronary artery bypass graft)- 1996 in [place], [place]. Pt continues on aspirin and plavix. Denies bleeding.      3. Nonrheumatic aortic valve insufficiency- moderate on echo 11/28/2022. Monitor with routine echo 11/23.     4. Benign essential HTN- blood pressure is well controlled. Continue amlodipine and hydralazine.  Monitor and record daily blood pressure. Report readings consistently higher than 130/80 or consistently lower than 100/60.     5. Mixed hyperlipidemia- management per PCP. Continue Lipitor.     6. Class 1 obesity due to excess calories with serious comorbidity and body mass index (BMI) of 31.0 to 31.9 in - Patient's Body mass index is 31.63 kg/m². indicating that he is obese (BMI >30). Obesity-related health conditions include the following: hypertension and dyslipidemias. Obesity is unchanged. BMI is is above average; no BMI management plan is appropriate. We discussed low calorie, low carb based diet program, portion control and increasing  exercise.      Follow Up   Return in about 6 months (around 8/13/2023) for Next scheduled follow up.  Patient was given instructions and counseling regarding his condition or for health maintenance advice. Please see specific information pulled into the AVS if appropriate.

## 2023-03-02 ENCOUNTER — HOSPITAL ENCOUNTER (OUTPATIENT)
Dept: GENERAL RADIOLOGY | Facility: HOSPITAL | Age: 78
Discharge: HOME | End: 2023-03-02
Admitting: UROLOGY
Payer: MEDICARE

## 2023-03-02 DIAGNOSIS — C64.2 RENAL CELL CARCINOMA OF LEFT KIDNEY: ICD-10-CM

## 2023-03-02 PROCEDURE — 71046 X-RAY EXAM CHEST 2 VIEWS: CPT

## 2023-03-08 NOTE — PROGRESS NOTES
Chief Complaint  Post void dribbling    Subjective          Marika Gutierrez presents to [facility] UROLOGY [facility]   Returns today now 6 years status post hand-assisted laparoscopic left radical nephrectomy for  pT1b,n0,m0; Grade 2 clear-cell renal cell carcinoma.  This risk stratify rise is low risk renal cell carcinoma.  Hx  10/2017: left hand assisted lap radical nephrectomy ([place], [place])  Incidental left renal mass found when pt had acute diverticulitis on CT   6x6x5.7 cm clear cell renal cell carcinoma - pT1b,n0,m0; Grade 2    He is also complaining about nocturia.  He does have some slowing of his stream but predominant symptoms are at night.  Says he does not have problems with sleeping.  Severity is up to 3 times nightly      Current Outpatient Medications:   •  amLODIPine (NORVASC) 10 MG tablet, Take 1 tablet by mouth Daily., Disp: 90 tablet, Rfl: 3  •  atorvastatin (LIPITOR) 10 MG tablet, Take 1 tablet by mouth Daily., Disp: 90 tablet, Rfl: 4  •  citalopram (CeleXA) 20 MG tablet, Take 1 tablet by mouth Daily., Disp: , Rfl:   •  clopidogrel (PLAVIX) 75 MG tablet, Take 1 tablet by mouth Daily., Disp: 90 tablet, Rfl: 3  •  diazePAM (VALIUM) 5 MG tablet, Take 1 tablet by mouth Every Night., Disp: , Rfl:   •  hydrALAZINE (APRESOLINE) 50 MG tablet, Take 1 tablet by mouth 2 (Two) Times a Day., Disp: 180 tablet, Rfl: 3  •  isosorbide mononitrate (IMDUR) 60 MG 24 hr tablet, TAKE 1 TABLET DAILY, Disp: 90 tablet, Rfl: 3  •  nitroglycerin (NITROSTAT) 0.4 MG SL tablet, Place 1 tablet under the tongue Every 5 (Five) Minutes As Needed., Disp: , Rfl:   •  pantoprazole (PROTONIX) 40 MG EC tablet, Take 1 tablet by mouth Daily., Disp: , Rfl:   •  predniSONE (DELTASONE) 5 MG tablet, Take 1 tablet by mouth Take As Directed. Every other week, currently off of, Disp: , Rfl:   •  triamcinolone (KENALOG) 0.1 % cream, Daily As Needed., Disp: , Rfl:   •  alfuzosin (UROXATRAL) 10 MG 24 hr tablet, Take 1 tablet  "by mouth Daily., Disp: 90 tablet, Rfl: 3  •  tamsulosin (FLOMAX) 0.4 MG capsule 24 hr capsule, Take 1 capsule by mouth Daily., Disp: 90 capsule, Rfl: 3  Past Medical History:   Diagnosis Date   • Aortic valve regurgitation    • Arthritis 2000   • Asthma COPD   • Cancer (HCC)    • Carotid stenosis    • Cataract     right   • COPD (chronic obstructive pulmonary disease) (HCC)    • Coronary artery disease    • Depression 2011   • GERD (gastroesophageal reflux disease)    • Hard of hearing    • Hyperlipidemia    • Hypertension    • PONV (postoperative nausea and vomiting)    • Renal cell carcinoma (HCC)    • TIA (transient ischemic attack)      Past Surgical History:   Procedure Laterality Date   • BACK SURGERY     • CARDIAC CATHETERIZATION      X 4 - 2010 - 2005 - 2003 - 1996   • CAROTID ENDARTERECTOMY Left 08/18/2021    Procedure: LEFT CAROTID ENDARTERECTOMY WITH EEG;  Surgeon: Puneet Chang DO;  Location:  [facility]BR[facility] OR 12;  Service: Vascular;  Laterality: Left;   • CAROTID ENDARTERECTOMY Right 10/18/2021    Procedure: RIGHT CAROTID ENDARTERECTOMY WITH EEG;  Surgeon: Puneet Chang DO;  Location:  [facility] OR 12;  Service: Vascular;  Laterality: Right;   • CORONARY ARTERY BYPASS GRAFT      X 4   • EXTERNAL EAR SURGERY     • NEPHRECTOMY      LEFT - 2017           Review of Systems  Review  of systems  Constitutional: Negative for chills and fever.   Gastrointestinal: Negative for abdominal pain, anal bleeding and blood in stool.   Genitourinary: Negative for flank pain and hematuria.      Objective   PHYSICAL EXAM  Vital Signs:   Temp 97.1 °F (36.2 °C)   Ht 172.7 cm (68\")   Wt 95.5 kg (210 lb 9.6 oz)   BMI 32.02 kg/m²     Physical Exam      DATA  Result Review :                XR Chest 2 View (03/02/2023 08:36)      IMPRESSION:  1. Persistent and unchanged right lower lung atelectasis.  2. Chronic inflammatory and atelectatic changes in both lungs. No  change.  3. No active cardiopulmonary " disease.  This report was finalized on 03/02/2023 08:41 by Dr. Brianna Campbell MD.                   ASSESSMENT AND PLAN          Problem List Items Addressed This Visit        Hematology and Neoplasia    Renal cell carcinoma (HCC) - Primary    Relevant Orders    CT Abdomen With Contrast    Comprehensive Metabolic Panel    CBC Auto Differential    XR Chest 2 View   Other Visit Diagnoses     Nocturia            He has no evidence recurrent disease.  We discussed further imaging since he is now over 5 years out of the nephrectomy.  He would like me to continue cross-sectional abdominal imaging.  I will obtain a CT of the abdomen with contrast as well as chest x-ray, CBC and CMP.  This decision is based on the following guidelines    NCCN guidelines for follow-up nephrectomy  In patients who have undergone partial or radical nephrectomy for stage pT1a or pT1b RCC, the NCCN recommends the following:  Complete history and physical examination annually  Laboratory tests annually, as clinically indicated  Abdominal imaging: Baseline abdominal CT or MRI (preferred), or US within 3-12 mo of surgery, then annually for 3 y or longer as clinically indicated; a more rigorous imaging schedule or technique modality can be considered in cases with positive margins or adverse pathologic features (eg, sarcomatoid, high-grade [grade 3/4], positive margins)  Chest imaging: Chest x-ray or CT annually for at least 5 y, then as clinically indicated. A more rigorous imaging schedule or technique modality can be considered in cases with positive margins or adverse pathologic features [19]    AUA Guidelines  For patients with low risk renal cell carcinoma based on pT1 disease that is grade 1 or 5:02 years, informed shared decision making should dictate further abdominal imaging.  Informed/shared decision-making discussion should also dictate further chest imaging.    Nocturia is explained to the patient is a manifestation of one the  following or a combination of voiding dysfunction, other medical conditions, or a sleep disorder.  Nocturia as a completely isolated symptommore often represents a non-urologic problem or medical condition.  It is explained that as patient's age, they often have a reverse Circadian rhythm voiding pattern in which up to two thirds of the urine in a 24-hour period can be excreted at night.  We also went over sleep disorders of the patient which may affect them.  Volume-related disorders the cause mobilization of peripheral edema are also possible causes of nocturia including the medications used to treat them.  Therefore, treatment must be directed at the cause of the symptom.  I explained we will do our best to evaluate any urologic cause of symptoms, but oftentimes we find no abnormality in voiding dysfunction to correct.  Evaluation options are explained to the patient.  Patient is currently on both tamsulosin and alfuzosin.  I explained that to alpha blockers is a redundant therapy would recommend that he discontinue one of them.  He is going to stop the alfuzosin.          FOLLOW UP     Return in about 1 year (around 3/9/2024) for with ct renal mass protocol, CBC, CMP, CXR before next visit.        (Please note that portions of this note were completed with a voice recognition program.)  Kodi Burrell MD  03/09/23  16:26 CST

## 2023-03-09 ENCOUNTER — OFFICE VISIT (OUTPATIENT)
Dept: UROLOGY | Facility: CLINIC | Age: 78
End: 2023-03-09
Payer: MEDICARE

## 2023-03-09 VITALS — TEMPERATURE: 97.1 F | HEIGHT: 68 IN | BODY MASS INDEX: 31.92 KG/M2 | WEIGHT: 210.6 LBS

## 2023-03-09 DIAGNOSIS — C64.2 RENAL CELL CARCINOMA OF LEFT KIDNEY: Primary | ICD-10-CM

## 2023-03-09 DIAGNOSIS — R35.1 NOCTURIA: ICD-10-CM

## 2023-03-09 PROCEDURE — 99214 OFFICE O/P EST MOD 30 MIN: CPT | Performed by: UROLOGY

## 2023-03-09 PROCEDURE — 1159F MED LIST DOCD IN RCRD: CPT | Performed by: UROLOGY

## 2023-03-09 PROCEDURE — 1160F RVW MEDS BY RX/DR IN RCRD: CPT | Performed by: UROLOGY

## 2023-03-09 RX ORDER — TAMSULOSIN HYDROCHLORIDE 0.4 MG/1
1 CAPSULE ORAL DAILY
Qty: 90 CAPSULE | Refills: 3 | Status: SHIPPED | OUTPATIENT
Start: 2023-03-09

## 2023-03-09 RX ORDER — ALFUZOSIN HYDROCHLORIDE 10 MG/1
10 TABLET, EXTENDED RELEASE ORAL DAILY
Qty: 90 TABLET | Refills: 3 | Status: SHIPPED | OUTPATIENT
Start: 2023-03-09

## 2023-03-24 RX ORDER — CLOPIDOGREL BISULFATE 75 MG/1
TABLET ORAL
Qty: 90 TABLET | Refills: 3 | Status: SHIPPED | OUTPATIENT
Start: 2023-03-24

## 2023-04-17 ENCOUNTER — OFFICE VISIT (OUTPATIENT)
Dept: OTOLARYNGOLOGY | Facility: CLINIC | Age: 78
End: 2023-04-17
Payer: MEDICARE

## 2023-04-17 VITALS — HEART RATE: 79 BPM | DIASTOLIC BLOOD PRESSURE: 68 MMHG | SYSTOLIC BLOOD PRESSURE: 136 MMHG | TEMPERATURE: 97.8 F

## 2023-04-17 DIAGNOSIS — H61.22 IMPACTED CERUMEN OF LEFT EAR: Primary | ICD-10-CM

## 2023-04-17 DIAGNOSIS — H91.93 BILATERAL HEARING LOSS, UNSPECIFIED HEARING LOSS TYPE: ICD-10-CM

## 2023-04-17 PROCEDURE — 1160F RVW MEDS BY RX/DR IN RCRD: CPT | Performed by: NURSE PRACTITIONER

## 2023-04-17 PROCEDURE — 1159F MED LIST DOCD IN RCRD: CPT | Performed by: NURSE PRACTITIONER

## 2023-04-17 PROCEDURE — 69210 REMOVE IMPACTED EAR WAX UNI: CPT | Performed by: NURSE PRACTITIONER

## 2023-04-17 PROCEDURE — 3078F DIAST BP <80 MM HG: CPT | Performed by: NURSE PRACTITIONER

## 2023-04-17 PROCEDURE — 3075F SYST BP GE 130 - 139MM HG: CPT | Performed by: NURSE PRACTITIONER

## 2023-04-17 RX ORDER — HYDROCORTISONE AND ACETIC ACID 20.75; 10.375 MG/ML; MG/ML
4 SOLUTION AURICULAR (OTIC) 2 TIMES WEEKLY
Qty: 10 ML | Refills: 5 | Status: SHIPPED | OUTPATIENT
Start: 2023-04-17 | End: 2023-04-24

## 2023-04-17 NOTE — PROGRESS NOTES
DONA Crump  [facility] EAR NOSE & THROAT  [address], [address]  [place] [address]  Fax [phone]  Phone [phone]      Visit Type: FOLLOW UP   Chief Complaint   Patient presents with   • Cerumen Impaction     Follow up on ear cleaning        HPI  Marika Gutierrez is a 77 y.o. male who presents for follow-up evaluation.  He states his left ear feels stopped up.  He also has complaints of feeling like something is crawling in his right ear.  He has a longstanding history of hearing loss of both ears and is interested in new hearing aids.      Past Medical History:   Diagnosis Date   • Aortic valve regurgitation    • Arthritis 2000   • Asthma COPD   • Cancer    • Carotid stenosis    • Cataract     right   • COPD (chronic obstructive pulmonary disease)    • Coronary artery disease    • Depression 2011   • GERD (gastroesophageal reflux disease)    • Hard of hearing    • Hyperlipidemia    • Hypertension    • PONV (postoperative nausea and vomiting)    • Renal cell carcinoma    • TIA (transient ischemic attack)        Past Surgical History:   Procedure Laterality Date   • BACK SURGERY     • CARDIAC CATHETERIZATION      X 4 - 2010 - 2005 - 2003 - 1996   • CAROTID ENDARTERECTOMY Left 08/18/2021    Procedure: LEFT CAROTID ENDARTERECTOMY WITH EEG;  Surgeon: Puneet Chang DO;  Location: [facility] OR ;  Service: Vascular;  Laterality: Left;   • CAROTID ENDARTERECTOMY Right 10/18/2021    Procedure: RIGHT CAROTID ENDARTERECTOMY WITH EEG;  Surgeon: Puneet Chang DO;  Location: [facility] HYBRID OR 12;  Service: Vascular;  Laterality: Right;   • CORONARY ARTERY BYPASS GRAFT      X 4   • EXTERNAL EAR SURGERY     • NEPHRECTOMY      LEFT - 2017       Family History: His family history includes Asthma in his father; Heart attack in his father.     Social History: He  reports that he quit smoking about 19 years ago. His smoking use included  cigarettes. He has a 40.50 pack-year smoking history. He has quit using smokeless tobacco.  His smokeless tobacco use included chew. He reports that he does not currently use alcohol. He reports that he does not use drugs.    Home Medications:  acetic acid-hydrocortisone, alfuzosin, amLODIPine, atorvastatin, citalopram, clopidogrel, diazePAM, hydrALAZINE, isosorbide mononitrate, nitroglycerin, pantoprazole, predniSONE, tamsulosin, and triamcinolone    Allergies:  He is allergic to cyclobenzaprine, hydrocodone-acetaminophen, and pentazocine.       Vital Signs:   Temp:  [97.8 °F (36.6 °C)] 97.8 °F (36.6 °C)  Heart Rate:  [79] 79  BP: (136)/(68) 136/68  ENT Physical Exam  Constitutional  Appearance: patient appears well-developed, well-nourished and well-groomed,  Communication/Voice: communication appropriate for developmental age; vocal quality normal;  Head and Face  Appearance: head appears normal and face appears atraumatic;  Ear  Hearing: impaired to conversational voice;  Auricles: right auricle normal; left auricle normal;  External Mastoids: right external mastoid normal; left external mastoid normal;  Ear Canals: right ear canal normal;  Tympanic Membranes: right tympanic membrane normal; left tympanic membrane normal;  Ear comments: Right EAC is clear without cerumen impaction.  Left EAC with excessive cerumen and moisture in the ear canal-this was suctioned under microscopy.  No fungal debris was noted.  Respiratory  Inspection: breathing unlabored;  Neurovestibular  Mental Status: alert and oriented;  Psychiatric: mood normal; affect is appropriate;       PROCEDURE NOTE    LOCATION: [facility]  PROVIDER: SABINE Jamison   PREOPERATIVE DIAGNOSIS: Cerumen Impaction bilaterally   POSTOPERATIVE DIAGNOSIS: Same  PROCEDURE: Cerumen removal  ANESTHESIA: None   REASON FOR THE OPERATION: The patient verbally consented to intervention after a full discussion of risks, benefits, and alternatives. No guarantees  were made or implied.   DETAILS OF THE OPERATION: The patient was reclined in the procedure room chair. The binocular microscope was used to visualize the ear canal and tympanic membrane. Cerumen was then removed from the left ear using a suction. Findings: See associated exam findings above. Patient tolerated procedure well and was without complications        Result Review    RESULTS REVIEW    I have reviewed the patients old records in the chart.    Assessment & Plan    Diagnoses and all orders for this visit:    1. Impacted cerumen of left ear (Primary)    2. Bilateral hearing loss, unspecified hearing loss type    Other orders  -     acetic acid-hydrocortisone (VOSOL-HC) 1-2 % otic solution; Administer 4 drops into the left ear 2 (Two) Times a Week for 7 days.  Dispense: 10 mL; Refill: 5       Left cerumen impaction removed under microscopy.  See procedure note.   Dry ear precautions.   Start VoSol drops.   Use hearing protection in loud noise situations.  He is interested in obtaining new hearing aids.  He will call [facility] for an appointment.    Return in about 6 months (around 10/17/2023), or if symptoms worsen or fail to improve, for Recheck.      Leana Peralta, APRN  04/17/23  17:54 CDT

## 2023-04-18 ENCOUNTER — TELEPHONE (OUTPATIENT)
Dept: OTOLARYNGOLOGY | Facility: CLINIC | Age: 78
End: 2023-04-18
Payer: MEDICARE

## 2023-04-18 NOTE — TELEPHONE ENCOUNTER
[facility] received a fax that requires your attention. The document has been indexed to the patient's chart for your review.    Reason for sending: REQUIRES PHYSICIAN REVIEW AND ACTION    Documents Description: PRESCRIPTION REJECTED AND REQUIRES PRIOR AUTH OR CHANGE TO COVERED FORMULARY ALTERNATIVE    Name of Sender: [employer]    Date Indexed: 04/18/23

## 2023-08-08 RX ORDER — HYDRALAZINE HYDROCHLORIDE 50 MG/1
TABLET, FILM COATED ORAL
Qty: 180 TABLET | Refills: 3 | Status: SHIPPED | OUTPATIENT
Start: 2023-08-08

## 2023-08-10 ENCOUNTER — TELEPHONE (OUTPATIENT)
Dept: VASCULAR SURGERY | Facility: CLINIC | Age: 78
End: 2023-08-10
Payer: MEDICARE

## 2023-08-11 ENCOUNTER — HOSPITAL ENCOUNTER (OUTPATIENT)
Dept: ULTRASOUND IMAGING | Facility: HOSPITAL | Age: 78
Discharge: HOME | End: 2023-08-11
Payer: MEDICARE

## 2023-08-11 ENCOUNTER — OFFICE VISIT (OUTPATIENT)
Dept: VASCULAR SURGERY | Facility: CLINIC | Age: 78
End: 2023-08-11
Payer: MEDICARE

## 2023-08-11 VITALS
OXYGEN SATURATION: 97 % | SYSTOLIC BLOOD PRESSURE: 152 MMHG | DIASTOLIC BLOOD PRESSURE: 70 MMHG | BODY MASS INDEX: 30.46 KG/M2 | HEIGHT: 68 IN | HEART RATE: 75 BPM | WEIGHT: 201 LBS

## 2023-08-11 DIAGNOSIS — E78.2 MIXED HYPERLIPIDEMIA: ICD-10-CM

## 2023-08-11 DIAGNOSIS — I65.23 BILATERAL CAROTID ARTERY STENOSIS: Primary | ICD-10-CM

## 2023-08-11 DIAGNOSIS — I10 BENIGN ESSENTIAL HTN: ICD-10-CM

## 2023-08-11 DIAGNOSIS — I65.23 BILATERAL CAROTID ARTERY STENOSIS: ICD-10-CM

## 2023-08-11 PROCEDURE — 3078F DIAST BP <80 MM HG: CPT | Performed by: NURSE PRACTITIONER

## 2023-08-11 PROCEDURE — 1160F RVW MEDS BY RX/DR IN RCRD: CPT | Performed by: NURSE PRACTITIONER

## 2023-08-11 PROCEDURE — 1159F MED LIST DOCD IN RCRD: CPT | Performed by: NURSE PRACTITIONER

## 2023-08-11 PROCEDURE — 99214 OFFICE O/P EST MOD 30 MIN: CPT | Performed by: NURSE PRACTITIONER

## 2023-08-11 PROCEDURE — 93880 EXTRACRANIAL BILAT STUDY: CPT

## 2023-08-11 PROCEDURE — 3077F SYST BP >= 140 MM HG: CPT | Performed by: NURSE PRACTITIONER

## 2023-08-11 NOTE — LETTER
August 13, 2023     Abilio Martinez MD  [address] [facility] [address]    Patient: Marika Gutierrez   YOB: 1945   Date of Visit: 8/11/2023     Dear Abilio Martinez MD:       Thank you for referring Marika Gutierrez to me for evaluation. Below are the relevant portions of my assessment and plan of care.    If you have questions, please do not hesitate to call me. I look forward to following Marika along with you.         Sincerely,        DONA Méndez        CC: No Recipients    Faith Wells APRN  08/13/23 1722  Sign when Signing Visit  08/11/2023       Abilio Martinez MD  [address] [facility] [address]    Marika Gutierrez  1945    Chief Complaint   Patient presents with    Follow-up     6 Month follow up with testing for Carotid. Pt was last seen on 2.10.23. pt denies any stroke like symptoms or any other issues in the past months.        Dear Abilio Martinez MD    HPI  I had the pleasure of seeing your patient Marika Gutierrez in the office today.    As you recall, Marika Gutierrez is a 77 y.o.  male who we are following for carotid occlusive disease.  He did undergo a left carotid endarterectomy on 8/18/2021 and a right carotid on 10/18/2021.  Overall he has done well but last week he states he fell asleep in his chair they woke up and could not come up with the correct words.  This did resolve within minutes.  Since that time he has done well and reports no further strokelike symptoms.  He is maintained on Plavix and Lipitor.  He did have noninvasive testing performed today, which I did review in office.      Review of Systems   Constitutional: Negative.    HENT: Negative.     Eyes: Negative.    Respiratory: Negative.     Cardiovascular: Negative.    Gastrointestinal: Negative.    Endocrine: Negative.    Genitourinary: Negative.    Musculoskeletal: Negative.    Skin: Negative.    Allergic/Immunologic: Negative.   "  Neurological: Negative.    Hematological: Negative.    Psychiatric/Behavioral: Negative.     All other systems reviewed and are negative.       /70   Pulse 75   Ht 172.7 cm (68\")   Wt 91.2 kg (201 lb)   SpO2 97%   BMI 30.56 kg/mý   Physical Exam  Vitals and nursing note reviewed.   Constitutional:       General: He is not in acute distress.     Appearance: Normal appearance. He is well-developed. He is not diaphoretic.   HENT:      Head: Normocephalic and atraumatic.   Neck:      Vascular: No carotid bruit or JVD.   Cardiovascular:      Rate and Rhythm: Normal rate and regular rhythm.      Pulses: Normal pulses.           Femoral pulses are 2+ on the right side and 2+ on the left side.       Popliteal pulses are 2+ on the right side and 2+ on the left side.        Dorsalis pedis pulses are 2+ on the right side and 2+ on the left side.        Posterior tibial pulses are 2+ on the right side and 2+ on the left side.      Heart sounds: S1 normal and S2 normal. Murmur heard.     No friction rub. No gallop.   Pulmonary:      Effort: Pulmonary effort is normal.      Breath sounds: Normal breath sounds.   Abdominal:      General: Bowel sounds are normal. There is no abdominal bruit.      Palpations: Abdomen is soft.      Tenderness: There is no abdominal tenderness.   Musculoskeletal:         General: Normal range of motion.   Skin:     General: Skin is warm and dry.   Neurological:      General: No focal deficit present.      Mental Status: He is alert and oriented to person, place, and time.      Cranial Nerves: No cranial nerve deficit.   Psychiatric:         Mood and Affect: Mood normal.         Behavior: Behavior normal.         Thought Content: Thought content normal.         Judgment: Judgment normal.           Diagnostic Data:  US Carotid Bilateral    Result Date: 8/11/2023  Narrative: History: Carotid occlusive disease      Impression: Impression: 1. There is 50-69% stenosis of the right internal " carotid artery. 2. There is 50-69% stenosis of the left internal carotid artery. 3. Antegrade flow is demonstrated in bilateral vertebral arteries.  Comments: Bilateral carotid vertebral arterial duplex scan was performed.  Grayscale imaging shows intimal thickening and calcified elements at the carotid bifurcation. The right internal carotid artery peak systolic velocity is 129.5 cm/sec. The end-diastolic velocity is 21.3 cm/sec. The right ICA/CCA ratio is approximately 1.0 . These findings correlate with 50-69% stenosis of the right internal carotid artery.  Grayscale imaging shows intimal thickening and calcified elements at the carotid bifurcation. The left internal carotid artery peak systolic velocity is 139.5 cm/sec. The end-diastolic velocity is 32.7 cm/sec. The left ICA/CCA ratio is approximately 1.4 . These findings correlate with 50-69% stenosis of the left internal carotid artery.  Antegrade flow is demonstrated in bilateral vertebral arteries. There is greater than 50% stenosis of the right common carotid artery and bilateral external carotid arteries. This report was finalized on 08/11/2023 15:36 by Dr. Puneet Chang MD.      Patient Active Problem List   Diagnosis    Coronary artery disease involving native coronary artery of native heart without angina pectoris    S/P CABG (coronary artery bypass graft) 1996 [place]     Mixed hyperlipidemia    Benign essential HTN    Family history of early CAD    Ex-smoker for more than 1 year 1PPD x 40 years quit 16 years ago    Nonrheumatic aortic valve insufficiency    Class 1 obesity due to excess calories with serious comorbidity and body mass index (BMI) of 31.0 to 31.9 in adult    Bilateral carotid artery stenosis    Preop testing    Stenosis of right carotid artery    Carotid stenosis, right    Abnormal laboratory test    Arteriosclerosis of arterial coronary artery bypass graft    Arthralgia    Benign prostatic hyperplasia with urinary  obstruction    Cardiovascular system problem    Carotid artery occlusion    DDD (degenerative disc disease), lumbosacral    Inflammatory polyarthropathy    Intermittent claudication    Osteoarthritis of knee    Palpitations    Renal cell carcinoma    Tendinitis    BMI 31.0-31.9,adult    Abnormal nuclear stress test infarction no ischemia         Diagnosis Plan   1. Bilateral carotid artery stenosis  US Carotid Bilateral      2. Mixed hyperlipidemia        3. Benign essential HTN            Plan: After thoroughly evaluating Marika Gutierrez, I believe the best course of action is to remain conservative from vascular surgery standpoint.  Currently he is doing well and denies any strokelike symptoms.  I did review his testing which shows 50 to 69% carotid stenosis bilaterally.  We will see him back in 1 year with repeat noninvasive testing including a carotid duplex for continued surveillance.  I did discuss vascular risk factors as they pertain to the progression of vascular disease including controlling his hypertension and hyperlipidemia.  His blood pressure is elevated in office today at 152/70.  He should continue taking his Plavix 75 mg daily and Lipitor 10 mg daily in addition to his other medications.  This was all discussed in full with complete understanding.  Thank you for allowing me to participate in the care of your patient.  Please do not hesitate to call with any questions or concerns.  We will keep you aware of any further encounters with Marika Gutierrez.        Sincerely yours,         DONA Méndez, Abilio Solis MD

## 2023-08-11 NOTE — PROGRESS NOTES
"08/11/2023       Abilio Martinez MD  [address] [facility] [address]    Marika Gutierrez  1945    Chief Complaint   Patient presents with    Follow-up     6 Month follow up with testing for Carotid. Pt was last seen on 2.10.23. pt denies any stroke like symptoms or any other issues in the past months.        Dear Abilio Martinez MD    HPI  I had the pleasure of seeing your patient Marika Gutierrez in the office today.    As you recall, Marika Gutierrez is a 77 y.o.  male who we are following for carotid occlusive disease.  He did undergo a left carotid endarterectomy on 8/18/2021 and a right carotid on 10/18/2021.  Overall he has done well but last week he states he fell asleep in his chair they woke up and could not come up with the correct words.  This did resolve within minutes.  Since that time he has done well and reports no further strokelike symptoms.  He is maintained on Plavix and Lipitor.  He did have noninvasive testing performed today, which I did review in office.      Review of Systems   Constitutional: Negative.    HENT: Negative.     Eyes: Negative.    Respiratory: Negative.     Cardiovascular: Negative.    Gastrointestinal: Negative.    Endocrine: Negative.    Genitourinary: Negative.    Musculoskeletal: Negative.    Skin: Negative.    Allergic/Immunologic: Negative.    Neurological: Negative.    Hematological: Negative.    Psychiatric/Behavioral: Negative.     All other systems reviewed and are negative.       /70   Pulse 75   Ht 172.7 cm (68\")   Wt 91.2 kg (201 lb)   SpO2 97%   BMI 30.56 kg/mý   Physical Exam  Vitals and nursing note reviewed.   Constitutional:       General: He is not in acute distress.     Appearance: Normal appearance. He is well-developed. He is not diaphoretic.   HENT:      Head: Normocephalic and atraumatic.   Neck:      Vascular: No carotid bruit or JVD.   Cardiovascular:      Rate and Rhythm: Normal rate and regular rhythm.      " Pulses: Normal pulses.           Femoral pulses are 2+ on the right side and 2+ on the left side.       Popliteal pulses are 2+ on the right side and 2+ on the left side.        Dorsalis pedis pulses are 2+ on the right side and 2+ on the left side.        Posterior tibial pulses are 2+ on the right side and 2+ on the left side.      Heart sounds: S1 normal and S2 normal. Murmur heard.     No friction rub. No gallop.   Pulmonary:      Effort: Pulmonary effort is normal.      Breath sounds: Normal breath sounds.   Abdominal:      General: Bowel sounds are normal. There is no abdominal bruit.      Palpations: Abdomen is soft.      Tenderness: There is no abdominal tenderness.   Musculoskeletal:         General: Normal range of motion.   Skin:     General: Skin is warm and dry.   Neurological:      General: No focal deficit present.      Mental Status: He is alert and oriented to person, place, and time.      Cranial Nerves: No cranial nerve deficit.   Psychiatric:         Mood and Affect: Mood normal.         Behavior: Behavior normal.         Thought Content: Thought content normal.         Judgment: Judgment normal.           Diagnostic Data:  US Carotid Bilateral    Result Date: 8/11/2023  Narrative: History: Carotid occlusive disease      Impression: Impression: 1. There is 50-69% stenosis of the right internal carotid artery. 2. There is 50-69% stenosis of the left internal carotid artery. 3. Antegrade flow is demonstrated in bilateral vertebral arteries.  Comments: Bilateral carotid vertebral arterial duplex scan was performed.  Grayscale imaging shows intimal thickening and calcified elements at the carotid bifurcation. The right internal carotid artery peak systolic velocity is 129.5 cm/sec. The end-diastolic velocity is 21.3 cm/sec. The right ICA/CCA ratio is approximately 1.0 . These findings correlate with 50-69% stenosis of the right internal carotid artery.  Grayscale imaging shows intimal thickening and  calcified elements at the carotid bifurcation. The left internal carotid artery peak systolic velocity is 139.5 cm/sec. The end-diastolic velocity is 32.7 cm/sec. The left ICA/CCA ratio is approximately 1.4 . These findings correlate with 50-69% stenosis of the left internal carotid artery.  Antegrade flow is demonstrated in bilateral vertebral arteries. There is greater than 50% stenosis of the right common carotid artery and bilateral external carotid arteries. This report was finalized on 08/11/2023 15:36 by Dr. Puneet Chang MD.      Patient Active Problem List   Diagnosis    Coronary artery disease involving native coronary artery of native heart without angina pectoris    S/P CABG (coronary artery bypass graft) 1996 [place]     Mixed hyperlipidemia    Benign essential HTN    Family history of early CAD    Ex-smoker for more than 1 year 1PPD x 40 years quit 16 years ago    Nonrheumatic aortic valve insufficiency    Class 1 obesity due to excess calories with serious comorbidity and body mass index (BMI) of 31.0 to 31.9 in adult    Bilateral carotid artery stenosis    Preop testing    Stenosis of right carotid artery    Carotid stenosis, right    Abnormal laboratory test    Arteriosclerosis of arterial coronary artery bypass graft    Arthralgia    Benign prostatic hyperplasia with urinary obstruction    Cardiovascular system problem    Carotid artery occlusion    DDD (degenerative disc disease), lumbosacral    Inflammatory polyarthropathy    Intermittent claudication    Osteoarthritis of knee    Palpitations    Renal cell carcinoma    Tendinitis    BMI 31.0-31.9,adult    Abnormal nuclear stress test infarction no ischemia         Diagnosis Plan   1. Bilateral carotid artery stenosis  US Carotid Bilateral      2. Mixed hyperlipidemia        3. Benign essential HTN            Plan: After thoroughly evaluating Marika Gutierrez, I believe the best course of action is to remain conservative from vascular  surgery standpoint.  Currently he is doing well and denies any strokelike symptoms.  I did review his testing which shows 50 to 69% carotid stenosis bilaterally.  We will see him back in 1 year with repeat noninvasive testing including a carotid duplex for continued surveillance.  I did discuss vascular risk factors as they pertain to the progression of vascular disease including controlling his hypertension and hyperlipidemia.  His blood pressure is elevated in office today at 152/70.  He should continue taking his Plavix 75 mg daily and Lipitor 10 mg daily in addition to his other medications.  This was all discussed in full with complete understanding.  Thank you for allowing me to participate in the care of your patient.  Please do not hesitate to call with any questions or concerns.  We will keep you aware of any further encounters with Marika Gutierrez.        Sincerely yours,         DONA Méndez Jonathan Patmor, MD

## 2023-08-14 ENCOUNTER — OFFICE VISIT (OUTPATIENT)
Dept: CARDIOLOGY | Facility: CLINIC | Age: 78
End: 2023-08-14
Payer: MEDICARE

## 2023-08-14 VITALS
BODY MASS INDEX: 30.62 KG/M2 | RESPIRATION RATE: 18 BRPM | OXYGEN SATURATION: 98 % | DIASTOLIC BLOOD PRESSURE: 78 MMHG | SYSTOLIC BLOOD PRESSURE: 140 MMHG | HEIGHT: 68 IN | WEIGHT: 202 LBS | HEART RATE: 78 BPM

## 2023-08-14 DIAGNOSIS — I27.20 MODERATE PULMONARY HYPERTENSION: ICD-10-CM

## 2023-08-14 DIAGNOSIS — E78.2 MIXED HYPERLIPIDEMIA: ICD-10-CM

## 2023-08-14 DIAGNOSIS — I65.23 BILATERAL CAROTID ARTERY STENOSIS: ICD-10-CM

## 2023-08-14 DIAGNOSIS — I25.10 CORONARY ARTERY DISEASE INVOLVING NATIVE CORONARY ARTERY OF NATIVE HEART WITHOUT ANGINA PECTORIS: Primary | ICD-10-CM

## 2023-08-14 DIAGNOSIS — I35.1 NONRHEUMATIC AORTIC VALVE INSUFFICIENCY: ICD-10-CM

## 2023-08-14 DIAGNOSIS — I10 BENIGN ESSENTIAL HTN: ICD-10-CM

## 2023-08-14 PROCEDURE — 3077F SYST BP >= 140 MM HG: CPT | Performed by: NURSE PRACTITIONER

## 2023-08-14 PROCEDURE — 99214 OFFICE O/P EST MOD 30 MIN: CPT | Performed by: NURSE PRACTITIONER

## 2023-08-14 PROCEDURE — 3078F DIAST BP <80 MM HG: CPT | Performed by: NURSE PRACTITIONER

## 2023-08-14 NOTE — PROGRESS NOTES
"Subjective:     Encounter Date:08/14/2023      Patient ID: Marika Gutierrez is a 77 y.o. male     Chief Complaint:  History of Present Illness  Patient presents today for routine cardiology follow up. Patient is followed for coronary artery disease with remote CABG in 1996 in [facility] [place], moderate AI, Hypertension and hyperlipemia. He follows with vascular for bilateral carotid artery disease. His PCP is Dr. Martinez. He also has renal cell carcinoma, pulmonary hypertension, COPD and obesity. Overall patient is stable. He has some chronic shortness of breath which is at baseline. He notes it does not limit him. He notes he saw a pulmonologist many years ago but did not tolerate the inhaler. He notes he worked in the [employer] and has been told he had \"black lungs.\" He is on Lipitor 10 he cannot recall being on a high dose. No recent lipid panel available. He does note rare chest pain.     The following portions of the patient's history were reviewed and updated as appropriate: allergies, current medications, past medical history, past social history, past and problem list.    Allergies   Allergen Reactions    Cyclobenzaprine Headache     Severe headache      Hydrocodone-Acetaminophen Nausea And Vomiting    Pentazocine Palpitations       Current Outpatient Medications:     alfuzosin (UROXATRAL) 10 MG 24 hr tablet, Take 1 tablet by mouth Daily., Disp: 90 tablet, Rfl: 3    amLODIPine (NORVASC) 10 MG tablet, TAKE 1 TABLET DAILY, Disp: 90 tablet, Rfl: 3    atorvastatin (LIPITOR) 10 MG tablet, Take 1 tablet by mouth Daily., Disp: 90 tablet, Rfl: 4    citalopram (CeleXA) 20 MG tablet, Take 1 tablet by mouth Daily., Disp: , Rfl:     clopidogrel (PLAVIX) 75 MG tablet, TAKE 1 TABLET DAILY, Disp: 90 tablet, Rfl: 3    diazePAM (VALIUM) 5 MG tablet, Take 1 tablet by mouth Every Night., Disp: , Rfl:     hydrALAZINE (APRESOLINE) 50 MG tablet, TAKE 1 TABLET TWICE A DAY, Disp: 180 tablet, Rfl: 3    isosorbide mononitrate (IMDUR) " 60 MG 24 hr tablet, TAKE 1 TABLET DAILY, Disp: 90 tablet, Rfl: 3    nitroglycerin (NITROSTAT) 0.4 MG SL tablet, Place 1 tablet under the tongue Every 5 (Five) Minutes As Needed., Disp: , Rfl:     pantoprazole (PROTONIX) 40 MG EC tablet, Take 1 tablet by mouth Daily., Disp: , Rfl:     predniSONE (DELTASONE) 5 MG tablet, Take 1 tablet by mouth Take As Directed. Every other week, currently off of, Disp: , Rfl:     tamsulosin (FLOMAX) 0.4 MG capsule 24 hr capsule, Take 1 capsule by mouth Daily., Disp: 90 capsule, Rfl: 3    triamcinolone (KENALOG) 0.1 % cream, Daily As Needed., Disp: , Rfl:     Social History     Socioeconomic History    Marital status: Single   Tobacco Use    Smoking status: Former     Packs/day: 1.50     Years: 27.00     Pack years: 40.50     Types: Cigarettes     Quit date:      Years since quittin.6    Smokeless tobacco: Former     Types: Chew    Tobacco comments:     Stop 17 years ago   Vaping Use    Vaping Use: Never used   Substance and Sexual Activity    Alcohol use: Not Currently     Comment: None    Drug use: Never    Sexual activity: Not Currently     Partners: Female     Birth control/protection: None, Hysterectomy       Review of Systems   Constitutional: Positive for malaise/fatigue. Negative for chills, decreased appetite, fever, weight gain and weight loss.   HENT:  Negative for nosebleeds.    Eyes:  Negative for visual disturbance.   Cardiovascular:  Positive for chest pain (rare) and dyspnea on exertion. Negative for leg swelling, near-syncope, orthopnea, palpitations, paroxysmal nocturnal dyspnea and syncope.   Respiratory:  Positive for shortness of breath. Negative for cough, hemoptysis and snoring.    Endocrine: Negative for cold intolerance and heat intolerance.   Hematologic/Lymphatic: Negative for bleeding problem. Does not bruise/bleed easily.   Skin:  Negative for rash.   Musculoskeletal:  Negative for back pain and falls.   Gastrointestinal:  Negative for abdominal  "pain, constipation, diarrhea, heartburn, melena, nausea and vomiting.   Genitourinary:  Negative for hematuria.   Neurological:  Negative for dizziness, headaches and light-headedness.   Psychiatric/Behavioral:  Negative for altered mental status.    Allergic/Immunologic: Negative for persistent infections.            Objective:     Constitutional:       Appearance: Healthy appearance. Well-developed and not in distress.   Eyes:      Pupils: Pupils are equal, round, and reactive to light.   HENT:      Head: Normocephalic and atraumatic.   Neck:      Vascular: No carotid bruit or JVD.   Pulmonary:      Effort: Pulmonary effort is normal.      Breath sounds: Normal breath sounds.   Cardiovascular:      Normal rate. Regular rhythm.      Murmurs: There is a diastolic murmur.   Pulses:     Intact distal pulses.   Edema:     Peripheral edema absent.   Abdominal:      General: Bowel sounds are normal.      Palpations: Abdomen is soft.   Musculoskeletal: Normal range of motion.      Cervical back: Normal range of motion and neck supple. Skin:     General: Skin is warm and dry.   Neurological:      Mental Status: Alert and oriented to person, place, and time.      Deep Tendon Reflexes: Reflexes are normal and symmetric.   Psychiatric:         Behavior: Behavior normal.         Thought Content: Thought content normal.         Judgment: Judgment normal.         Procedures  /78   Pulse 78   Resp 18   Ht 172.7 cm (68\")   Wt 91.6 kg (202 lb)   SpO2 98%   BMI 30.71 kg/mý   Lab Review:   I have reviewed       No results found for: CHOL, CHLPL, TRIG, HDL, LDL, LDLDIRECT   Results for orders placed during the hospital encounter of 11/28/22    Adult Transthoracic Echo Complete w/ Color, Spectral and Contrast if necessary per protocol    Interpretation Summary    Left ventricular systolic function is normal. Left ventricular ejection fraction appears to be 61 - 65%.    Left ventricular wall thickness is consistent with mild " "concentric hypertrophy.    Abnormal global longitudinal LV strain (GLS) = -6.6%    Left ventricular diastolic function was normal.    Moderate aortic valve regurgitation is present.    Moderate pulmonary hypertension is present.    Aortic regurgitation similar to prior echo dated 11/30/2021     Assessment:          Diagnosis Plan   1. Coronary artery disease involving native coronary artery of native heart without angina pectoris        2. Mixed hyperlipidemia        3. Benign essential HTN        4. Nonrheumatic aortic valve insufficiency        5. Bilateral carotid artery stenosis        6. Moderate pulmonary hypertension               Plan:       Coronary Artery Disease with remote CABG - stable angina. On Imdur. On Plavix. Will request last lipid panel. On Lipitor 10 mg - cannot recall being on a higher dose.   Hyperlipidemia - on Lipitor 10. Goal LDL less than 70. Request last lipid panel.   Hypertension - blood pressure controlled.   Moderate Aortic Regurgitation - will monitor. Last echo in November.   Bilateral carotid artery disease- follows with Vascular - recent scan. On Plavix and Lipitor.   Moderate Pulmonary Hypertension - discussed with patient. Would consider referral to pulmonary for pulmonary hypertension, COPD and \"black lung\". However he notes he is feeling fine and is not interested. Follow up with PCP.            I spent 30 minutes caring for Marika on this date of service. This time includes time spent by me in the following activities:preparing for the visit, reviewing tests, obtaining and/or reviewing a separately obtained history, performing a medically appropriate examination and/or evaluation , counseling and educating the patient/family/caregiver, ordering medications, tests, or procedures, referring and communicating with other health care professionals , documenting information in the medical record, independently interpreting results and communicating that information with the " patient/family/caregiver, and care coordination

## 2023-08-28 ENCOUNTER — PATIENT MESSAGE (OUTPATIENT)
Dept: CARDIOLOGY | Facility: CLINIC | Age: 78
End: 2023-08-28
Payer: MEDICARE

## 2023-08-28 RX ORDER — ATORVASTATIN CALCIUM 40 MG/1
40 TABLET, FILM COATED ORAL DAILY
Qty: 90 TABLET | Refills: 3 | Status: SHIPPED | OUTPATIENT
Start: 2023-08-28

## 2023-08-28 RX ORDER — ATORVASTATIN CALCIUM 40 MG/1
40 TABLET, FILM COATED ORAL DAILY
Qty: 30 TABLET | Refills: 11 | Status: SHIPPED | OUTPATIENT
Start: 2023-08-28 | End: 2023-08-28 | Stop reason: SDUPTHER

## 2023-08-28 NOTE — TELEPHONE ENCOUNTER
Sera has been notified with medication dose change and she will let Mr Gutierrez know.    Faxed new script to [facility]

## 2023-08-28 NOTE — TELEPHONE ENCOUNTER
----- Message from DONA Mcwilliams sent at 8/28/2023 12:28 PM CDT -----  LDL is 89. Goal LDL less than 70. I will send in an increase of Lipitor   ----- Message -----  From: Diann Boothe MA  Sent: 8/23/2023   2:28 PM CDT  To: DONA Mcwilliams    SCANNED UNDER MEDIA  ----- Message -----  From: Ranjit Cano APRN  Sent: 8/14/2023  10:09 AM CDT  To: Diann Boothe MA    Can you request last lipid panel.

## 2023-09-13 ENCOUNTER — OFFICE VISIT (OUTPATIENT)
Dept: OTOLARYNGOLOGY | Facility: CLINIC | Age: 78
End: 2023-09-13
Payer: MEDICARE

## 2023-09-13 DIAGNOSIS — H92.12 OTORRHEA OF LEFT EAR: Primary | ICD-10-CM

## 2023-09-13 DIAGNOSIS — B36.9 OTOMYCOSIS OF LEFT EAR: ICD-10-CM

## 2023-09-13 DIAGNOSIS — H62.42 OTOMYCOSIS OF LEFT EAR: ICD-10-CM

## 2023-09-13 RX ORDER — CLOTRIMAZOLE 1 G/ML
SOLUTION TOPICAL 2 TIMES DAILY
Qty: 10 ML | Refills: 0 | Status: SHIPPED | OUTPATIENT
Start: 2023-09-13 | End: 2023-09-23

## 2023-09-13 NOTE — PROGRESS NOTES
DONA Whaley  MGLEATHA ENT [facility] EAR NOSE & THROAT  [address], [address]  [place] [address]  Fax [phone]  Phone [phone]      Visit Type: FOLLOW UP   Chief Complaint   Patient presents with    Cerumen Impaction     Patient here for a follow up.        ROMARIO Gutierrez is a 77 y.o. male who presents for follow-up evaluation.  He states his left ear feels stopped up again. It has been draining. He describes the drainage as foul-smelling.  He was unable to get the VolSol drops filled at his pharmacy.      Past Medical History:   Diagnosis Date    Aortic valve regurgitation     Arthritis 2000    Asthma COPD    Cancer     Carotid stenosis     Cataract     right    COPD (chronic obstructive pulmonary disease)     Coronary artery disease     Depression 2011    GERD (gastroesophageal reflux disease)     Hard of hearing     Hyperlipidemia     Hypertension     PONV (postoperative nausea and vomiting)     Renal cell carcinoma     TIA (transient ischemic attack)        Past Surgical History:   Procedure Laterality Date    BACK SURGERY      CARDIAC CATHETERIZATION      X 4 - 2010 - 2005 - 2003 - 1996    CAROTID ENDARTERECTOMY Left 08/18/2021    Procedure: LEFT CAROTID ENDARTERECTOMY WITH EEG;  Surgeon: Puneet Chang DO;  Location: [facility] OR ;  Service: Vascular;  Laterality: Left;    CAROTID ENDARTERECTOMY Right 10/18/2021    Procedure: RIGHT CAROTID ENDARTERECTOMY WITH EEG;  Surgeon: Puneet Chang DO;  Location: [facility] OR ;  Service: Vascular;  Laterality: Right;    CORONARY ARTERY BYPASS GRAFT      X 4    EXTERNAL EAR SURGERY      NEPHRECTOMY      LEFT - 2017       Family History: His family history includes Asthma in his father; Heart attack in his father.     Social History: He  reports that he quit smoking about 19 years ago. His smoking use included cigarettes. He has a 40.50 pack-year smoking history. He has quit  using smokeless tobacco.  His smokeless tobacco use included chew. He reports that he does not currently use alcohol. He reports that he does not use drugs.    Home Medications:  alfuzosin, amLODIPine, atorvastatin, citalopram, clopidogrel, diazePAM, hydrALAZINE, isosorbide mononitrate, nitroglycerin, pantoprazole, predniSONE, tamsulosin, and triamcinolone    Allergies:  He is allergic to cyclobenzaprine, hydrocodone-acetaminophen, and pentazocine.       Vital Signs:    There were no vitals taken for this visit.    ENT Physical Exam  Constitutional  Appearance: patient appears well-developed, well-nourished and well-groomed,  Communication/Voice: communication appropriate for developmental age; vocal quality normal;  Head and Face  Appearance: head appears normal and face appears atraumatic;  Ear  Hearing: impaired to conversational voice;  Auricles: right auricle normal; left auricle normal;  External Mastoids: right external mastoid normal; left external mastoid normal;  Ear Canals: right ear canal normal;  Tympanic Membranes: right tympanic membrane normal; left tympanic membrane normal;  Ear comments: Right EAC is clear without cerumen impaction.  Left EAC was suctioned under microscopy-see procedure note  Respiratory  Inspection: breathing unlabored;  Neurovestibular  Mental Status: alert and oriented;  Psychiatric: mood normal; affect is appropriate;       Ear Microscopy    Date/Time: 9/13/2023 1:20 PM    Performed by: Leana Peralta APRN  Authorized by: Leana Peralta APRN    Ear examination was performed utilizing binocular microscopy.  Right auricle:   normal:   Right ear canal:   normal:   Right tympanic membrane:   normal:   Left auricle:   normal:   Left ear canal:   occluded, drainage. drainage details: severe, green, thick, fungal- removed with suction.   Left tympanic membrane:   normal:     Post-procedure details:     Inspection after the procedure revealed macerated skin.    No medication was  applied to the ear canal.    The procedure was tolerated well, no immediate complications.       Result Review    RESULTS REVIEW    I have reviewed the patients old records in the chart.    Assessment & Plan    Diagnoses and all orders for this visit:    1. Otorrhea of left ear (Primary)  -     $ Binocular Microscopy    2. Otomycosis of left ear    Other orders  -     clotrimazole (LOTRIMIN) 1 % external solution; Apply  topically to the appropriate area as directed 2 (Two) Times a Day for 10 days. 3-4 drops in affected ear twice a day  Dispense: 10 mL; Refill: 0      Start Lotrimin drops to the left ear.  Strict dry ear precautions.  Follow-up in 1 week.    Return in about 1 week (around 9/20/2023), or if symptoms worsen or fail to improve, for Recheck.      Leana Peralta, APRN

## 2023-09-25 ENCOUNTER — OFFICE VISIT (OUTPATIENT)
Dept: OTOLARYNGOLOGY | Facility: CLINIC | Age: 78
End: 2023-09-25
Payer: MEDICARE

## 2023-09-25 VITALS — DIASTOLIC BLOOD PRESSURE: 68 MMHG | HEART RATE: 75 BPM | TEMPERATURE: 97.8 F | SYSTOLIC BLOOD PRESSURE: 123 MMHG

## 2023-09-25 DIAGNOSIS — B36.9 OTOMYCOSIS OF LEFT EAR: ICD-10-CM

## 2023-09-25 DIAGNOSIS — H62.42 OTOMYCOSIS OF LEFT EAR: ICD-10-CM

## 2023-09-25 DIAGNOSIS — H92.12 OTORRHEA OF LEFT EAR: Primary | ICD-10-CM

## 2023-10-15 NOTE — PROGRESS NOTES
DONA Whaley  MGW ENT [facility] EAR NOSE & THROAT  [address], [address]  [place] [address]  Fax [phone]  Phone [phone]      Visit Type: FOLLOW UP   Chief Complaint   Patient presents with    Otorrhea     Follow up on otorrhea of left ear        HPI  Marika Gutierrez is a 77 y.o. male who presents for follow-up evaluation of otomycosis of the left ear.  He has been using Lotrimin drops with improved symptoms.    Past Medical History:   Diagnosis Date    Aortic valve regurgitation     Arthritis 2000    Asthma COPD    Cancer     Carotid stenosis     Cataract     right    COPD (chronic obstructive pulmonary disease)     Coronary artery disease     Depression 2011    GERD (gastroesophageal reflux disease)     Hard of hearing     Hyperlipidemia     Hypertension     PONV (postoperative nausea and vomiting)     Renal cell carcinoma     TIA (transient ischemic attack)        Past Surgical History:   Procedure Laterality Date    BACK SURGERY      CARDIAC CATHETERIZATION      X 4 - 2010 - 2005 - 2003 - 1996    CAROTID ENDARTERECTOMY Left 08/18/2021    Procedure: LEFT CAROTID ENDARTERECTOMY WITH EEG;  Surgeon: Puneet Chang DO;  Location: [facility] OR ;  Service: Vascular;  Laterality: Left;    CAROTID ENDARTERECTOMY Right 10/18/2021    Procedure: RIGHT CAROTID ENDARTERECTOMY WITH EEG;  Surgeon: Puneet Chang DO;  Location: [facility] OR ;  Service: Vascular;  Laterality: Right;    CORONARY ARTERY BYPASS GRAFT      X 4    EXTERNAL EAR SURGERY      NEPHRECTOMY      LEFT - 2017       Family History: His family history includes Asthma in his father; Heart attack in his father.     Social History: He  reports that he quit smoking about 19 years ago. His smoking use included cigarettes. He has a 40.50 pack-year smoking history. He has quit using smokeless tobacco.  His smokeless tobacco use included chew. He reports that he does not  currently use alcohol. He reports that he does not use drugs.    Home Medications:  alfuzosin, amLODIPine, atorvastatin, citalopram, clopidogrel, diazePAM, hydrALAZINE, isosorbide mononitrate, nitroglycerin, pantoprazole, predniSONE, tamsulosin, and triamcinolone    Allergies:  He is allergic to cyclobenzaprine, hydrocodone-acetaminophen, and pentazocine.       Vital Signs:    /68   Pulse 75   Temp 97.8 øF (36.6 øC) (Temporal)     ENT Physical Exam  Constitutional  Appearance: patient appears well-developed, well-nourished and well-groomed,  Communication/Voice: communication appropriate for developmental age; vocal quality normal;  Head and Face  Appearance: head appears normal and face appears atraumatic;  Ear  Hearing: impaired to conversational voice;  Auricles: right auricle normal; left auricle normal;  External Mastoids: right external mastoid normal; left external mastoid normal;  Ear Canals: right ear canal normal;  Tympanic Membranes: right tympanic membrane normal; left tympanic membrane normal;  Ear comments: Right EAC is clear without cerumen impaction.  Left EAC was suctioned under microscopy-see procedure note  Respiratory  Inspection: breathing unlabored;  Neurovestibular  Mental Status: alert and oriented;  Psychiatric: mood normal; affect is appropriate;       Ear Microscopy    Date/Time: 9/25/2023 4:29 PM    Performed by: Leana Peralta APRN  Authorized by: Leana Peralta APRN    Ear examination was performed utilizing binocular microscopy.  Right auricle:   normal:   Right ear canal:   normal:   Right tympanic membrane:   normal:   Left auricle:   normal:   Left ear canal:   erythema, squamous debris, drainage. drainage details: mild, Fungal.   Left tympanic membrane:   normal:     Post-procedure details:     Inspection after the procedure revealed an intact TM.    No medication was applied to the ear canal.    The procedure was tolerated well, no immediate complications.  Comments:       Otomycosis of left EAC has significantly improved since last visit       Result Review    RESULTS REVIEW    I have reviewed the patients old records in the chart.    Assessment & Plan    Diagnoses and all orders for this visit:    1. Otorrhea of left ear (Primary)  -     $ Binocular Microscopy    2. Otomycosis of left ear  -     $ Binocular Microscopy        Continue Lotrimin drops to the left ear x5 days.  Strict dry ear precautions.  Follow-up in 4 weeks.    Return in about 4 weeks (around 10/23/2023), or if symptoms worsen or fail to improve, for Recheck.      Leana Peralta, APRN

## 2023-12-15 ENCOUNTER — HOSPITAL ENCOUNTER (OUTPATIENT)
Dept: CARDIOLOGY | Facility: HOSPITAL | Age: 78
Discharge: HOME | End: 2023-12-15
Payer: MEDICARE

## 2023-12-15 DIAGNOSIS — I25.10 CORONARY ARTERY DISEASE INVOLVING NATIVE CORONARY ARTERY OF NATIVE HEART WITHOUT ANGINA PECTORIS: ICD-10-CM

## 2023-12-15 PROCEDURE — 93356 MYOCRD STRAIN IMG SPCKL TRCK: CPT

## 2023-12-15 PROCEDURE — 93306 TTE W/DOPPLER COMPLETE: CPT

## 2023-12-16 LAB
BH CV ECHO LEFT VENTRICLE GLOBAL LONGITUDINAL STRAIN: -13.6 %
BH CV ECHO MEAS - AO MAX PG: 10.5 MMHG
BH CV ECHO MEAS - AO MEAN PG: 4 MMHG
BH CV ECHO MEAS - AO ROOT DIAM: 3.4 CM
BH CV ECHO MEAS - AO V2 MAX: 162 CM/SEC
BH CV ECHO MEAS - AO V2 VTI: 29.6 CM
BH CV ECHO MEAS - AVA(I,D): 2.7 CM2
BH CV ECHO MEAS - EDV(CUBED): 112.7 ML
BH CV ECHO MEAS - EDV(MOD-SP4): 64.4 ML
BH CV ECHO MEAS - EF(MOD-SP4): 54.2 %
BH CV ECHO MEAS - ESV(CUBED): 27 ML
BH CV ECHO MEAS - ESV(MOD-SP4): 29.5 ML
BH CV ECHO MEAS - FS: 37.9 %
BH CV ECHO MEAS - IVS/LVPW: 0.99 CM
BH CV ECHO MEAS - IVSD: 0.98 CM
BH CV ECHO MEAS - LA DIMENSION: 3.9 CM
BH CV ECHO MEAS - LAT PEAK E' VEL: 10.9 CM/SEC
BH CV ECHO MEAS - LV DIASTOLIC VOL/BSA (35-75): 31.4 CM2
BH CV ECHO MEAS - LV MASS(C)D: 169.2 GRAMS
BH CV ECHO MEAS - LV MAX PG: 4.8 MMHG
BH CV ECHO MEAS - LV MEAN PG: 3 MMHG
BH CV ECHO MEAS - LV SYSTOLIC VOL/BSA (12-30): 14.4 CM2
BH CV ECHO MEAS - LV V1 MAX: 109 CM/SEC
BH CV ECHO MEAS - LV V1 VTI: 25.9 CM
BH CV ECHO MEAS - LVIDD: 4.8 CM
BH CV ECHO MEAS - LVIDS: 3 CM
BH CV ECHO MEAS - LVOT AREA: 3.1 CM2
BH CV ECHO MEAS - LVOT DIAM: 2 CM
BH CV ECHO MEAS - LVPWD: 0.99 CM
BH CV ECHO MEAS - MED PEAK E' VEL: 6 CM/SEC
BH CV ECHO MEAS - MR MAX PG: 56.9 MMHG
BH CV ECHO MEAS - MR MAX VEL: 377 CM/SEC
BH CV ECHO MEAS - MV A MAX VEL: 90.6 CM/SEC
BH CV ECHO MEAS - MV DEC SLOPE: 266 CM/SEC2
BH CV ECHO MEAS - MV DEC TIME: 0.26 SEC
BH CV ECHO MEAS - MV E MAX VEL: 67.9 CM/SEC
BH CV ECHO MEAS - MV E/A: 0.75
BH CV ECHO MEAS - PA V2 MAX: 156 CM/SEC
BH CV ECHO MEAS - RAP SYSTOLE: 5 MMHG
BH CV ECHO MEAS - RV MAX PG: 6.9 MMHG
BH CV ECHO MEAS - RV V1 MAX: 131 CM/SEC
BH CV ECHO MEAS - RV V1 VTI: 24.8 CM
BH CV ECHO MEAS - RVSP: 38.2 MMHG
BH CV ECHO MEAS - SI(MOD-SP4): 17 ML/M2
BH CV ECHO MEAS - SV(LVOT): 81.4 ML
BH CV ECHO MEAS - SV(MOD-SP4): 34.9 ML
BH CV ECHO MEAS - TR MAX PG: 33.2 MMHG
BH CV ECHO MEAS - TR MAX VEL: 288 CM/SEC
BH CV ECHO MEASUREMENTS AVERAGE E/E' RATIO: 8.04
BH CV XLRA - TDI S': 14.5 CM/SEC
LEFT ATRIUM VOLUME INDEX: 40 ML/M2
LEFT ATRIUM VOLUME: 83 ML

## 2023-12-19 ENCOUNTER — TELEPHONE (OUTPATIENT)
Dept: CARDIOLOGY | Facility: CLINIC | Age: 78
End: 2023-12-19
Payer: MEDICARE

## 2023-12-19 NOTE — TELEPHONE ENCOUNTER
Caller: ELEAZAR CADENA    Relationship: Emergency Contact    Best call back number: [phone]    What is the best time to reach you: ANY    Who are you requesting to speak with (clinical staff, provider,  specific staff member): ANY    Do you know the name of the person who called: NO MESSAGE WAS LEFT    What was the call regarding: PATIENTS EM CALLED AND STATED THEY MISSED A CALL THIS AFTERNOON AND THEY THINK ITS REGARDING HIS ECHO HE HAD ON FRIDAY. PLEASE CALL HIM BACK WHEN ABLE, THANK YOU    Is it okay if the provider responds through [email or web address]: PLEASE CALL

## 2023-12-20 NOTE — TELEPHONE ENCOUNTER
Can you please call the patient let him know his echo results.  Tell him that his pumping function is still normal.  Tell him that his leaky valve, his aortic valve, is still in the moderate stage and unchanged.  He does have some new diastolic impairment.  This just means that his heart is not relaxing as well as it used to.  Asked the patient if he had any worsening of his shortness of breath or if has had any reduction in his activity tolerance.  If so we may consider seeing him sooner than his appointment with Dr. Hoskins in February.      December 19, 2023  Louise Alvarez MA   to Reinier Ball, APRN     12/19/23  4:06 PM  I have called pt several times and still no answer. I will try again in the morning.      I gave patient is results because  he came into the office.

## 2024-01-16 RX ORDER — ISOSORBIDE MONONITRATE 60 MG/1
TABLET, EXTENDED RELEASE ORAL
Qty: 90 TABLET | Refills: 3 | Status: SHIPPED | OUTPATIENT
Start: 2024-01-16

## 2024-02-14 ENCOUNTER — OFFICE VISIT (OUTPATIENT)
Dept: CARDIOLOGY | Facility: CLINIC | Age: 79
End: 2024-02-14
Payer: MEDICARE

## 2024-02-14 VITALS
BODY MASS INDEX: 30.31 KG/M2 | DIASTOLIC BLOOD PRESSURE: 78 MMHG | WEIGHT: 200 LBS | OXYGEN SATURATION: 96 % | SYSTOLIC BLOOD PRESSURE: 160 MMHG | HEIGHT: 68 IN | HEART RATE: 69 BPM

## 2024-02-14 DIAGNOSIS — I65.23 BILATERAL CAROTID ARTERY STENOSIS: Primary | ICD-10-CM

## 2024-02-14 DIAGNOSIS — R94.39 ABNORMAL NUCLEAR STRESS TEST: ICD-10-CM

## 2024-02-14 DIAGNOSIS — I65.29 OCCLUSION OF CAROTID ARTERY, UNSPECIFIED LATERALITY: ICD-10-CM

## 2024-02-14 DIAGNOSIS — R06.09 DOE (DYSPNEA ON EXERTION): ICD-10-CM

## 2024-02-14 DIAGNOSIS — Z87.891 EX-SMOKER FOR MORE THAN 1 YEAR: ICD-10-CM

## 2024-02-14 DIAGNOSIS — I65.21 STENOSIS OF RIGHT CAROTID ARTERY: ICD-10-CM

## 2024-02-14 DIAGNOSIS — I25.10: ICD-10-CM

## 2024-02-14 PROCEDURE — 93000 ELECTROCARDIOGRAM COMPLETE: CPT | Performed by: INTERNAL MEDICINE

## 2024-02-14 PROCEDURE — 3077F SYST BP >= 140 MM HG: CPT | Performed by: INTERNAL MEDICINE

## 2024-02-14 PROCEDURE — 1160F RVW MEDS BY RX/DR IN RCRD: CPT | Performed by: INTERNAL MEDICINE

## 2024-02-14 PROCEDURE — 3078F DIAST BP <80 MM HG: CPT | Performed by: INTERNAL MEDICINE

## 2024-02-14 PROCEDURE — 1159F MED LIST DOCD IN RCRD: CPT | Performed by: INTERNAL MEDICINE

## 2024-02-14 PROCEDURE — 99214 OFFICE O/P EST MOD 30 MIN: CPT | Performed by: INTERNAL MEDICINE

## 2024-02-14 RX ORDER — NITROGLYCERIN 0.4 MG/1
0.4 TABLET SUBLINGUAL
Qty: 25 TABLET | Refills: 3 | Status: SHIPPED | OUTPATIENT
Start: 2024-02-14

## 2024-03-08 ENCOUNTER — HOSPITAL ENCOUNTER (OUTPATIENT)
Dept: CT IMAGING | Facility: HOSPITAL | Age: 79
Discharge: HOME | End: 2024-03-08
Payer: MEDICARE

## 2024-03-08 DIAGNOSIS — C64.2 RENAL CELL CARCINOMA OF LEFT KIDNEY: ICD-10-CM

## 2024-03-08 LAB — CREAT BLDA-MCNC: 1.3 MG/DL (ref 0.6–1.3)

## 2024-03-08 PROCEDURE — 74160 CT ABDOMEN W/CONTRAST: CPT

## 2024-03-08 PROCEDURE — 25510000001 IOPAMIDOL 61 % SOLUTION: Performed by: UROLOGY

## 2024-03-08 PROCEDURE — 82565 ASSAY OF CREATININE: CPT

## 2024-03-08 RX ADMIN — IOPAMIDOL 100 ML: 612 INJECTION, SOLUTION INTRAVENOUS at 10:12

## 2024-03-08 NOTE — PROGRESS NOTES
Chief Complaint  Renal cell carcinoma of left kidney     Subjective          Marika Gutierrez presents to [facility] UROLOGY   He returns for follow-up of renal cell carcinoma.  He is 6-1/2 years status post nephrectomy in [facility].   The patient denies any hematuria, flank pain patient denies gross hematuria, flank pain, abdominal pain, night sweats, unexplained weight loss, unusual musculoskeletal pain, hemoptysis, or unexplained shortness of breath.      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  He has some BPH.  Has been on tamsulosin for quite some time.        Current Outpatient Medications:     alfuzosin (UROXATRAL) 10 MG 24 hr tablet, Take 1 tablet by mouth Daily., Disp: 90 tablet, Rfl: 3    amLODIPine (NORVASC) 10 MG tablet, TAKE 1 TABLET DAILY, Disp: 90 tablet, Rfl: 3    atorvastatin (LIPITOR) 40 MG tablet, Take 1 tablet by mouth Daily., Disp: 90 tablet, Rfl: 3    citalopram (CeleXA) 20 MG tablet, Take 1 tablet by mouth Daily., Disp: , Rfl:     clopidogrel (PLAVIX) 75 MG tablet, TAKE 1 TABLET DAILY, Disp: 90 tablet, Rfl: 3    diazePAM (VALIUM) 5 MG tablet, Take 1 tablet by mouth Every Night., Disp: , Rfl:     hydrALAZINE (APRESOLINE) 50 MG tablet, TAKE 1 TABLET TWICE A DAY, Disp: 180 tablet, Rfl: 3    isosorbide mononitrate (IMDUR) 60 MG 24 hr tablet, TAKE 1 TABLET DAILY, Disp: 90 tablet, Rfl: 3    nitroglycerin (NITROSTAT) 0.4 MG SL tablet, Place 1 tablet under the tongue Every 5 (Five) Minutes As Needed for Chest Pain., Disp: 25 tablet, Rfl: 3    pantoprazole (PROTONIX) 40 MG EC tablet, Take 1 tablet by mouth Daily., Disp: , Rfl:     predniSONE (DELTASONE) 5 MG tablet, Take 1 tablet by mouth Take As Directed. Every other week, currently off of, Disp: , Rfl:     tamsulosin  "(FLOMAX) 0.4 MG capsule 24 hr capsule, Take 1 capsule by mouth Daily., Disp: 90 capsule, Rfl: 3    triamcinolone (KENALOG) 0.1 % cream, Daily As Needed., Disp: , Rfl:   Past Medical History:   Diagnosis Date    Aortic valve regurgitation     Arthritis 2000    Asthma COPD    Cancer     Carotid stenosis     Cataract     right    COPD (chronic obstructive pulmonary disease)     Coronary artery disease     Depression 2011    GERD (gastroesophageal reflux disease)     Hard of hearing     Hyperlipidemia     Hypertension     Intermittent claudication 09/29/2010    PONV (postoperative nausea and vomiting)     Renal cell carcinoma     TIA (transient ischemic attack)      Past Surgical History:   Procedure Laterality Date    BACK SURGERY      CARDIAC CATHETERIZATION      X 4 - 2010 - 2005 - 2003 - 1996    CAROTID ENDARTERECTOMY Left 08/18/2021    Procedure: LEFT CAROTID ENDARTERECTOMY WITH EEG;  Surgeon: Puneet Chang DO;  Location:  [facility];  Service: Vascular;  Laterality: Left;    CAROTID ENDARTERECTOMY Right 10/18/2021    Procedure: RIGHT CAROTID ENDARTERECTOMY WITH EEG;  Surgeon: Puneet Chang DO;  Location:  [facility];  Service: Vascular;  Laterality: Right;    CORONARY ARTERY BYPASS GRAFT      X 4    EXTERNAL EAR SURGERY      NEPHRECTOMY      LEFT - 2017           Review of Systems      Objective   PHYSICAL EXAM  Vital Signs:   Ht 172.7 cm (68\")   Wt 92.1 kg (203 lb)   BMI 30.87 kg/m²     Physical Exam      DATA  Result Review :              Results for orders placed or performed in visit on 03/11/24   POC Urinalysis Dipstick, Multipro    Specimen: Urine   Result Value Ref Range    Color Yellow Yellow, Straw, Dark Yellow, Adrienne    Clarity, UA Clear Clear    Glucose, UA Negative Negative mg/dL    Bilirubin Small (1+) (A) Negative    Ketones, UA Negative Negative    Specific Gravity  1.025 1.005 - 1.030    Blood, UA Negative Negative    pH, Urine 5.5 5.0 - 8.0    Protein, POC Trace (A) " Negative mg/dL    Urobilinogen, UA Normal Normal, 0.2 E.U./dL    Nitrite, UA Negative Negative    Leukocytes Negative Negative     XR CHEST 2 VW- 3/11/2024 8:50 AM     HISTORY: renal cell; C64.2-Malignant neoplasm of left kidney, except  renal pelvis       COMPARISON: 3/2/2023.     TECHNIQUE: Frontal and lateral radiographs of the chest was obtained.     FINDINGS:    Support Devices: Postsurgical changes of CABG.   Cardiac and Mediastinal Silhouettes: Normal.   Lungs/Pleura: Similar-appearing elevated right hemidiaphragm with right  basilar atelectasis. No sizable pleural effusion. No visible  pneumothorax.   Osseous structures: No acute osseous finding.   Other: None.   IMPRESSION:  No acute cardiopulmonary abnormality.   This report was signed and finalized on 3/11/2024 9:55 AM by Brenton Villatoro.         3/8/24  CT Abdomen With Contrast   Postoperative change of left nephrectomy. Small area of fat necrosis is  present along the left iliopsoas muscle. No suspicious solid enhancing  nodule in the left nephrectomy bed to suggest local disease recurrence.   Right renal cysts are present without complex features. No solid  enhancing right renal mass. No right-sided urolithiasis or  hydronephrosis.  IMPRESSION:   1.  Postoperative change of left nephrectomy without evidence of  recurrent or metastatic disease in the abdomen.   2.  Atherosclerotic ectatic infrarenal abdominal artery measuring up to  2.6 cm diameter. 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   This report was signed and finalized on 3/8/2024 10:34 AM by Dr. Yousif Caban MD.    Lab Results   Component Value Date    WBC 9.01 03/11/2024    HGB 12.6 (L) 03/11/2024    HCT 39.1 03/11/2024    MCV 89.1 03/11/2024     03/11/2024       Lab Results   Component Value Date    GLUCOSE 112 (H) 03/11/2024    BUN 19 03/11/2024    CREATININE 1.35 (H) 03/11/2024    EGFRRESULT 54.1 (L) 03/11/2024    BCR 14.1  03/11/2024    K 4.2 03/11/2024    CO2 26.0 03/11/2024    CALCIUM 8.8 03/11/2024    PROTENTOTREF 6.3 03/11/2024    ALBUMIN 3.9 03/11/2024    BILITOT 0.5 03/11/2024    AST 16 03/11/2024    ALT 18 03/11/2024          ASSESSMENT AND PLAN          Problem List Items Addressed This Visit          Hematology and Neoplasia    Renal cell carcinoma - Primary     He has no evidence of recurrent disease.  We discussed follow-up.  According to the guidelines for postoperative management of renal cell carcinoma, considering possibility of delayed recurrence I did discuss further follow-up including the possibility of changing to biannual cross-sectional imaging.  At this point he would still like to be followed annually.      FOLLOW UP     Return in about 1 year (around 3/12/2025) for CBC, CXR, CMP, ct renal mass protocol before next visit .        (Please note that portions of this note were completed with a voice recognition program.)  Kodi Burrell MD  03/12/24  14:04 CDT

## 2024-03-11 ENCOUNTER — HOSPITAL ENCOUNTER (OUTPATIENT)
Dept: GENERAL RADIOLOGY | Facility: HOSPITAL | Age: 79
Discharge: HOME | End: 2024-03-11
Admitting: UROLOGY
Payer: MEDICARE

## 2024-03-11 ENCOUNTER — LAB (OUTPATIENT)
Dept: UROLOGY | Facility: CLINIC | Age: 79
End: 2024-03-11
Payer: MEDICARE

## 2024-03-11 DIAGNOSIS — C64.2 RENAL CELL CARCINOMA OF LEFT KIDNEY: ICD-10-CM

## 2024-03-11 DIAGNOSIS — C64.2 RENAL CELL CARCINOMA OF LEFT KIDNEY: Primary | ICD-10-CM

## 2024-03-11 PROCEDURE — 71046 X-RAY EXAM CHEST 2 VIEWS: CPT

## 2024-03-11 RX ORDER — CLOPIDOGREL BISULFATE 75 MG/1
TABLET ORAL
Qty: 90 TABLET | Refills: 3 | Status: SHIPPED | OUTPATIENT
Start: 2024-03-11

## 2024-03-12 ENCOUNTER — OFFICE VISIT (OUTPATIENT)
Dept: UROLOGY | Facility: CLINIC | Age: 79
End: 2024-03-12
Payer: MEDICARE

## 2024-03-12 VITALS — HEIGHT: 68 IN | WEIGHT: 203 LBS | BODY MASS INDEX: 30.77 KG/M2

## 2024-03-12 DIAGNOSIS — C64.2 RENAL CELL CARCINOMA OF LEFT KIDNEY: Primary | ICD-10-CM

## 2024-03-12 LAB
ALBUMIN SERPL-MCNC: 3.9 G/DL (ref 3.5–5.2)
ALBUMIN/GLOB SERPL: 1.6 G/DL
ALP SERPL-CCNC: 139 U/L (ref 39–117)
ALT SERPL-CCNC: 18 U/L (ref 1–41)
AST SERPL-CCNC: 16 U/L (ref 1–40)
BILIRUB SERPL-MCNC: 0.5 MG/DL (ref 0–1.2)
BUN SERPL-MCNC: 19 MG/DL (ref 8–23)
BUN/CREAT SERPL: 14.1 (ref 7–25)
CALCIUM SERPL-MCNC: 8.8 MG/DL (ref 8.6–10.5)
CHLORIDE SERPL-SCNC: 101 MMOL/L (ref 98–107)
CO2 SERPL-SCNC: 26 MMOL/L (ref 22–29)
CREAT SERPL-MCNC: 1.35 MG/DL (ref 0.76–1.27)
EGFRCR SERPLBLD CKD-EPI 2021: 54.1 ML/MIN/1.73
ERYTHROCYTE [DISTWIDTH] IN BLOOD BY AUTOMATED COUNT: 12 % (ref 12.3–15.4)
GLOBULIN SER CALC-MCNC: 2.4 GM/DL
GLUCOSE SERPL-MCNC: 112 MG/DL (ref 65–99)
HCT VFR BLD AUTO: 39.1 % (ref 37.5–51)
HGB BLD-MCNC: 12.6 G/DL (ref 13–17.7)
MCH RBC QN AUTO: 28.7 PG (ref 26.6–33)
MCHC RBC AUTO-ENTMCNC: 32.2 G/DL (ref 31.5–35.7)
MCV RBC AUTO: 89.1 FL (ref 79–97)
PLATELET # BLD AUTO: 194 10*3/MM3 (ref 140–450)
POTASSIUM SERPL-SCNC: 4.2 MMOL/L (ref 3.5–5.2)
PROT SERPL-MCNC: 6.3 G/DL (ref 6–8.5)
RBC # BLD AUTO: 4.39 10*6/MM3 (ref 4.14–5.8)
SODIUM SERPL-SCNC: 139 MMOL/L (ref 136–145)
WBC # BLD AUTO: 9.01 10*3/MM3 (ref 3.4–10.8)

## 2024-03-12 PROCEDURE — 1159F MED LIST DOCD IN RCRD: CPT | Performed by: UROLOGY

## 2024-03-12 PROCEDURE — 1160F RVW MEDS BY RX/DR IN RCRD: CPT | Performed by: UROLOGY

## 2024-03-12 PROCEDURE — 99213 OFFICE O/P EST LOW 20 MIN: CPT | Performed by: UROLOGY

## 2024-03-20 ENCOUNTER — HOSPITAL ENCOUNTER (OUTPATIENT)
Dept: CARDIOLOGY | Facility: HOSPITAL | Age: 79
Discharge: HOME | End: 2024-03-20
Payer: MEDICARE

## 2024-03-20 VITALS
WEIGHT: 200 LBS | SYSTOLIC BLOOD PRESSURE: 147 MMHG | DIASTOLIC BLOOD PRESSURE: 71 MMHG | HEIGHT: 68 IN | BODY MASS INDEX: 30.31 KG/M2 | HEART RATE: 59 BPM

## 2024-03-20 DIAGNOSIS — R94.39 ABNORMAL NUCLEAR STRESS TEST: ICD-10-CM

## 2024-03-20 DIAGNOSIS — R06.09 DOE (DYSPNEA ON EXERTION): ICD-10-CM

## 2024-03-20 LAB
BH CV REST NUCLEAR ISOTOPE DOSE: 10.6 MCI
BH CV STRESS BP STAGE 1: NORMAL
BH CV STRESS COMMENTS STAGE 1: NORMAL
BH CV STRESS DOSE REGADENOSON STAGE 1: 0.4
BH CV STRESS DURATION MIN STAGE 1: 0
BH CV STRESS DURATION SEC STAGE 1: 10
BH CV STRESS HR STAGE 1: 77
BH CV STRESS NUCLEAR ISOTOPE DOSE: 30.3 MCI
BH CV STRESS PROTOCOL 1: NORMAL
BH CV STRESS RECOVERY BP: NORMAL MMHG
BH CV STRESS RECOVERY HR: 67 BPM
BH CV STRESS STAGE 1: 1
LV EF NUC BP: 59 %
MAXIMAL PREDICTED HEART RATE: 143 BPM
PERCENT MAX PREDICTED HR: 53.85 %
STRESS BASELINE BP: NORMAL MMHG
STRESS BASELINE HR: 58 BPM
STRESS PERCENT HR: 63 %
STRESS POST EXERCISE DUR SEC: 10 SEC
STRESS POST PEAK BP: NORMAL MMHG
STRESS POST PEAK HR: 77 BPM
STRESS TARGET HR: 122 BPM

## 2024-03-20 PROCEDURE — 0 TECHNETIUM TETROFOSMIN KIT: Performed by: INTERNAL MEDICINE

## 2024-03-20 PROCEDURE — 93017 CV STRESS TEST TRACING ONLY: CPT

## 2024-03-20 PROCEDURE — 78452 HT MUSCLE IMAGE SPECT MULT: CPT

## 2024-03-20 PROCEDURE — A9502 TC99M TETROFOSMIN: HCPCS | Performed by: INTERNAL MEDICINE

## 2024-03-20 PROCEDURE — 25010000002 REGADENOSON 0.4 MG/5ML SOLUTION: Performed by: INTERNAL MEDICINE

## 2024-03-20 RX ORDER — REGADENOSON 0.08 MG/ML
0.4 INJECTION, SOLUTION INTRAVENOUS ONCE
Status: COMPLETED | OUTPATIENT
Start: 2024-03-20 | End: 2024-03-20

## 2024-03-20 RX ADMIN — TETROFOSMIN 1 DOSE: 1.38 INJECTION, POWDER, LYOPHILIZED, FOR SOLUTION INTRAVENOUS at 09:30

## 2024-03-20 RX ADMIN — REGADENOSON 0.4 MG: 0.08 INJECTION, SOLUTION INTRAVENOUS at 09:20

## 2024-03-20 RX ADMIN — TETROFOSMIN 1 DOSE: 1.38 INJECTION, POWDER, LYOPHILIZED, FOR SOLUTION INTRAVENOUS at 08:15

## 2024-03-25 DIAGNOSIS — C64.2 RENAL CELL CARCINOMA OF LEFT KIDNEY: ICD-10-CM

## 2024-03-25 RX ORDER — TAMSULOSIN HYDROCHLORIDE 0.4 MG/1
1 CAPSULE ORAL DAILY
Qty: 90 CAPSULE | Refills: 3 | Status: SHIPPED | OUTPATIENT
Start: 2024-03-25

## 2024-06-05 RX ORDER — NITROGLYCERIN 0.4 MG/1
TABLET SUBLINGUAL
Qty: 100 TABLET | Refills: 1 | Status: SHIPPED | OUTPATIENT
Start: 2024-06-05

## 2024-06-05 RX ORDER — AMLODIPINE BESYLATE 10 MG/1
TABLET ORAL
Qty: 90 TABLET | Refills: 3 | Status: SHIPPED | OUTPATIENT
Start: 2024-06-05

## 2024-08-01 ENCOUNTER — TELEPHONE (OUTPATIENT)
Dept: VASCULAR SURGERY | Facility: CLINIC | Age: 79
End: 2024-08-01
Payer: MEDICARE

## 2024-08-01 NOTE — TELEPHONE ENCOUNTER
Call placed to patient with no answer, voicemail left reminding of testing and appointment, requested call back

## 2024-08-02 ENCOUNTER — OFFICE VISIT (OUTPATIENT)
Dept: VASCULAR SURGERY | Facility: CLINIC | Age: 79
End: 2024-08-02
Payer: MEDICARE

## 2024-08-02 ENCOUNTER — HOSPITAL ENCOUNTER (OUTPATIENT)
Dept: ULTRASOUND IMAGING | Facility: HOSPITAL | Age: 79
Discharge: HOME | End: 2024-08-02
Payer: MEDICARE

## 2024-08-02 VITALS
BODY MASS INDEX: 30.01 KG/M2 | HEIGHT: 68 IN | RESPIRATION RATE: 20 BRPM | OXYGEN SATURATION: 96 % | WEIGHT: 198 LBS | SYSTOLIC BLOOD PRESSURE: 138 MMHG | DIASTOLIC BLOOD PRESSURE: 60 MMHG | HEART RATE: 78 BPM

## 2024-08-02 DIAGNOSIS — I10 BENIGN ESSENTIAL HTN: ICD-10-CM

## 2024-08-02 DIAGNOSIS — I65.23 BILATERAL CAROTID ARTERY STENOSIS: ICD-10-CM

## 2024-08-02 DIAGNOSIS — I65.23 BILATERAL CAROTID ARTERY STENOSIS: Primary | ICD-10-CM

## 2024-08-02 DIAGNOSIS — E78.2 MIXED HYPERLIPIDEMIA: ICD-10-CM

## 2024-08-02 PROCEDURE — 1159F MED LIST DOCD IN RCRD: CPT | Performed by: NURSE PRACTITIONER

## 2024-08-02 PROCEDURE — 99214 OFFICE O/P EST MOD 30 MIN: CPT | Performed by: NURSE PRACTITIONER

## 2024-08-02 PROCEDURE — 3078F DIAST BP <80 MM HG: CPT | Performed by: NURSE PRACTITIONER

## 2024-08-02 PROCEDURE — 1160F RVW MEDS BY RX/DR IN RCRD: CPT | Performed by: NURSE PRACTITIONER

## 2024-08-02 PROCEDURE — 3075F SYST BP GE 130 - 139MM HG: CPT | Performed by: NURSE PRACTITIONER

## 2024-08-02 PROCEDURE — 93880 EXTRACRANIAL BILAT STUDY: CPT

## 2024-08-02 NOTE — PROGRESS NOTES
"8/2/2024       Abilio Martinez MD  [address] [facility] [address]    Marika Gutierrez  1945    Chief Complaint   Patient presents with    Bilateral carotid artery stenosis     Pt is here for a 1 year follow up.        Dear Abilio Martinez MD    HPI  I had the pleasure of seeing your patient Marika Gutierrez in the office today.    As you recall, Marika Gutierrez is a 78 y.o.  male who we are following for carotid occlusive disease.  He did undergo a left carotid endarterectomy on 8/18/2021 and a right carotid on 10/18/2021.  Currently he is doing well and denies any strokelike symptoms.  He is maintained on Plavix and Lipitor.  He did have noninvasive testing performed today, which I did review in office.        Review of Systems   Constitutional: Negative.    HENT: Negative.     Eyes: Negative.    Respiratory: Negative.     Cardiovascular: Negative.    Gastrointestinal: Negative.    Endocrine: Negative.    Genitourinary: Negative.    Musculoskeletal: Negative.    Skin: Negative.    Allergic/Immunologic: Negative.    Neurological: Negative.    Hematological: Negative.    Psychiatric/Behavioral: Negative.     All other systems reviewed and are negative.         /60 (BP Location: Left arm)   Pulse 78   Resp 20   Ht 172.7 cm (67.99\")   Wt 89.8 kg (198 lb)   SpO2 96%   BMI 30.11 kg/m²   Physical Exam  Vitals and nursing note reviewed.   Constitutional:       General: He is not in acute distress.     Appearance: Normal appearance. He is well-developed. He is not diaphoretic.   HENT:      Head: Normocephalic and atraumatic.   Neck:      Vascular: No carotid bruit or JVD.   Cardiovascular:      Rate and Rhythm: Normal rate and regular rhythm.      Pulses: Normal pulses.           Femoral pulses are 2+ on the right side and 2+ on the left side.       Popliteal pulses are 2+ on the right side and 2+ on the left side.        Dorsalis pedis pulses are 2+ on the right side and 2+ on " the left side.        Posterior tibial pulses are 2+ on the right side and 2+ on the left side.      Heart sounds: S1 normal and S2 normal. Murmur heard.      No friction rub. No gallop.   Pulmonary:      Effort: Pulmonary effort is normal.      Breath sounds: Normal breath sounds.   Abdominal:      General: Bowel sounds are normal. There is no abdominal bruit.      Palpations: Abdomen is soft.      Tenderness: There is no abdominal tenderness.   Musculoskeletal:         General: Normal range of motion.   Skin:     General: Skin is warm and dry.   Neurological:      Mental Status: He is alert and oriented to person, place, and time.      Cranial Nerves: No cranial nerve deficit.   Psychiatric:         Behavior: Behavior normal.         Thought Content: Thought content normal.         Judgment: Judgment normal.           Diagnostic Data:  Noninvasive testing including a carotid duplex shows less than 50% carotid stenosis bilaterally with bilateral antegrade vertebral flow.    Narrative & Impression   EXAM/TECHNIQUE: CT abdomen with IV contrast     INDICATION: HX RENAL CELL CARCINOMA; C64.2-Malignant neoplasm of left  kidney, except renal pelvis     COMPARISON: None     DLP: 346.21 mGy.cm. Automated exposure control was also utilized to  decrease patient radiation dose.     FINDINGS:     Right hemidiaphragm elevation. Interstitial reticulation/fibrosis and  emphysema is noted in the lung bases.     Tiny 6 mm hypodense segment 2 liver lesion on image 22 is too small to  characterize but likely a cyst. No solid liver lesion. No gallstone or  gallbladder wall thickening. No biliary ductal dilatation. Pancreas,  spleen, and adrenal glands are unremarkable.     Postoperative change of left nephrectomy. Small area of fat necrosis is  present along the left iliopsoas muscle. No suspicious solid enhancing  nodule in the left nephrectomy bed to suggest local disease recurrence.     2 right renal cysts are present without  complex features. No solid  enhancing right renal mass. No right-sided urolithiasis or  hydronephrosis.     No small bowel distention or evidence of active small bowel  inflammation. Visualized portion of the colon is unremarkable without  evidence of focal wall thickening or active inflammatory change.     No ascites. Atherosclerotic ectatic infrarenal abdominal aorta measuring  up to 2.6 cm. Chronic appearing nonflow-limiting calcified dissection in  the distal abdominal aorta. The SMA is patent. No enlarged  retroperitoneal or mesenteric lymph nodes in the abdomen.     No acute abdominal wall soft tissue abnormality. No suspicious osseous  lesion. Partially imaged postoperative change of median sternotomy.  Multilevel lumbar spine degenerative change.     IMPRESSION:     1.  Postoperative change of left nephrectomy without evidence of  recurrent or metastatic disease in the abdomen.     2.  Atherosclerotic ectatic infrarenal abdominal artery measuring up to  2.6 cm diameter. 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     This report was signed and finalized on 3/8/2024 10:34 AM by Dr. Yousif Caban MD.       Patient Active Problem List   Diagnosis    Coronary artery disease involving native coronary artery of native heart without angina pectoris    S/P CABG (coronary artery bypass graft) 1996 [place]     Mixed hyperlipidemia    Benign essential HTN    Family history of early CAD    Ex-smoker for more than 1 year 1PPD x 40 years quit 16 years ago    Nonrheumatic aortic valve insufficiency    Class 1 obesity due to excess calories with serious comorbidity and body mass index (BMI) of 31.0 to 31.9 in adult    Bilateral carotid artery stenosis    Preop testing    Stenosis of right carotid artery    Carotid stenosis, right    Abnormal laboratory test    Arteriosclerosis of arterial coronary artery bypass graft    Arthralgia    Benign prostatic hyperplasia with  urinary obstruction    Cardiovascular system problem    Carotid artery occlusion    DDD (degenerative disc disease), lumbosacral    Inflammatory polyarthropathy    Intermittent claudication    Osteoarthritis of knee    Palpitations    Renal cell carcinoma    Tendinitis    BMI 31.0-31.9,adult    Abnormal nuclear stress test infarction no ischemia         Diagnosis Plan   1. Bilateral carotid artery stenosis  US Carotid Bilateral      2. Mixed hyperlipidemia        3. Benign essential HTN              Plan: After thoroughly evaluating Marika Gutierrez, I believe the best course of action is to remain conservative from vascular surgery standpoint.  Overall he is doing fairly well.  He denies any strokelike symptoms.  I did review his testing which shows less than 50% carotid stenosis bilaterally.  He has previously undergone a left nephrectomy since last visit and testing noted ectasia of infrarenal abdominal aorta with a calcified dissection flap in the distal aorta.  He denies any complaints of claudication to his lower extremities.  We will plan on seeing him back in 1 year with repeat noninvasive testing including a carotid duplex for continued surveillance.  I did discuss vascular risk factors as they pertain to the progression of vascular disease including controlling his hypertension and hyperlipidemia.  His blood pressure stable on his current medications.  He should continue his Plavix 75 mg daily and Lipitor 40 mg daily in addition to his other medications.  This was all discussed in full with complete understanding.  Thank you for allowing me to participate in the care of your patient.  Please do not hesitate to call with any questions or concerns.  We will keep you aware of any further encounters with Marika Gutierrez.        Sincerely yours,         DONA Méndez, Abilio Solis MD

## 2024-08-02 NOTE — LETTER
"August 2, 2024     Abilio Martinez MD  [address] [facility] [address]    Patient: Marika Gutierrez   YOB: 1945   Date of Visit: 8/2/2024     Dear Abilio Martinez MD:       Thank you for referring Marika Gutierrez to me for evaluation. Below are the relevant portions of my assessment and plan of care.    If you have questions, please do not hesitate to call me. I look forward to following Marika along with you.         Sincerely,        DONA Méndez        CC: No Recipients    Faith Wells APRN  08/02/24 1307  Sign when Signing Visit  8/2/2024       Abilio Martinez MD  [address] [facility] [address]    Marika Gutierrez  1945    Chief Complaint   Patient presents with   • Bilateral carotid artery stenosis     Pt is here for a 1 year follow up.        Dear Abilio Martinez MD    HPI  I had the pleasure of seeing your patient Marika Gutierrez in the office today.    As you recall, Marika Gutierrez is a 78 y.o.  male who we are following for carotid occlusive disease.  He did undergo a left carotid endarterectomy on 8/18/2021 and a right carotid on 10/18/2021.  Currently he is doing well and denies any strokelike symptoms.  He is maintained on Plavix and Lipitor.  He did have noninvasive testing performed today, which I did review in office.        Review of Systems   Constitutional: Negative.    HENT: Negative.     Eyes: Negative.    Respiratory: Negative.     Cardiovascular: Negative.    Gastrointestinal: Negative.    Endocrine: Negative.    Genitourinary: Negative.    Musculoskeletal: Negative.    Skin: Negative.    Allergic/Immunologic: Negative.    Neurological: Negative.    Hematological: Negative.    Psychiatric/Behavioral: Negative.     All other systems reviewed and are negative.         /60 (BP Location: Left arm)   Pulse 78   Resp 20   Ht 172.7 cm (67.99\")   Wt 89.8 kg (198 lb)   SpO2 96%   BMI 30.11 kg/m² "   Physical Exam  Vitals and nursing note reviewed.   Constitutional:       General: He is not in acute distress.     Appearance: Normal appearance. He is well-developed. He is not diaphoretic.   HENT:      Head: Normocephalic and atraumatic.   Neck:      Vascular: No carotid bruit or JVD.   Cardiovascular:      Rate and Rhythm: Normal rate and regular rhythm.      Pulses: Normal pulses.           Femoral pulses are 2+ on the right side and 2+ on the left side.       Popliteal pulses are 2+ on the right side and 2+ on the left side.        Dorsalis pedis pulses are 2+ on the right side and 2+ on the left side.        Posterior tibial pulses are 2+ on the right side and 2+ on the left side.      Heart sounds: S1 normal and S2 normal. Murmur heard.      No friction rub. No gallop.   Pulmonary:      Effort: Pulmonary effort is normal.      Breath sounds: Normal breath sounds.   Abdominal:      General: Bowel sounds are normal. There is no abdominal bruit.      Palpations: Abdomen is soft.      Tenderness: There is no abdominal tenderness.   Musculoskeletal:         General: Normal range of motion.   Skin:     General: Skin is warm and dry.   Neurological:      Mental Status: He is alert and oriented to person, place, and time.      Cranial Nerves: No cranial nerve deficit.   Psychiatric:         Behavior: Behavior normal.         Thought Content: Thought content normal.         Judgment: Judgment normal.           Diagnostic Data:  Noninvasive testing including a carotid duplex shows less than 50% carotid stenosis bilaterally with bilateral antegrade vertebral flow.    Narrative & Impression   EXAM/TECHNIQUE: CT abdomen with IV contrast     INDICATION: HX RENAL CELL CARCINOMA; C64.2-Malignant neoplasm of left  kidney, except renal pelvis     COMPARISON: None     DLP: 346.21 mGy.cm. Automated exposure control was also utilized to  decrease patient radiation dose.     FINDINGS:     Right hemidiaphragm elevation.  Interstitial reticulation/fibrosis and  emphysema is noted in the lung bases.     Tiny 6 mm hypodense segment 2 liver lesion on image 22 is too small to  characterize but likely a cyst. No solid liver lesion. No gallstone or  gallbladder wall thickening. No biliary ductal dilatation. Pancreas,  spleen, and adrenal glands are unremarkable.     Postoperative change of left nephrectomy. Small area of fat necrosis is  present along the left iliopsoas muscle. No suspicious solid enhancing  nodule in the left nephrectomy bed to suggest local disease recurrence.     2 right renal cysts are present without complex features. No solid  enhancing right renal mass. No right-sided urolithiasis or  hydronephrosis.     No small bowel distention or evidence of active small bowel  inflammation. Visualized portion of the colon is unremarkable without  evidence of focal wall thickening or active inflammatory change.     No ascites. Atherosclerotic ectatic infrarenal abdominal aorta measuring  up to 2.6 cm. Chronic appearing nonflow-limiting calcified dissection in  the distal abdominal aorta. The SMA is patent. No enlarged  retroperitoneal or mesenteric lymph nodes in the abdomen.     No acute abdominal wall soft tissue abnormality. No suspicious osseous  lesion. Partially imaged postoperative change of median sternotomy.  Multilevel lumbar spine degenerative change.     IMPRESSION:     1.  Postoperative change of left nephrectomy without evidence of  recurrent or metastatic disease in the abdomen.     2.  Atherosclerotic ectatic infrarenal abdominal artery measuring up to  2.6 cm diameter. 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     This report was signed and finalized on 3/8/2024 10:34 AM by Dr. Yousif Caban MD.       Patient Active Problem List   Diagnosis   • Coronary artery disease involving native coronary artery of native heart without angina pectoris   • S/P CABG  (coronary artery bypass graft) 1996 [place]    • Mixed hyperlipidemia   • Benign essential HTN   • Family history of early CAD   • Ex-smoker for more than 1 year 1PPD x 40 years quit 16 years ago   • Nonrheumatic aortic valve insufficiency   • Class 1 obesity due to excess calories with serious comorbidity and body mass index (BMI) of 31.0 to 31.9 in adult   • Bilateral carotid artery stenosis   • Preop testing   • Stenosis of right carotid artery   • Carotid stenosis, right   • Abnormal laboratory test   • Arteriosclerosis of arterial coronary artery bypass graft   • Arthralgia   • Benign prostatic hyperplasia with urinary obstruction   • Cardiovascular system problem   • Carotid artery occlusion   • DDD (degenerative disc disease), lumbosacral   • Inflammatory polyarthropathy   • Intermittent claudication   • Osteoarthritis of knee   • Palpitations   • Renal cell carcinoma   • Tendinitis   • BMI 31.0-31.9,adult   • Abnormal nuclear stress test infarction no ischemia         Diagnosis Plan   1. Bilateral carotid artery stenosis  US Carotid Bilateral      2. Mixed hyperlipidemia        3. Benign essential HTN              Plan: After thoroughly evaluating Marika Gutierrez, I believe the best course of action is to remain conservative from vascular surgery standpoint.  Overall he is doing fairly well.  He denies any strokelike symptoms.  I did review his testing which shows less than 50% carotid stenosis bilaterally.  He has previously undergone a left nephrectomy since last visit and testing noted ectasia of infrarenal abdominal aorta with a calcified dissection flap in the distal aorta.  He denies any complaints of claudication to his lower extremities.  We will plan on seeing him back in 1 year with repeat noninvasive testing including a carotid duplex for continued surveillance.  I did discuss vascular risk factors as they pertain to the progression of vascular disease including controlling his hypertension  and hyperlipidemia.  His blood pressure stable on his current medications.  He should continue his Plavix 75 mg daily and Lipitor 40 mg daily in addition to his other medications.  This was all discussed in full with complete understanding.  Thank you for allowing me to participate in the care of your patient.  Please do not hesitate to call with any questions or concerns.  We will keep you aware of any further encounters with Marika Gutierrez.        Sincerely yours,         DONA Méndez Jonathan Patmor, MD

## 2024-08-05 RX ORDER — HYDRALAZINE HYDROCHLORIDE 50 MG/1
50 TABLET, FILM COATED ORAL 2 TIMES DAILY
Qty: 180 TABLET | Refills: 3 | Status: SHIPPED | OUTPATIENT
Start: 2024-08-05

## 2024-08-05 NOTE — TELEPHONE ENCOUNTER
[facility] HAS NOT RECEIVED A REFILL REQUEST FOR HYDRALAZINE FROM [facility] AS OF 8/5/24.

## 2024-10-08 ENCOUNTER — OFFICE VISIT (OUTPATIENT)
Dept: CARDIOLOGY | Facility: CLINIC | Age: 79
End: 2024-10-08
Payer: MEDICARE

## 2024-10-08 VITALS
HEIGHT: 68 IN | DIASTOLIC BLOOD PRESSURE: 52 MMHG | WEIGHT: 204 LBS | SYSTOLIC BLOOD PRESSURE: 107 MMHG | BODY MASS INDEX: 30.92 KG/M2 | HEART RATE: 76 BPM

## 2024-10-08 DIAGNOSIS — E78.2 MIXED HYPERLIPIDEMIA: ICD-10-CM

## 2024-10-08 DIAGNOSIS — Z95.1 S/P CABG (CORONARY ARTERY BYPASS GRAFT): Primary | ICD-10-CM

## 2024-10-08 DIAGNOSIS — I10 BENIGN ESSENTIAL HTN: ICD-10-CM

## 2024-10-08 DIAGNOSIS — I35.1 NONRHEUMATIC AORTIC VALVE INSUFFICIENCY: ICD-10-CM

## 2024-10-08 DIAGNOSIS — R06.09 DOE (DYSPNEA ON EXERTION): ICD-10-CM

## 2024-10-08 DIAGNOSIS — I25.10 CORONARY ARTERY DISEASE INVOLVING NATIVE CORONARY ARTERY OF NATIVE HEART WITHOUT ANGINA PECTORIS: ICD-10-CM

## 2024-10-08 DIAGNOSIS — I65.21 CAROTID STENOSIS, RIGHT: ICD-10-CM

## 2024-10-08 NOTE — PROGRESS NOTES
Marika Gutierrez  [id number]  1945  78 y.o.  male    Referring Provider: Abilio Martinez MD    Reason for  Visit: Here for routine follow up   Echo as below with normal LVEF and moderate aortic insufficiency     Subjective    Overall feels the same   No new events or complaints since last visit   Overall the patient feels no major change from baseline symptoms   Similar symptoms as during last visit       Mild chronic exertional shortness of breath on exertion relieved with rest  No significant cough or wheezing  He gets more shortness of breath especially going uphill  Easy fatiguability and increasing tired  Feels energy levels running low      No palpitations  No associated chest pain  No significant pedal edema    No fever or chills  No significant expectoration    No hemoptysis  No presyncope or syncope    Tolerating current medications well with no untoward side effects   Compliant with prescribed medication regimen. Tries to adhere to cardiac diet.     Easy fatiguability and increasing tired  Feels energy levels running low      BP well controlled at home.   120-130s/60 s mm Hg   BP in clinic as below      Has excessive bruising, on alternate day Plavix     History of present illness:  Marika Gutierrez is a 78 y.o. yo male with copd coronary artery disease coronary artery bypass grafting 1991 who presents today for   Chief Complaint   Patient presents with    Coronary Artery Disease     6 month follow up    .    History  Past Medical History:   Diagnosis Date    Aortic valve regurgitation     Arthritis 2000    Asthma COPD    Cancer     Carotid stenosis     Cataract     right    COPD (chronic obstructive pulmonary disease)     Coronary artery disease     Depression 2011    GERD (gastroesophageal reflux disease)     Hard of hearing     Hyperlipidemia     Hypertension     Intermittent claudication 09/29/2010    PONV (postoperative nausea and vomiting)     Renal cell carcinoma     TIA (transient  ischemic attack)    ,   Past Surgical History:   Procedure Laterality Date    BACK SURGERY      CARDIAC CATHETERIZATION      X 4 -  - 2005 -  -     CAROTID ENDARTERECTOMY Left 2021    Procedure: LEFT CAROTID ENDARTERECTOMY WITH EEG;  Surgeon: Puneet Chang DO;  Location:  [facility];  Service: Vascular;  Laterality: Left;    CAROTID ENDARTERECTOMY Right 10/18/2021    Procedure: RIGHT CAROTID ENDARTERECTOMY WITH EEG;  Surgeon: Puneet Chang DO;  Location:  [facility];  Service: Vascular;  Laterality: Right;    CORONARY ARTERY BYPASS GRAFT      X 4    EXTERNAL EAR SURGERY      NEPHRECTOMY      LEFT - 2017   ,   Family History   Problem Relation Age of Onset    Asthma Father     Heart attack Father     Heart disease Father    ,   Social History     Tobacco Use    Smoking status: Former     Current packs/day: 0.00     Average packs/day: 1.5 packs/day for 27.0 years (40.5 ttl pk-yrs)     Types: Cigarettes     Start date: 1977     Quit date:      Years since quittin.7     Passive exposure: Past    Smokeless tobacco: Former     Types: Chew    Tobacco comments:     Stop 17 years ago   Vaping Use    Vaping status: Never Used   Substance Use Topics    Alcohol use: Not Currently     Comment: None    Drug use: Not Currently   ,     Medications  Current Outpatient Medications   Medication Sig Dispense Refill    alfuzosin (UROXATRAL) 10 MG 24 hr tablet Take 1 tablet by mouth Daily. 90 tablet 3    amLODIPine (NORVASC) 10 MG tablet TAKE 1 TABLET DAILY 90 tablet 3    atorvastatin (LIPITOR) 40 MG tablet Take 1 tablet by mouth Daily. 90 tablet 3    citalopram (CeleXA) 20 MG tablet Take 1 tablet by mouth Daily.      clopidogrel (PLAVIX) 75 MG tablet TAKE 1 TABLET DAILY 90 tablet 3    diazePAM (VALIUM) 5 MG tablet Take 1 tablet by mouth Every Night.      hydrALAZINE (APRESOLINE) 50 MG tablet Take 1 tablet by mouth 2 (Two) Times a Day. 180 tablet 3    isosorbide mononitrate  "(IMDUR) 60 MG 24 hr tablet TAKE 1 TABLET DAILY 90 tablet 3    nitroglycerin (NITROSTAT) 0.4 MG SL tablet PLACE 1 TABLET UNDER THE   TONGUE AND ALLOW TO        DISSOLVE EVERY 5 MINUTES ASNEEDED FOR CHEST PAIN 100 tablet 1    pantoprazole (PROTONIX) 40 MG EC tablet Take 1 tablet by mouth Daily.      predniSONE (DELTASONE) 5 MG tablet Take 1 tablet by mouth Take As Directed. Every other week, currently off of      tamsulosin (FLOMAX) 0.4 MG capsule 24 hr capsule TAKE 1 CAPSULE DAILY 90 capsule 3    triamcinolone (KENALOG) 0.1 % cream Daily As Needed.       No current facility-administered medications for this visit.       Allergies:  Cyclobenzaprine, Hydrocodone-acetaminophen, and Pentazocine    Review of Systems  Review of Systems   Constitutional: Negative.   HENT: Negative.     Eyes: Negative.    Cardiovascular:  Positive for dyspnea on exertion. Negative for chest pain, claudication, cyanosis, irregular heartbeat, leg swelling, near-syncope, orthopnea, palpitations, paroxysmal nocturnal dyspnea and syncope.   Respiratory: Negative.     Endocrine: Negative.    Hematologic/Lymphatic: Negative.    Skin: Negative.    Gastrointestinal:  Negative for anorexia.   Genitourinary: Negative.    Neurological: Negative.    Psychiatric/Behavioral: Negative.         Objective     Physical Exam:  /52   Pulse 76   Ht 172.7 cm (68\")   Wt 92.5 kg (204 lb)   BMI 31.02 kg/m²     Physical Exam  Constitutional:       Appearance: He is well-developed.   HENT:      Head: Normocephalic.   Neck:      Vascular: Normal carotid pulses. No carotid bruit or JVD.      Trachea: No tracheal tenderness or tracheal deviation.   Cardiovascular:      Rate and Rhythm: Regular rhythm.      Pulses: Normal pulses.      Heart sounds: Murmur heard.      Systolic murmur is present with a grade of 2/6.      Diastolic murmur is present with a grade of 1/4.   Pulmonary:      Effort: Pulmonary effort is normal.      Breath sounds: No stridor. "   Abdominal:      Palpations: Abdomen is soft.   Musculoskeletal:      Cervical back: No edema.   Skin:     General: Skin is warm.   Neurological:      Mental Status: He is alert.      Cranial Nerves: No cranial nerve deficit.      Sensory: No sensory deficit.   Psychiatric:         Speech: Speech normal.         Behavior: Behavior normal.         Results Review:        Patient Information    Patient Name  Marika Gutierrez MRN  [id number] Legal Sex  Male  (Age)  1945 (78 y.o.)     Regadenoson Stress Test with Myocardial Perfusion SPECT (Multi Study)    Accession Number: [id number]   Date of Study: 21   Ordering Provider: Pradip Hoskins MD   Clinical Indications: Known Coronary Artery Disease        Interpreting Physicians  Performing Staff   Pradip Hoskins MD Tech: [facility]   Support Staff: Kaci Gamez RN        Interpretation Summary    Left ventricular ejection fraction is normal. (Calculated EF = 63%).  Myocardial perfusion imaging indicates a medium-sized infarct located in the lateral wall with no significant ischemia noted.       Results for orders placed during the hospital encounter of 12/15/23    Adult Transthoracic Echo Complete w/ Color, Spectral and Contrast if necessary per protocol    Interpretation Summary    Left ventricular systolic function is normal. Left ventricular ejection fraction appears to be 56 - 60%.    Left ventricular diastolic function is consistent with (grade I) impaired relaxation.    Left atrial volume is mildly increased.    Moderate aortic valve regurgitation is present.    Mild pulmonary hypertension is present.    Abnormal global longitudinal LV strain (GLS) = -13.6%        ____________________________________________________________________________________________________________________________________________  Health maintenance and recommendations    Low salt/ HTN/ Heart healthy carbohydrate restricted cardiac diet   The patient is advised to  reduce or avoid caffeine or other cardiac stimulants.   Minimize or avoid  NSAID-type medications      Monitor for any signs of bleeding including red or dark stools. Fall precautions.   Advised staying uptodate with immunizations per established standard guidelines.    Offered to give patient  a copy of my notes     Questions were encouraged, asked and answered to the patient's  understanding and satisfaction. Questions if any regarding current medications and side effects, need for refills and importance of compliance to medications stressed.    Reviewed available prior notes, consults, prior visits, laboratory findings, radiology and cardiology relevant reports. Updated chart as applicable. I have reviewed the patient's medical history in detail and updated the computerized patient record as relevant.      Updated patient regarding any new or relevant abnormalities on review of records or any new findings on physical exam. Mentioned to patient about purpose of visit and desirable health short and long term goals and objectives.    Primary to monitor CBC CMP Lipid panel and TSH as applicable    ___________________________________________________________________________________________________________________________________________   Procedures    Assessment & Plan   Diagnoses and all orders for this visit:    1. S/P CABG (coronary artery bypass graft) 1996 [place]  (Primary)    2. Nonrheumatic aortic valve insufficiency  -     Adult Transthoracic Echo Complete w/ Color, Spectral and Contrast if necessary per protocol; Future    3. Mixed hyperlipidemia    4. Coronary artery disease involving native coronary artery of native heart without angina pectoris    5. Carotid stenosis, right    6. Benign essential HTN    7. BLUE (dyspnea on exertion)  -     Adult Transthoracic Echo Complete w/ Color, Spectral and Contrast if necessary per protocol; Future          Plan      Patient expressed understanding  Encouraged and  answered all questions   Discussed with the patient and all questioned fully answered. He will call me if any problems arise.   Discussed results of prior testing with patient : echo and carotid Ultrasound with B/L 50-69 %  myocardial perfusion scan       S/L NTG PRN for chest pain, call me or go to ER if has to use S/L nitroglycerin    Monitor left ventricular ejection fraction and aortic insufficiency by serial echo      Keep f/u with vascular surgery    Stop Plavix and start Aspirin 81 mg daily     Monitor for any signs of bleeding including red or dark stools as well as easy bruisabilty. Fall precautions.   Check BP and heart rates twice daily initially till blood pressures and heart rates under good control and then at least 3x / week,   If blood pressures continue to be well-controlled then can check week a month  at home and bring a recording for review next visit  If BP >130/85 or < 100/60 persistently over 3 reading 30 mins apart or if heart rates persistently above 100 bpm or less than 55 bpm call sooner for evaluation and advise     Keep LDL below 55 mg/dl. Monitor liver and renal functions.   Monitor CBC, CMP, TSH (as indicated) and Lipid Panel by primary   Needs repeat LDL as not target     Orders Placed This Encounter   Procedures    Adult Transthoracic Echo Complete w/ Color, Spectral and Contrast if necessary per protocol     Standing Status:   Future     Standing Expiration Date:   10/8/2025     Scheduling Instructions:      Myocardial strain to be performed       Use newer echo machine     Order Specific Question:   Reason for exam?     Answer:   Dyspnea     Order Specific Question:   Release to patient     Answer:   Routine Release [[id number]]        Bring copies or have primary fax to our office labs and other tests prior to next visit             Return in about 6 months (around 4/8/2025).

## 2024-11-12 ENCOUNTER — TELEPHONE (OUTPATIENT)
Dept: OTOLARYNGOLOGY | Facility: CLINIC | Age: 79
End: 2024-11-12
Payer: MEDICARE

## 2024-11-18 ENCOUNTER — OFFICE VISIT (OUTPATIENT)
Dept: OTOLARYNGOLOGY | Facility: CLINIC | Age: 79
End: 2024-11-18
Payer: MEDICARE

## 2024-11-18 VITALS
SYSTOLIC BLOOD PRESSURE: 108 MMHG | DIASTOLIC BLOOD PRESSURE: 57 MMHG | HEART RATE: 75 BPM | RESPIRATION RATE: 16 BRPM | TEMPERATURE: 99 F | WEIGHT: 203 LBS | HEIGHT: 68 IN | BODY MASS INDEX: 30.77 KG/M2

## 2024-11-18 DIAGNOSIS — H91.93 BILATERAL HEARING LOSS, UNSPECIFIED HEARING LOSS TYPE: ICD-10-CM

## 2024-11-18 DIAGNOSIS — H61.22 IMPACTED CERUMEN OF LEFT EAR: ICD-10-CM

## 2024-11-18 DIAGNOSIS — H62.42 OTOMYCOSIS OF LEFT EAR: Primary | ICD-10-CM

## 2024-11-18 DIAGNOSIS — B36.9 OTOMYCOSIS OF LEFT EAR: Primary | ICD-10-CM

## 2024-11-18 DIAGNOSIS — H93.93 UNSPECIFIED DISORDER OF EAR, BILATERAL: ICD-10-CM

## 2024-11-18 PROCEDURE — 1159F MED LIST DOCD IN RCRD: CPT | Performed by: NURSE PRACTITIONER

## 2024-11-18 PROCEDURE — 3074F SYST BP LT 130 MM HG: CPT | Performed by: NURSE PRACTITIONER

## 2024-11-18 PROCEDURE — 99213 OFFICE O/P EST LOW 20 MIN: CPT | Performed by: NURSE PRACTITIONER

## 2024-11-18 PROCEDURE — 3078F DIAST BP <80 MM HG: CPT | Performed by: NURSE PRACTITIONER

## 2024-11-18 PROCEDURE — 1160F RVW MEDS BY RX/DR IN RCRD: CPT | Performed by: NURSE PRACTITIONER

## 2024-11-18 PROCEDURE — 69210 REMOVE IMPACTED EAR WAX UNI: CPT | Performed by: NURSE PRACTITIONER

## 2024-11-18 RX ORDER — ASPIRIN 81 MG/1
81 TABLET, CHEWABLE ORAL DAILY
COMMUNITY

## 2024-11-18 NOTE — PROGRESS NOTES
DONA Tian  [facility]  [address]  [facility] [address]  Fax [phone]  Phone [phone]      Visit Type: FOLLOW UP   Chief Complaint   Patient presents with    Ear Problem           HISTORY OBTAINED FROM: patient  HPI  Marika Gutierrez is a 79 y.o. male who presents for evaluation of ear complaints, possible ear cleanout.  He has been seen in the past for cerumen impaction and otomycosis of the ear.  Last visit was over a year ago.  He reports he has occasional ear drainage, intermittent ear pressure, decreased hearing in the ears bilaterally but more notable to the left ear.  He denies significant ear pain or dizziness.  He does report notable noise exposure, worked as a  and was exposed to explosions and frequent loud noises.  He does report he had hearing test years ago and believes he had a difference between the ears but he feels like his hearing has progressively worsened.  He denies sudden change.    Past Medical History:   Diagnosis Date    Aortic valve regurgitation     Arthritis 2000    Asthma COPD    Cancer     Carotid stenosis     Cataract     right    COPD (chronic obstructive pulmonary disease)     Coronary artery disease     Depression 2011    GERD (gastroesophageal reflux disease)     Hard of hearing     Hyperlipidemia     Hypertension     Intermittent claudication 09/29/2010    PONV (postoperative nausea and vomiting)     Renal cell carcinoma     TIA (transient ischemic attack)        Past Surgical History:   Procedure Laterality Date    BACK SURGERY      CARDIAC CATHETERIZATION      X 4 - 2010 - 2005 - 2003 - 1996    CAROTID ENDARTERECTOMY Left 08/18/2021    Procedure: LEFT CAROTID ENDARTERECTOMY WITH EEG;  Surgeon: Puneet Chang DO;  Location: [place] [address];  Service: Vascular;  Laterality: Left;    CAROTID ENDARTERECTOMY Right 10/18/2021    Procedure: RIGHT CAROTID  ENDARTERECTOMY WITH EEG;  Surgeon: Puneet Chang DO;  Location: [facility];  Service: Vascular;  Laterality: Right;    CORONARY ARTERY BYPASS GRAFT      X 4    EXTERNAL EAR SURGERY      NEPHRECTOMY      LEFT - 2017       Family History: His family history includes Asthma in his father; Heart attack in his father; Heart disease in his father.     Social History: He  reports that he quit smoking about 20 years ago. His smoking use included cigarettes. He started smoking about 47 years ago. He has a 40.5 pack-year smoking history. He has been exposed to tobacco smoke. He has quit using smokeless tobacco.  His smokeless tobacco use included chew. He reports that he does not currently use alcohol. He reports that he does not currently use drugs.    Home Medications:  alfuzosin, amLODIPine, aspirin, atorvastatin, citalopram, diazePAM, hydrALAZINE, isosorbide mononitrate, nitroglycerin, pantoprazole, predniSONE, tamsulosin, and triamcinolone    Allergies:  He is allergic to cyclobenzaprine, hydrocodone-acetaminophen, and pentazocine.       Vital Signs:   Temp:  [99 °F (37.2 °C)] 99 °F (37.2 °C)  Heart Rate:  [75] 75  Resp:  [16] 16  BP: (108)/(57) 108/57  ENT Physical Exam  Constitutional  Appearance: patient appears well-developed, well-nourished and well-groomed,  Communication/Voice: communication appropriate for developmental age; vocal quality normal;  Head and Face  Appearance: head appears normal, face appears normal and face appears atraumatic;  Ear  Hearing: intact;  Auricles: bilateral auricles normal;  External Mastoids: bilateral external mastoids normal;  Ear Canals: right ear canal normal; left ear canal impacted cerumen observed; Ear Canal comments: Cerumen impaction left ear, mostly crusted, some possible mild fungal growth, no significant drainage or inflammation.  Tympanic Membranes: bilateral tympanic membranes normal;  Nose  External Nose: nares patent bilaterally; external nose  normal;  Oral Cavity/Oropharynx  Lips: normal;       Audio at follow-up  Audio at follow-upEar Microscopy with Left Cerumen Removal    Date/Time: 11/18/2024 1:41 PM    Performed by: Pollo Young APRN  Authorized by: Pollo Young APRN    Ear microscopy was indicated due to examination of tympanic membrane and hearing problem.     Consent was given by the patient. The risks of the procedure were discussed including but not limited to bleeding, nerve damage, hearing loss/ tinnitus, tympanic membrane perforation, infection, otorrhea, vertigo, pain, scarring and aural fullness. Alternatives were discussed, including no treatment, delayed treatment, observation and alternative treatments. After discussion of the risks and benefits, questions were allowed and answered until understanding was demonstrated. Consent to perform the procedure was obtained through verbal consent consent.     Ear examination was performed utilizing binocular microscopy.  Right auricle:   normal:   Right ear canal:   normal:   Right tympanic membrane:   normal:   Left auricle:   normal:   Left ear canal:   impacted cerumen, Cerumen impaction with notable crusting across TM, crusting across tm, possible mild fungal growth, no notable drainage or inflammation.   Left tympanic membrane:   normal:     Procedure:    Cerumen was removed from the left ear with a suction.   Post-procedure details:     Inspection after the procedure revealed an intact TM.    No medication was applied to the ear canal.    The procedure was tolerated well, no immediate complications.     Result Review       RESULTS REVIEW    I have reviewed the patients old records in the chart.   The following results/records were reviewed:     Office Visit with Leana Peralta APRN (09/25/2023)          Assessment & Plan  Otomycosis of left ear    Impacted cerumen of left ear    Bilateral hearing loss, unspecified hearing loss type    Unspecified disorder of ear, bilateral        Orders Placed This Encounter   Procedures    $ Binocular Microscopy    Comprehensive Hearing Test           Medical and surgical options were discussed including observation, continued medical management, medication modification, and surgical management. Risks, benefits and alternatives were discussed and questions were answered. After considering the options, the patient decided to proceed with medication modification.  Call for ear problems, especially change of hearing, ear pain or dizziness.  For the best results, use nasal sprays every day. It may take a week to build up in the nasal lining and a few more weeks to improve the eustachian tube function.  Protect getting water in the ears. If needed, may use over the counter silicone plugs or a cotton ball coated with vasoline when bathing.  Use vineger water irrigation- 3-4 drops with a bulb syringe three times a day as needed. Make the mix of 1:1 mixture of white vineger and water. Microwave to heat and sterilize. Let mixture get to room temperature before using. Keep mix in refrigerator and remix every 3-5 days to keep fresh.   Use hairdryer on a cool setting after bathing.  Use hearing protection in loud noise situations.  Water precautions  Alternate oil and vinegar care 2 times a week 2 times a week until follow-up  Audio at follow upAudio at follow-up      Call with question or concerns    Return in about 6 weeks (around 12/30/2024) for Recheck with audio.        Electronically signed by DONA Tian 11/18/24 1:41 PM CST.

## 2024-11-18 NOTE — PATIENT INSTRUCTIONS
EAR CARE: :using oil  Use a hair dryer on low heat and blow into the ear canals 2 times daily to keep ears dry.  DO Not use Q tips or Senait pins, ever!!  Do not use alcohol in the ear canal (this will cause dryness and itching)  NO peroxide or alcohol in ears  Oil: Use Sweet oil, Olive oil, or Mineral oil, purchased over the counter, 2 to 3 times a week, Do not use Q tips, You may use a hair dryer on low heat. Blow in ears for 10-15 seconds 2 times daily to dry ear canals or if ear canals are itching.    Use plain white vinegar, Alternate Vinegar with oil, 2 to 3 times a week

## 2024-11-27 RX ORDER — NITROGLYCERIN 0.4 MG/1
TABLET SUBLINGUAL
Qty: 100 TABLET | Refills: 1 | Status: SHIPPED | OUTPATIENT
Start: 2024-11-27

## 2024-12-18 ENCOUNTER — HOSPITAL ENCOUNTER (OUTPATIENT)
Dept: CARDIOLOGY | Facility: HOSPITAL | Age: 79
Discharge: HOME | End: 2024-12-18
Admitting: INTERNAL MEDICINE
Payer: MEDICARE

## 2024-12-18 VITALS
WEIGHT: 203 LBS | SYSTOLIC BLOOD PRESSURE: 108 MMHG | HEIGHT: 68 IN | BODY MASS INDEX: 30.77 KG/M2 | DIASTOLIC BLOOD PRESSURE: 57 MMHG

## 2024-12-18 DIAGNOSIS — I35.1 NONRHEUMATIC AORTIC VALVE INSUFFICIENCY: ICD-10-CM

## 2024-12-18 DIAGNOSIS — R06.09 DOE (DYSPNEA ON EXERTION): ICD-10-CM

## 2024-12-18 PROCEDURE — 93306 TTE W/DOPPLER COMPLETE: CPT

## 2024-12-18 PROCEDURE — 93356 MYOCRD STRAIN IMG SPCKL TRCK: CPT

## 2024-12-21 LAB
BH CV ECHO LEFT VENTRICLE GLOBAL LONGITUDINAL STRAIN: -16.4 %
BH CV ECHO MEAS - AI P1/2T: 347 MSEC
BH CV ECHO MEAS - AO MAX PG: 9.8 MMHG
BH CV ECHO MEAS - AO MEAN PG: 5.4 MMHG
BH CV ECHO MEAS - AO ROOT DIAM: 3 CM
BH CV ECHO MEAS - AO V2 MAX: 156.3 CM/SEC
BH CV ECHO MEAS - AO V2 VTI: 34.2 CM
BH CV ECHO MEAS - AVA(I,D): 2.9 CM2
BH CV ECHO MEAS - EDV(CUBED): 90 ML
BH CV ECHO MEAS - EDV(MOD-SP4): 114.1 ML
BH CV ECHO MEAS - EF(MOD-SP4): 55.1 %
BH CV ECHO MEAS - ESV(CUBED): 12.8 ML
BH CV ECHO MEAS - ESV(MOD-SP4): 51.3 ML
BH CV ECHO MEAS - FS: 47.8 %
BH CV ECHO MEAS - IVS/LVPW: 1.14 CM
BH CV ECHO MEAS - IVSD: 1.12 CM
BH CV ECHO MEAS - LA DIMENSION: 3.7 CM
BH CV ECHO MEAS - LAT PEAK E' VEL: 14 CM/SEC
BH CV ECHO MEAS - LV DIASTOLIC VOL/BSA (35-75): 55.5 CM2
BH CV ECHO MEAS - LV MASS(C)D: 163.8 GRAMS
BH CV ECHO MEAS - LV MAX PG: 5.2 MMHG
BH CV ECHO MEAS - LV MEAN PG: 2.7 MMHG
BH CV ECHO MEAS - LV SYSTOLIC VOL/BSA (12-30): 24.9 CM2
BH CV ECHO MEAS - LV V1 MAX: 114.4 CM/SEC
BH CV ECHO MEAS - LV V1 VTI: 21.7 CM
BH CV ECHO MEAS - LVIDD: 4.5 CM
BH CV ECHO MEAS - LVIDS: 2.34 CM
BH CV ECHO MEAS - LVOT AREA: 4.6 CM2
BH CV ECHO MEAS - LVOT DIAM: 2.41 CM
BH CV ECHO MEAS - LVPWD: 0.99 CM
BH CV ECHO MEAS - MED PEAK E' VEL: 9 CM/SEC
BH CV ECHO MEAS - MV A MAX VEL: 92.4 CM/SEC
BH CV ECHO MEAS - MV DEC SLOPE: 329.8 CM/SEC2
BH CV ECHO MEAS - MV DEC TIME: 0.23 SEC
BH CV ECHO MEAS - MV E MAX VEL: 77.2 CM/SEC
BH CV ECHO MEAS - MV E/A: 0.84
BH CV ECHO MEAS - RAP SYSTOLE: 3 MMHG
BH CV ECHO MEAS - RVSP: 34.3 MMHG
BH CV ECHO MEAS - SV(LVOT): 98.7 ML
BH CV ECHO MEAS - SV(MOD-SP4): 62.8 ML
BH CV ECHO MEAS - SVI(LVOT): 48 ML/M2
BH CV ECHO MEAS - SVI(MOD-SP4): 30.5 ML/M2
BH CV ECHO MEAS - TR MAX PG: 31.3 MMHG
BH CV ECHO MEAS - TR MAX VEL: 279.7 CM/SEC
BH CV ECHO MEASUREMENTS AVERAGE E/E' RATIO: 6.71
LEFT ATRIUM VOLUME INDEX: 30.8 ML/M2
LEFT ATRIUM VOLUME: 63 ML

## 2025-01-08 ENCOUNTER — OFFICE VISIT (OUTPATIENT)
Dept: OTOLARYNGOLOGY | Facility: CLINIC | Age: 80
End: 2025-01-08
Payer: MEDICARE

## 2025-01-08 ENCOUNTER — PROCEDURE VISIT (OUTPATIENT)
Dept: OTOLARYNGOLOGY | Facility: CLINIC | Age: 80
End: 2025-01-08
Payer: MEDICARE

## 2025-01-08 VITALS
WEIGHT: 186 LBS | DIASTOLIC BLOOD PRESSURE: 57 MMHG | HEART RATE: 66 BPM | BODY MASS INDEX: 28.19 KG/M2 | SYSTOLIC BLOOD PRESSURE: 112 MMHG | TEMPERATURE: 97.8 F | HEIGHT: 68 IN

## 2025-01-08 DIAGNOSIS — H93.13 TINNITUS, BILATERAL: ICD-10-CM

## 2025-01-08 DIAGNOSIS — H62.42 OTOMYCOSIS OF LEFT EAR: ICD-10-CM

## 2025-01-08 DIAGNOSIS — H90.3 SENSORINEURAL HEARING LOSS, BILATERAL: Primary | ICD-10-CM

## 2025-01-08 DIAGNOSIS — B36.9 OTOMYCOSIS OF LEFT EAR: ICD-10-CM

## 2025-01-08 DIAGNOSIS — H92.12 OTORRHEA OF LEFT EAR: ICD-10-CM

## 2025-01-08 NOTE — PROGRESS NOTES
DONA Tian  MGLEATHA ENT [facility] EAR NOSE & THROAT  [address], [address]  [place] [address]  Fax [phone]  Phone [phone]      Visit Type: FOLLOW UP   Chief Complaint   Patient presents with    Hearing Loss     Bilateral-hearing gradually getting worse           HISTORY OBTAINED FROM: patient  HPI  Marika Gutierrez is a 79 y.o. male who presents for follow-up evaluation, recheck ears and hearing evaluation.  He reports since last visit he has done fairly well, reports some mild drainage but not often. Denies significant itching. Denies new symptoms including ear pain, pressure, fullness or dizziness.  Audio obtained today, reviewed in office.       Past Medical History:   Diagnosis Date    Aortic valve regurgitation     Arthritis 2000    Asthma COPD    Cancer     Carotid stenosis     Cataract     right    COPD (chronic obstructive pulmonary disease)     Coronary artery disease     Depression 2011    GERD (gastroesophageal reflux disease)     Hard of hearing     Hyperlipidemia     Hypertension     Intermittent claudication 09/29/2010    PONV (postoperative nausea and vomiting)     Renal cell carcinoma     TIA (transient ischemic attack)        Past Surgical History:   Procedure Laterality Date    BACK SURGERY      CARDIAC CATHETERIZATION      X 4 - 2010 - 2005 - 2003 - 1996    CAROTID ENDARTERECTOMY Left 08/18/2021    Procedure: LEFT CAROTID ENDARTERECTOMY WITH EEG;  Surgeon: Puneet Chang DO;  Location: [facility] OR 12;  Service: Vascular;  Laterality: Left;    CAROTID ENDARTERECTOMY Right 10/18/2021    Procedure: RIGHT CAROTID ENDARTERECTOMY WITH EEG;  Surgeon: Puneet Chang DO;  Location: [facility] HYBRID OR 12;  Service: Vascular;  Laterality: Right;    CORONARY ARTERY BYPASS GRAFT      X 4    EXTERNAL EAR SURGERY      NEPHRECTOMY      LEFT - 2017       Family History: His family history includes Asthma in his father; Heart  attack in his father; Heart disease in his father.     Social History: He  reports that he quit smoking about 21 years ago. His smoking use included cigarettes. He started smoking about 48 years ago. He has a 40.5 pack-year smoking history. He has been exposed to tobacco smoke. He has quit using smokeless tobacco.  His smokeless tobacco use included chew. He reports that he does not currently use alcohol. He reports that he does not currently use drugs.    Home Medications:  alfuzosin, amLODIPine, aspirin, atorvastatin, citalopram, diazePAM, hydrALAZINE, isosorbide mononitrate, nitroglycerin, pantoprazole, predniSONE, tamsulosin, and triamcinolone    Allergies:  He is allergic to cyclobenzaprine, hydrocodone-acetaminophen, and pentazocine.       Vital Signs:   Temp:  [97.8 °F (36.6 °C)] 97.8 °F (36.6 °C)  Heart Rate:  [66] 66  BP: (112)/(57) 112/57  ENT Physical Exam  Constitutional  Appearance: patient appears well-developed, well-nourished and well-groomed,  Communication/Voice: communication appropriate for developmental age; vocal quality normal;  Head and Face  Appearance: head appears normal, face appears normal and face appears atraumatic;  Ear  Hearing: intact;  Auricles: bilateral auricles normal;  External Mastoids: bilateral external mastoids normal;  Ear Canals: right ear canal normal; left ear canal impacted cerumen observed; Ear Canal comments: Cerumen impaction left ear, mostly crusted, some possible mild fungal growth, no significant drainage or inflammation.  Tympanic Membranes: bilateral tympanic membranes normal;  Nose  External Nose: nares patent bilaterally; external nose normal;  Oral Cavity/Oropharynx  Lips: normal;  Neck  Neck: neck normal; neck palpation normal;  Respiratory  Inspection: breathing unlabored; normal breathing rate;  Neurovestibular  Mental Status: alert and oriented;           Result Review       RESULTS REVIEW    I have reviewed the patients old records in the chart.   The  following results/records were reviewed:     Procedure visit with Janette Bland Au.D (01/08/2025)          Assessment & Plan  Sensorineural hearing loss, bilateral    Tinnitus, bilateral    Otomycosis of left ear    Otorrhea of left ear                Obtain reviewed in office with patient.  Reveals fairly significant bilateral sensorineural hearing loss.  Fairly prominent impaired auditory discrimination bilaterally with 40% on the right and 36% on left.  Tympanometry is type a on left and AD on right.  Otoscopic exam today is relatively normal.  Given current audio scores he would benefit most from possible implant center amplification surgery.  He states he is not at all interested in this at this time.  He is interested in basic hearing aids, I did advise that these would have limited if any improvement on his hearing given his auditory discrimination but he still states that he wishes to least explore this option.  I will plan to follow-up with him routinely for possible cerumen removal and to monitor ears due to otomycosis history, will recheck this in about 3 months.  Otherwise we will follow-up with yearly audios.  He will call with any questions or concerns develop new symptoms.      Medical and surgical options were discussed including observation, continued medical management, medication modification, surgical management, university referral, and hearing aid trial. Risks, benefits and alternatives were discussed and questions were answered. After considering the options, the patient decided to proceed with observation and hearing aid trial.  Call for ear problems, especially change of hearing, ear pain or dizziness.  For the best results, use nasal sprays every day. It may take a week to build up in the nasal lining and a few more weeks to improve the eustachian tube function.  Protect getting water in the ears. If needed, may use over the counter silicone plugs or a cotton ball coated with vasoline  when bathing.  Use hairdryer on a cool setting after bathing.  Use hearing protection in loud noise situations.  Continue water precautions  Oil and vinegar care to ears  Obtain yearly audio    Call with questions or concerns    Return in about 3 months (around 4/8/2025) for Recheck ears.        Electronically signed by DONA Tian 01/08/25 2:53 PM CST.

## 2025-01-08 NOTE — PROGRESS NOTES
AUDIOMETRIC EVALUATION      Name:  Marika Gutierrez  :  1945  Age:  79 y.o.  Date of Evaluation:  2025       History:  Mr. Gutierrez is seen today for a hearing evaluation due to decreased hearing at the request of DONA López.    Audiologic Information:  Concerns for Hearing: Yes   PETs: no  Other otologic surgical history: History of eardrum surgery in the left ear   Aural Pressure/Fullness: no  Otalgia: Left ear intermittent   Otorrhea: Bilateral intermittent, last incidence was last night   Tinnitus: Bilateral constant   Dizziness: Off-balance dizziness that is triggered by bending over or back   Noise Exposure: worked as a  and was exposed to explosions and frequent loud noises.   Family history of hearing loss: no  Head trauma requiring hospital stay: no  Chemotherapy: history Kidney cancer, but no chemotherapy   Other significant history: High blood pressure, history of heart surgery and heart complications, history of multiple mini-strokes     **Case history obtained in office and through EMR system      EVALUATION:        RESULTS:    Otoscopic Evaluation:  Right: clear canal, tympanic membrane visualized  Left: clear canal, tympanic membrane visualized    Tympanometry (226 Hz):  Right: Type A  Left: Type A      Pure Tone Audiometry:    Bilateral: Normal sloping to severe sensorineural hearing loss     Speech Audiometry:   Right: Speech Reception Threshold (SRT) was obtained at 40 dB HL  Word Recognition scores - Very Poor (40)% at 80 dB with 60 dB of contralateral masking, using NU-6 List 1A with 25 words  Left: Speech Reception Threshold (SRT) was obtained at 45 dB HL  Word Recognition scores - Very Poor (36)% at 85 dB with 65 dB of contralateral masking, using NU-6 List 2A with 25 words  SRT/PTA in good agreement bilaterally.    IMPRESSIONS:  Tympanometry showed normal middle ear pressure and static compliance, consistent with normal middle ear function for both ears.       Pure tone thresholds for both ears show a steeply sloping severe sensorineural hearing loss, suggesting normal outer/middle ear function and abnormal cochlear/retrocochlear function.       Patient was counseled with regard to the findings.    Amplification needs: Patient could benefit from hearing aids. It is of note that the hearing aids would benefit hearing surroundings, but would not provide clarity to speech. Patient confirmed that he was not interested in cochlear implants.     Diagnosis:  1. Sensorineural hearing loss, bilateral    2. Tinnitus, bilateral         RECOMMENDATIONS/PLAN:  Follow-up recommendations per DONA López.  Practice good communication strategies to assist with everyday listening (eye contact with speakers, reduce background noise, encourage others to communicate clearly and slowly).  Hearing aid evaluation and counseling upon medical clearance and patient motivation.   Repeat hearing evaluation if changes in hearing are noted or concerns arise.  Discussed results and recommendations with patient. Questions were addressed and the patient was encouraged to contact our department should concerns arise.        Janette Paniagua, CCC-A  Doctor of Audiology

## 2025-02-27 RX ORDER — CLOPIDOGREL BISULFATE 75 MG/1
TABLET ORAL
OUTPATIENT
Start: 2025-02-27

## 2025-03-18 ENCOUNTER — TELEPHONE (OUTPATIENT)
Dept: UROLOGY | Facility: CLINIC | Age: 80
End: 2025-03-18
Payer: MEDICARE

## 2025-03-18 DIAGNOSIS — C64.2 RENAL CELL CARCINOMA OF LEFT KIDNEY: ICD-10-CM

## 2025-03-18 NOTE — TELEPHONE ENCOUNTER
Caller: ELEAZAR CADENA     Relationship: [facility]IGN[facility] OTHER    Best call back number: [phone]     What orders are you requesting (i.e. lab or imaging): LABS AND IMAGING    In what timeframe would the patient need to come in: PRIOR TO APPT IN APRIL    Where will you receive your lab/imaging services:     Additional notes: PT'S [facility] OTHER CALLED TO SCHEDULE APPT FROM RECALL LETTER. APPT NOTES STATE PT NEEDS CT AND LABS PRIOR, NO ORDER IN CHART.

## 2025-03-19 RX ORDER — CLOPIDOGREL BISULFATE 75 MG/1
75 TABLET ORAL DAILY
OUTPATIENT
Start: 2025-03-19

## 2025-03-24 DIAGNOSIS — C64.2 RENAL CELL CARCINOMA OF LEFT KIDNEY: ICD-10-CM

## 2025-03-24 RX ORDER — TAMSULOSIN HYDROCHLORIDE 0.4 MG/1
1 CAPSULE ORAL DAILY
Qty: 90 CAPSULE | Refills: 3 | Status: SHIPPED | OUTPATIENT
Start: 2025-03-24

## 2025-03-27 RX ORDER — NITROGLYCERIN 0.4 MG/1
0.4 TABLET SUBLINGUAL
Qty: 100 TABLET | Refills: 1 | Status: SHIPPED | OUTPATIENT
Start: 2025-03-27

## 2025-03-27 RX ORDER — HYDRALAZINE HYDROCHLORIDE 50 MG/1
50 TABLET, FILM COATED ORAL 2 TIMES DAILY
Qty: 180 TABLET | Refills: 3 | Status: SHIPPED | OUTPATIENT
Start: 2025-03-27

## 2025-03-27 RX ORDER — AMLODIPINE BESYLATE 10 MG/1
10 TABLET ORAL DAILY
Qty: 90 TABLET | Refills: 3 | Status: SHIPPED | OUTPATIENT
Start: 2025-03-27

## 2025-03-27 RX ORDER — ISOSORBIDE MONONITRATE 60 MG/1
60 TABLET, EXTENDED RELEASE ORAL DAILY
Qty: 90 TABLET | Refills: 3 | Status: SHIPPED | OUTPATIENT
Start: 2025-03-27

## 2025-03-28 ENCOUNTER — TELEPHONE (OUTPATIENT)
Dept: OTOLARYNGOLOGY | Facility: CLINIC | Age: 80
End: 2025-03-28
Payer: MEDICARE

## 2025-03-31 ENCOUNTER — OFFICE VISIT (OUTPATIENT)
Dept: OTOLARYNGOLOGY | Facility: CLINIC | Age: 80
End: 2025-03-31
Payer: MEDICARE

## 2025-03-31 VITALS
SYSTOLIC BLOOD PRESSURE: 148 MMHG | TEMPERATURE: 98 F | WEIGHT: 205 LBS | HEIGHT: 68 IN | BODY MASS INDEX: 31.07 KG/M2 | DIASTOLIC BLOOD PRESSURE: 52 MMHG

## 2025-03-31 DIAGNOSIS — H93.8X3 EAR PRESSURE, BILATERAL: ICD-10-CM

## 2025-03-31 DIAGNOSIS — H93.13 TINNITUS, BILATERAL: ICD-10-CM

## 2025-03-31 DIAGNOSIS — H90.3 SENSORINEURAL HEARING LOSS, BILATERAL: Primary | ICD-10-CM

## 2025-03-31 PROCEDURE — 3077F SYST BP >= 140 MM HG: CPT | Performed by: NURSE PRACTITIONER

## 2025-03-31 PROCEDURE — G2211 COMPLEX E/M VISIT ADD ON: HCPCS | Performed by: NURSE PRACTITIONER

## 2025-03-31 PROCEDURE — 3078F DIAST BP <80 MM HG: CPT | Performed by: NURSE PRACTITIONER

## 2025-03-31 PROCEDURE — 99213 OFFICE O/P EST LOW 20 MIN: CPT | Performed by: NURSE PRACTITIONER

## 2025-03-31 RX ORDER — ATORVASTATIN CALCIUM 80 MG/1
80 TABLET, FILM COATED ORAL DAILY
COMMUNITY
Start: 2025-01-09

## 2025-03-31 RX ORDER — CLINDAMYCIN PHOSPHATE 10 MG/G
GEL TOPICAL DAILY
COMMUNITY
Start: 2025-03-07

## 2025-03-31 RX ORDER — CLOPIDOGREL BISULFATE 75 MG/1
1 TABLET ORAL DAILY
COMMUNITY
Start: 2024-12-03

## 2025-03-31 RX ORDER — FLUTICASONE PROPIONATE 50 MCG
2 SPRAY, SUSPENSION (ML) NASAL DAILY
Qty: 16 G | Refills: 2 | Status: SHIPPED | OUTPATIENT
Start: 2025-03-31

## 2025-03-31 NOTE — PROGRESS NOTES
DONA Tian  LEATHA ENT [facility] EAR NOSE & THROAT  [address]  [place] [address]  Fax [phone]  Phone [phone]      Visit Type: FOLLOW UP   Chief Complaint   Patient presents with    Hearing Loss     F/u on bilateral hearing loss.            HISTORY OBTAINED FROM: patient  HPI  Marika Gutierrez is a 79 y.o. male who presents for evaluation, recheck of ears and hearing complaints.  Patient has chronic hearing loss and impaired auditory discrimination. Symptoms moderate to severe.  Has been present for years.  Localized ears bilaterally.      Since last visit he reports he has continue to do well. Denies significant return of ear drainage or pain. Does admit some ear pressure, usually improved with pop ears. Denies new symptoms. No changes in hearing. He did not pursue hearing aids.     Past Medical History:   Diagnosis Date    Aortic valve regurgitation     Arthritis 2000    Asthma COPD    Cancer     Carotid stenosis     Cataract     right    COPD (chronic obstructive pulmonary disease)     Coronary artery disease     Depression 2011    GERD (gastroesophageal reflux disease)     Hard of hearing     Hyperlipidemia     Hypertension     Intermittent claudication 09/29/2010    PONV (postoperative nausea and vomiting)     Renal cell carcinoma     TIA (transient ischemic attack)        Past Surgical History:   Procedure Laterality Date    BACK SURGERY      CARDIAC CATHETERIZATION      X 4 - 2010 - 2005 - 2003 - 1996    CAROTID ENDARTERECTOMY Left 08/18/2021    Procedure: LEFT CAROTID ENDARTERECTOMY WITH EEG;  Surgeon: Puneet Chang DO;  Location: [facility] OR ;  Service: Vascular;  Laterality: Left;    CAROTID ENDARTERECTOMY Right 10/18/2021    Procedure: RIGHT CAROTID ENDARTERECTOMY WITH EEG;  Surgeon: Puneet Chang DO;  Location: [facility] HYBRID OR 12;  Service: Vascular;  Laterality: Right;    CORONARY ARTERY BYPASS  GRAFT      X 4    EXTERNAL EAR SURGERY      NEPHRECTOMY      LEFT - 2017       Family History: His family history includes Asthma in his father; Heart attack in his father; Heart disease in his father.     Social History: He  reports that he quit smoking about 21 years ago. His smoking use included cigarettes. He started smoking about 48 years ago. He has a 40.5 pack-year smoking history. He has been exposed to tobacco smoke. He has quit using smokeless tobacco.  His smokeless tobacco use included chew. He reports that he does not currently use alcohol. He reports that he does not currently use drugs.    Home Medications:  alfuzosin, amLODIPine, aspirin, atorvastatin, citalopram, clindamycin, clopidogrel, diazePAM, fluticasone, hydrALAZINE, isosorbide mononitrate, nitroglycerin, pantoprazole, predniSONE, tamsulosin, and triamcinolone    Allergies:  He is allergic to cyclobenzaprine, hydrocodone-acetaminophen, and pentazocine.       Vital Signs:   Temp:  [98 °F (36.7 °C)] 98 °F (36.7 °C)  BP: (148)/(52) 148/52  ENT Physical Exam  Constitutional  Appearance: patient appears well-developed, well-nourished and well-groomed,  Communication/Voice: communication appropriate for developmental age; vocal quality normal;  Head and Face  Appearance: head appears normal, face appears normal and face appears atraumatic;  Ear  Hearing: intact;  Auricles: bilateral auricles normal;  External Mastoids: bilateral external mastoids normal;  Ear Canals: right ear canal normal; left ear canal impacted cerumen observed; Ear Canal comments: Cerumen impaction left ear, mostly crusted, some possible mild fungal growth, no significant drainage or inflammation.  Tympanic Membranes: bilateral tympanic membranes normal;  Nose  External Nose: nares patent bilaterally; external nose normal;  Oral Cavity/Oropharynx  Lips: normal;  Neck  Neck: neck normal; neck palpation normal;  Respiratory  Inspection: breathing unlabored; normal breathing  rate;  Neurovestibular  Mental Status: alert and oriented;           Result Review       RESULTS REVIEW    I have reviewed the patients old records in the chart.   The following results/records were reviewed:              Assessment & Plan  Sensorineural hearing loss, bilateral    Tinnitus, bilateral    Ear pressure, bilateral         Medical and surgical options were discussed including observation, continued medical management, medication modification, and surgical management. Risks, benefits and alternatives were discussed and questions were answered. After considering the options, the patient decided to proceed with observation.  Call for ear problems, especially change of hearing, ear pain or dizziness.  For the best results, use nasal sprays every day. It may take a week to build up in the nasal lining and a few more weeks to improve the eustachian tube function.  Protect getting water in the ears. If needed, may use over the counter silicone plugs or a cotton ball coated with vasoline when bathing.  Use hairdryer on a cool setting after bathing.  Use hearing protection in loud noise situations.  Use home masking techniques- use low sound level of a pleasant sound like a fan or radio at night to drown out the tinnitus sound. If not working, can consider formal masking device through our hearing aid department.  Continue home tinnitus recommendations  Trial flonase for ear pressure  Continue with yearly audio for monitoring.     Call with questions or concerns    Return in about 6 months (around 9/30/2025) for Recheck audio.        Electronically signed by DONA Tian 03/31/25 11:26 AM CDT.

## 2025-03-31 NOTE — PROGRESS NOTES
Chief Complaint  Hx kidney cancer    Subjective          Marika Gutierrez presents to [facility]   History of renal cell carcinoma.  Underwent radical nephrectomy in [facility] for a 6 cm clear-cell renal cell carcinoma that was Sharda grade 2.I initially took over his care in June 2021.   The patient denies any hematuria, flank pain patient denies gross hematuria, flank pain, abdominal pain, night sweats, unexplained weight loss, unusual musculoskeletal pain, hemoptysis, or unexplained shortness of breath.      Hx  10/2017: left hand assisted lap radical nephrectomy ([place], [place])  Incidental left renal mass found when pt had acute diverticulitis on CT   6x6x5.7 cm clear cell renal cell carcinoma - pT1b,n0,m0; Grade 2              Current Outpatient Medications:     alfuzosin (UROXATRAL) 10 MG 24 hr tablet, Take 1 tablet by mouth Daily., Disp: 90 tablet, Rfl: 3    amLODIPine (NORVASC) 10 MG tablet, Take 1 tablet by mouth Daily., Disp: 90 tablet, Rfl: 3    aspirin 81 MG chewable tablet, Chew 1 tablet Daily., Disp: , Rfl:     atorvastatin (LIPITOR) 80 MG tablet, Take 1 tablet by mouth Daily., Disp: , Rfl:     citalopram (CeleXA) 20 MG tablet, Take 1 tablet by mouth Daily., Disp: , Rfl:     clindamycin 1 % gel, Daily., Disp: , Rfl:     clopidogrel (PLAVIX) 75 MG tablet, Take 1 tablet by mouth Daily., Disp: , Rfl:     diazePAM (VALIUM) 5 MG tablet, Take 1 tablet by mouth Every Night., Disp: , Rfl:     fluticasone (FLONASE) 50 MCG/ACT nasal spray, Administer 2 sprays into the nostril(s) as directed by provider Daily., Disp: 16 g, Rfl: 2    hydrALAZINE (APRESOLINE) 50 MG tablet, Take 1 tablet by mouth 2 (Two) Times a Day., Disp: 180 tablet, Rfl: 3    isosorbide mononitrate (IMDUR) 60 MG 24 hr tablet, Take 1 tablet by mouth Daily., Disp: 90 tablet, Rfl: 3    nitroglycerin (NITROSTAT) 0.4 MG SL tablet, Place 1 tablet under the tongue Every 5 (Five) Minutes As Needed for Chest Pain.  "Take no more than 3 doses in 15 minutes., Disp: 100 tablet, Rfl: 1    pantoprazole (PROTONIX) 40 MG EC tablet, Take 1 tablet by mouth Daily., Disp: , Rfl:     predniSONE (DELTASONE) 5 MG tablet, Take 1 tablet by mouth Take As Directed. Every other week, currently off of, Disp: , Rfl:     tamsulosin (FLOMAX) 0.4 MG capsule 24 hr capsule, Take 1 capsule by mouth Daily., Disp: 90 capsule, Rfl: 3    triamcinolone (KENALOG) 0.1 % cream, Daily As Needed., Disp: , Rfl:   Past Medical History:   Diagnosis Date    Aortic valve regurgitation     Arthritis 2000    Asthma COPD    Cancer     Carotid stenosis     Cataract     right    COPD (chronic obstructive pulmonary disease)     Coronary artery disease     Depression 2011    GERD (gastroesophageal reflux disease)     Hard of hearing     Hyperlipidemia     Hypertension     Intermittent claudication 09/29/2010    PONV (postoperative nausea and vomiting)     Renal cell carcinoma     TIA (transient ischemic attack)      Past Surgical History:   Procedure Laterality Date    BACK SURGERY      CARDIAC CATHETERIZATION      X 4 - 2010 - 2005 - 2003 - 1996    CAROTID ENDARTERECTOMY Left 08/18/2021    Procedure: LEFT CAROTID ENDARTERECTOMY WITH EEG;  Surgeon: Puneet Chang DO;  Location:  [facility];  Service: Vascular;  Laterality: Left;    CAROTID ENDARTERECTOMY Right 10/18/2021    Procedure: RIGHT CAROTID ENDARTERECTOMY WITH EEG;  Surgeon: Puneet Chang DO;  Location:  [facility];  Service: Vascular;  Laterality: Right;    CORONARY ARTERY BYPASS GRAFT      X 4    EXTERNAL EAR SURGERY      NEPHRECTOMY      LEFT - 2017           Review of Systems      Objective   PHYSICAL EXAM  Vital Signs:   Temp 98.7 °F (37.1 °C)   Ht 172.7 cm (68\")   Wt 91.4 kg (201 lb 6.4 oz)   BMI 30.62 kg/m²     Physical Exam      DATA  Result Review :            Lab Results   Component Value Date    WBC 7.91 04/14/2025    HGB 12.5 (L) 04/14/2025    HCT 37.6 04/14/2025    MCV 88.5 " 04/14/2025     04/14/2025     Lab Results   Component Value Date    GLUCOSE 101 (H) 04/14/2025    BUN 20 04/14/2025    CREATININE 1.16 04/14/2025     (L) 04/14/2025    K 4.4 04/14/2025    CL 98 04/14/2025    CALCIUM 9.1 04/14/2025    PROTEINTOT 7.5 04/14/2025    ALBUMIN 4.1 04/14/2025    ALT 22 04/14/2025    AST 22 04/14/2025    ALKPHOS 173 (H) 04/14/2025    BILITOT 0.6 04/14/2025    GLOB 3.4 04/14/2025    AGRATIO 1.2 04/14/2025    BCR 17.2 04/14/2025    ANIONGAP 11.0 04/14/2025    EGFR 64.5 04/14/2025          XR CHEST 2 VW-      DATE: 4/17/2025 7:00 AM     HISTORY: RCC; C64.2-Malignant neoplasm of left kidney, except renal  pelvis       COMPARISON: 3/11/2024.     TECHNIQUE:  Frontal and lateral views of the chest submitted.     FINDINGS:    There is stable elevation of the right hemidiaphragm. Linear opacities  noted at both lung bases possibly atelectasis and/or scarring. The  patient is status post median sternotomy and CABG. Heart size is within  normal limits. No effusion or pneumothorax is seen. There is left  glenohumeral joint osteoarthritis and AC joint arthropathy.        IMPRESSION:  1. Stable elevated right hemidiaphragm and mild linear opacities at the  lung bases atelectasis versus scarring.     This report was signed and finalized on 4/17/2025 8:05 AM by Nato David.     CT ABDOMEN WITH CONTRAST-  4/14/2025 1:54 PM     HISTORY: Renal cell carcinoma left kidney.     TECHNIQUE: Spiral CT was performed of the abdomen with IV contrast. Dual  phase imaging was performed after contrast administration. Multiplanar  images were reconstructed.     DLP: 871.77 mGy.cm Automated dosage reduction technique was utilized to  reduce patient dose.     COMPARISON: 3/8/2024.     LUNG BASES: There is centrilobular and paraseptal emphysema. There is  mild peripheral pulmonary fibrosis. There is atheromatous calcification  of the thoracic aorta and coronary arteries. There has been prior heart  bypass  surgery. There is cardiomegaly. There is dependent atelectasis.     LIVER AND SPLEEN: There is a stable 8 mm cyst in the left hepatic lobe  image 29 series 6. The liver is otherwise unremarkable. Spleen size is  upper limits of normal measuring 12 cm in length. There are no dense  gallstones.     PANCREAS: No pancreatic mass or inflammatory change.     KIDNEYS AND ADRENALS: The adrenal glands are normal. Prior left  nephrectomy. There is an upper to midpole renal cyst on the right  measuring 2.8 cm with a Hounsfield unit measurement of -3. There is an  upper to midpole renal cyst on the right posteriorly measuring 3.8 cm  with a Hounsfield unit measurement of 3. There are no solid appearing  right renal lesions. The visualized portion of the right ureter is  nondilated.     BOWEL: There is a small hiatal hernia. There is under distention of the  stomach. Visualized small bowel loops are normal caliber. Visualized  colon is unremarkable.     OTHER: There is atheromatous disease of the aortoiliac vessels. The  abdominal aorta measures 2.6 cm. 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 There are degenerative changes of the visualized spine.        IMPRESSION:  1. Prior left nephrectomy. No evidence of any recurrent neoplasm in the  nephrectomy bed.  2. There are 2 right renal cysts. A subcentimeter liver cyst in the left  hepatic lobe.  3. Centrilobular and paraseptal emphysema. Mild peripheral pulmonary  fibrosis.  4. Atheromatous disease of the thoracic aorta, coronary arteries and  abdominal aorta. 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 Cardiomegaly.  5. Small hiatal hernia.  6. Spleen size upper limits of normal. Other nonacute findings, as  discussed.     This report was signed and finalized on 4/14/2025 3:54 PM by Dr. Milad Olivarez MD.    Reviewed report and the images.  There is no evidence  of retroperitoneal adenopathy, recurrent mass in flank or metachronous lesion in right kidney/DLS           ASSESSMENT AND PLAN          Problem List Items Addressed This Visit          Hematology and Neoplasia    Renal cell carcinoma - Primary    Relevant Orders    US Renal Bilateral     No evidence of cancer on studies.           FOLLOW UP     Return in about 1 year (around 4/17/2026) for Renal US before visit.      I've had the privilege to manage this patient since June 2021 for his history of clear-cell renal cell carcinoma. This will require ongoing management to include follow-up, possible medication adjustments and other therapies.      (Please note that portions of this note were completed with a voice recognition program.)  Kodi Burrell MD  04/17/25  13:24 CDT

## 2025-04-09 ENCOUNTER — OFFICE VISIT (OUTPATIENT)
Dept: CARDIOLOGY | Facility: CLINIC | Age: 80
End: 2025-04-09
Payer: MEDICARE

## 2025-04-09 VITALS
BODY MASS INDEX: 30.77 KG/M2 | DIASTOLIC BLOOD PRESSURE: 67 MMHG | HEIGHT: 68 IN | WEIGHT: 203 LBS | OXYGEN SATURATION: 100 % | HEART RATE: 65 BPM | SYSTOLIC BLOOD PRESSURE: 143 MMHG

## 2025-04-09 DIAGNOSIS — I25.118 CORONARY ARTERY DISEASE OF NATIVE ARTERY OF NATIVE HEART WITH STABLE ANGINA PECTORIS: ICD-10-CM

## 2025-04-09 DIAGNOSIS — R07.9 CHEST PAIN IN ADULT: ICD-10-CM

## 2025-04-09 DIAGNOSIS — I10 BENIGN ESSENTIAL HTN: ICD-10-CM

## 2025-04-09 DIAGNOSIS — Z95.1 S/P CABG (CORONARY ARTERY BYPASS GRAFT): ICD-10-CM

## 2025-04-09 DIAGNOSIS — I65.21 STENOSIS OF RIGHT CAROTID ARTERY: Primary | ICD-10-CM

## 2025-04-09 PROCEDURE — 3078F DIAST BP <80 MM HG: CPT | Performed by: INTERNAL MEDICINE

## 2025-04-09 PROCEDURE — 99213 OFFICE O/P EST LOW 20 MIN: CPT | Performed by: INTERNAL MEDICINE

## 2025-04-09 PROCEDURE — 3077F SYST BP >= 140 MM HG: CPT | Performed by: INTERNAL MEDICINE

## 2025-04-09 PROCEDURE — 93000 ELECTROCARDIOGRAM COMPLETE: CPT | Performed by: INTERNAL MEDICINE

## 2025-04-09 RX ORDER — SODIUM CHLORIDE 0.9 % (FLUSH) 0.9 %
10 SYRINGE (ML) INJECTION EVERY 12 HOURS SCHEDULED
OUTPATIENT
Start: 2025-04-09

## 2025-04-09 RX ORDER — SODIUM CHLORIDE 9 MG/ML
40 INJECTION, SOLUTION INTRAVENOUS AS NEEDED
OUTPATIENT
Start: 2025-04-09

## 2025-04-09 RX ORDER — SODIUM CHLORIDE 0.9 % (FLUSH) 0.9 %
10 SYRINGE (ML) INJECTION AS NEEDED
OUTPATIENT
Start: 2025-04-09

## 2025-04-09 NOTE — PROGRESS NOTES
Marika Gutierrez  [id number]  1945  79 y.o.  male    Referring Provider: Abilio Martinez MD    Reason for  Visit: Here for routine follow up   Echo as below with normal LVEF and moderate aortic insufficiency     Subjective    Overall feels the same   No new events or complaints since last visit   Overall the patient feels no major change from baseline symptoms   Similar symptoms as during last visit       Mild chronic exertional shortness of breath on exertion relieved with rest  No significant cough or wheezing  He gets more shortness of breath especially going uphill  Easy fatiguability and increasing tired  Feels energy levels running low      No palpitations  No associated chest pain  No significant pedal edema    No fever or chills  No significant expectoration    No hemoptysis  No presyncope or syncope    Tolerating current medications well with no untoward side effects   Compliant with prescribed medication regimen. Tries to adhere to cardiac diet.     Easy fatiguability and increasing tired  Feels energy levels running low      BP well controlled at home.   120-130s/60 s mm Hg   BP in clinic as below      Has excessive bruising, on alternate day Plavix     History of present illness:  Marika Gutierrez is a 79 y.o. yo male with copd coronary artery disease coronary artery bypass grafting 1991 who presents today for   Chief Complaint   Patient presents with    Coronary Artery Disease     3 month follow up.   .    History  Past Medical History:   Diagnosis Date    Aortic valve regurgitation     Arthritis 2000    Asthma COPD    Cancer     Carotid stenosis     Cataract     right    COPD (chronic obstructive pulmonary disease)     Coronary artery disease     Depression 2011    GERD (gastroesophageal reflux disease)     Hard of hearing     Hyperlipidemia     Hypertension     Intermittent claudication 09/29/2010    PONV (postoperative nausea and vomiting)     Renal cell carcinoma     TIA (transient  ischemic attack)    ,   Past Surgical History:   Procedure Laterality Date    BACK SURGERY      CARDIAC CATHETERIZATION      X 4 -  - 2005 -  -     CAROTID ENDARTERECTOMY Left 2021    Procedure: LEFT CAROTID ENDARTERECTOMY WITH EEG;  Surgeon: Puneet Chang DO;  Location:  [facility];  Service: Vascular;  Laterality: Left;    CAROTID ENDARTERECTOMY Right 10/18/2021    Procedure: RIGHT CAROTID ENDARTERECTOMY WITH EEG;  Surgeon: Puneet Chang DO;  Location:  [facility];  Service: Vascular;  Laterality: Right;    CORONARY ARTERY BYPASS GRAFT      X 4    EXTERNAL EAR SURGERY      NEPHRECTOMY      LEFT - 2017   ,   Family History   Problem Relation Age of Onset    Asthma Father     Heart attack Father     Heart disease Father    ,   Social History     Tobacco Use    Smoking status: Former     Current packs/day: 0.00     Average packs/day: 1.5 packs/day for 27.0 years (40.5 ttl pk-yrs)     Types: Cigarettes     Start date: 1977     Quit date:      Years since quittin.2     Passive exposure: Past    Smokeless tobacco: Former     Types: Chew    Tobacco comments:     Stop 17 years ago   Vaping Use    Vaping status: Never Used   Substance Use Topics    Alcohol use: Not Currently     Comment: None    Drug use: Not Currently   ,     Medications  Current Outpatient Medications   Medication Sig Dispense Refill    alfuzosin (UROXATRAL) 10 MG 24 hr tablet Take 1 tablet by mouth Daily. 90 tablet 3    amLODIPine (NORVASC) 10 MG tablet Take 1 tablet by mouth Daily. 90 tablet 3    aspirin 81 MG chewable tablet Chew 1 tablet Daily.      atorvastatin (LIPITOR) 80 MG tablet Take 1 tablet by mouth Daily.      citalopram (CeleXA) 20 MG tablet Take 1 tablet by mouth Daily.      clindamycin 1 % gel Daily.      clopidogrel (PLAVIX) 75 MG tablet Take 1 tablet by mouth Daily.      diazePAM (VALIUM) 5 MG tablet Take 1 tablet by mouth Every Night.      fluticasone (FLONASE) 50 MCG/ACT nasal  "spray Administer 2 sprays into the nostril(s) as directed by provider Daily. 16 g 2    hydrALAZINE (APRESOLINE) 50 MG tablet Take 1 tablet by mouth 2 (Two) Times a Day. 180 tablet 3    isosorbide mononitrate (IMDUR) 60 MG 24 hr tablet Take 1 tablet by mouth Daily. 90 tablet 3    nitroglycerin (NITROSTAT) 0.4 MG SL tablet Place 1 tablet under the tongue Every 5 (Five) Minutes As Needed for Chest Pain. Take no more than 3 doses in 15 minutes. 100 tablet 1    pantoprazole (PROTONIX) 40 MG EC tablet Take 1 tablet by mouth Daily.      predniSONE (DELTASONE) 5 MG tablet Take 1 tablet by mouth Take As Directed. Every other week, currently off of      tamsulosin (FLOMAX) 0.4 MG capsule 24 hr capsule Take 1 capsule by mouth Daily. 90 capsule 3    triamcinolone (KENALOG) 0.1 % cream Daily As Needed.       No current facility-administered medications for this visit.       Allergies:  Cyclobenzaprine, Hydrocodone-acetaminophen, and Pentazocine    Review of Systems  Review of Systems   Constitutional: Negative.   HENT: Negative.     Eyes: Negative.    Cardiovascular:  Positive for chest pain and dyspnea on exertion. Negative for claudication, cyanosis, irregular heartbeat, leg swelling, near-syncope, orthopnea, palpitations, paroxysmal nocturnal dyspnea and syncope.   Respiratory: Negative.     Endocrine: Negative.    Hematologic/Lymphatic: Negative.    Skin: Negative.    Gastrointestinal:  Negative for anorexia.   Genitourinary: Negative.    Neurological: Negative.    Psychiatric/Behavioral: Negative.         Objective     Physical Exam:  /67   Pulse 65   Ht 172.7 cm (68\")   Wt 92.1 kg (203 lb)   SpO2 100%   BMI 30.87 kg/m²     Physical Exam  Constitutional:       Appearance: He is well-developed.   HENT:      Head: Normocephalic.   Neck:      Vascular: Normal carotid pulses. No carotid bruit or JVD.      Trachea: No tracheal tenderness or tracheal deviation.   Cardiovascular:      Rate and Rhythm: Regular rhythm.    " "  Pulses: Normal pulses.      Heart sounds: Murmur heard.      Systolic murmur is present with a grade of 2/6.      Diastolic murmur is present with a grade of 1/4.   Pulmonary:      Effort: Pulmonary effort is normal.      Breath sounds: No stridor.   Abdominal:      Palpations: Abdomen is soft.   Musculoskeletal:      Cervical back: No edema.   Skin:     General: Skin is warm.   Neurological:      Mental Status: He is alert.      Cranial Nerves: No cranial nerve deficit.      Sensory: No sensory deficit.   Psychiatric:         Speech: Speech normal.         Behavior: Behavior normal.         Results Review:      Regadenoson Stress Test with Myocardial Perfusion SPECT (Multi Study)    Patient Name: Marika Gutierrez \"Bill\"   Patient MRN: [id number]   Patient : 1945 (78 y.o.)   Legal Sex: Male    Accession Number: [id number]   Date of Study: 3/20/24   Ordering Provider: Pradip Hoskins MD   Clinical Indications: Known Coronary Artery Disease       [place] Physicians  Performing Staff   ECG [place], SPECT [place]ney: Pradip Hoskins MD    Tech: Rick Arredondo, [facility]    Support Staff: Umm Slater, RN     Julian, Kathryn HAILE RN          [facility]     Show images for Stress Test With Myocardial Perfusion (1 Day)   Interpretation Summary         Moderate lateral infarction without significant ischemia.  This corresponds to the left circumflex coronary artery distribution.  Total  perfusion defect of 12%.  Corresponding area of hypokinesis    Left ventricular ejection fraction is normal (Calculated EF = 59%).         Patient Information    Patient Name  Marika Gutierrez MRN  [id number] Legal Sex  Male  (Age)  1945 (78 y.o.)     Regadenoson Stress Test with Myocardial Perfusion SPECT (Multi Study)    Accession Number: [id number]   Date of Study: 21   Ordering Provider: Pradip Hoskins MD   Clinical Indications: Known Coronary Artery Disease        Interpreting Physicians  Performing " Staff   Pradip Hoskins MD Tech: [facility]   Support Staff: Kaci Gamez RN        Interpretation Summary    Left ventricular ejection fraction is normal. (Calculated EF = 63%).  Myocardial perfusion imaging indicates a medium-sized infarct located in the lateral wall with no significant ischemia noted.       Results for orders placed during the hospital encounter of 12/18/24    Adult Transthoracic Echo Complete w/ Color, Spectral and Contrast if necessary per protocol    Interpretation Summary    Left ventricular ejection fraction appears to be 56 - 60%.    Left ventricular diastolic function was normal.    Moderate aortic valve regurgitation is present.    Estimated right ventricular systolic pressure from tricuspid regurgitation is normal (<35 mmHg).    Aortic regurgitation similar to prior echo        ____________________________________________________________________________________________________________________________________________  Health maintenance and recommendations    Low salt/ HTN/ Heart healthy carbohydrate restricted cardiac diet   The patient is advised to reduce or avoid caffeine or other cardiac stimulants.   Minimize or avoid  NSAID-type medications      Monitor for any signs of bleeding including red or dark stools. Fall precautions.   Advised staying uptodate with immunizations per established standard guidelines.    Offered to give patient  a copy of my notes     Questions were encouraged, asked and answered to the patient's  understanding and satisfaction. Questions if any regarding current medications and side effects, need for refills and importance of compliance to medications stressed.    Reviewed available prior notes, consults, prior visits, laboratory findings, radiology and cardiology relevant reports. Updated chart as applicable. I have reviewed the patient's medical history in detail and updated the computerized patient record as relevant.      Updated patient  regarding any new or relevant abnormalities on review of records or any new findings on physical exam. Mentioned to patient about purpose of visit and desirable health short and long term goals and objectives.    Primary to monitor CBC CMP Lipid panel and TSH as applicable    ___________________________________________________________________________________________________________________________________________     ECG 12 Lead    Date/Time: 4/9/2025 9:07 AM  Performed by: Pradip Hoskins MD    Authorized by: Pradip Hoskins MD  Comparison: compared with previous ECG from 2/14/2024  Comparison to previous ECG: Ventricular rate changed from 69  to 64  beats per minute      Rhythm: sinus rhythm  Rate: normal  Conduction: conduction normal  ST Segments: ST segments normal  T Waves: T waves normal  QRS axis: normal  Other: no other findings          Assessment & Plan   Diagnoses and all orders for this visit:    1. Stenosis of right carotid artery (Primary)    2. Coronary artery disease of native artery of native heart with stable angina pectoris    3. Benign essential HTN    4. S/P CABG (coronary artery bypass graft) 1996 [place]     5. Chest pain in adult  -     Stress Test With PET Myocardial Perfusion; Future  -     NM PET Cardiac Stress Radiology Interpretation; Future  -     No Caffeine, Decaffeinated Coffee, or Nicotine 12 hrs Prior to Cardiac PET CT Appointment  -     Nothing to Eat or Drink 6 Hours Prior to Cardiac PET CT Appointment  -     No Theophylline or Products Containing Theophylline for 12 Hours prior Cardiac PET CT Appointment  -     No Oral Diabetic Medications After Midnight Prior to Cardiac PET CT Appointment, Hold Half of Insulin Dose Morning of Cardiac PET CT Appointment  -     Insert Peripheral IV; Standing  -     Saline Lock & Maintain IV Access; Standing  -     sodium chloride 0.9 % flush 10 mL  -     sodium chloride 0.9 % flush 10 mL  -     sodium chloride 0.9 % infusion 40 mL    Other  orders  -     ECG 12 Lead          Plan      Patient expressed understanding  Encouraged and answered all questions   Discussed with the patient and all questioned fully answered. He will call me if any problems arise.   Discussed results of prior testing with patient : echo and carotid Ultrasound with B/L 50-69 %  myocardial perfusion scan    Will repeat myocardial perfusion scan      Orders Placed This Encounter   Procedures    NM PET Cardiac Stress Radiology Interpretation     Standing Status:   Future     Expected Date:   4/12/2025     Expiration Date:   7/9/2026     Reason for Exam::   cp     Release to patient:   Routine Release [[id number]]    No Caffeine, Decaffeinated Coffee, or Nicotine 12 hrs Prior to Cardiac PET CT Appointment    Nothing to Eat or Drink 6 Hours Prior to Cardiac PET CT Appointment    No Theophylline or Products Containing Theophylline for 12 Hours prior Cardiac PET CT Appointment    No Oral Diabetic Medications After Midnight Prior to Cardiac PET CT Appointment, Hold Half of Insulin Dose Morning of Cardiac PET CT Appointment    Stress Test With PET Myocardial Perfusion     Standing Status:   Future     Expected Date:   4/14/2025     Expiration Date:   7/9/2026     Reason for exam?:   Chest Pain     Reason for exam?:   Known Coronary Artery Disease     Release to patient:   Routine Release [[id number]]    ECG 12 Lead     This order was created via procedure documentation     Release to patient:   Routine Release [[id number]]         S/L NTG PRN for chest pain, call me or go to ER if has to use S/L nitroglycerin  Monitor left ventricular ejection fraction and aortic insufficiency by serial echo      Keep f/u with vascular surgery that is already scheduled   Stop Plavix and start Aspirin 81 mg daily     Monitor for any signs of bleeding including red or dark stools as well as easy bruisabilty. Fall precautions.   Check BP and heart rates twice daily initially till blood pressures and heart  rates under good control and then at least 3x / week,   If blood pressures continue to be well-controlled then can check week a month  at home and bring a recording for review next visit  If BP >130/85 or < 100/60 persistently over 3 reading 30 mins apart or if heart rates persistently above 100 bpm or less than 55 bpm call sooner for evaluation and advise     Keep LDL below 55 mg/dl. Monitor liver and renal functions.   Monitor CBC, CMP, TSH (as indicated) and Lipid Panel by primary   Needs repeat LDL as not target     Bring copies or have primary fax to our office labs and other tests prior to next visit   He will get this from Dr Martinez             Return in about 6 weeks (around 5/21/2025).

## 2025-04-14 ENCOUNTER — LAB (OUTPATIENT)
Dept: LAB | Facility: HOSPITAL | Age: 80
End: 2025-04-14
Payer: MEDICARE

## 2025-04-14 ENCOUNTER — HOSPITAL ENCOUNTER (OUTPATIENT)
Dept: CT IMAGING | Facility: HOSPITAL | Age: 80
Discharge: HOME | End: 2025-04-14
Payer: MEDICARE

## 2025-04-14 DIAGNOSIS — C64.2 RENAL CELL CARCINOMA OF LEFT KIDNEY: ICD-10-CM

## 2025-04-14 LAB
ALBUMIN SERPL-MCNC: 4.1 G/DL (ref 3.5–5.2)
ALBUMIN/GLOB SERPL: 1.2 G/DL
ALP SERPL-CCNC: 173 U/L (ref 39–117)
ALT SERPL W P-5'-P-CCNC: 22 U/L (ref 1–41)
ANION GAP SERPL CALCULATED.3IONS-SCNC: 11 MMOL/L (ref 5–15)
AST SERPL-CCNC: 22 U/L (ref 1–40)
BILIRUB SERPL-MCNC: 0.6 MG/DL (ref 0–1.2)
BUN SERPL-MCNC: 20 MG/DL (ref 8–23)
BUN/CREAT SERPL: 17.2 (ref 7–25)
CALCIUM SPEC-SCNC: 9.1 MG/DL (ref 8.6–10.5)
CHLORIDE SERPL-SCNC: 98 MMOL/L (ref 98–107)
CO2 SERPL-SCNC: 26 MMOL/L (ref 22–29)
CREAT SERPL-MCNC: 1.16 MG/DL (ref 0.76–1.27)
DEPRECATED RDW RBC AUTO: 43.3 FL (ref 37–54)
EGFRCR SERPLBLD CKD-EPI 2021: 64.5 ML/MIN/1.73
ERYTHROCYTE [DISTWIDTH] IN BLOOD BY AUTOMATED COUNT: 13.4 % (ref 12.3–15.4)
GLOBULIN UR ELPH-MCNC: 3.4 GM/DL
GLUCOSE SERPL-MCNC: 101 MG/DL (ref 65–99)
HCT VFR BLD AUTO: 37.6 % (ref 37.5–51)
HGB BLD-MCNC: 12.5 G/DL (ref 13–17.7)
MCH RBC QN AUTO: 29.4 PG (ref 26.6–33)
MCHC RBC AUTO-ENTMCNC: 33.2 G/DL (ref 31.5–35.7)
MCV RBC AUTO: 88.5 FL (ref 79–97)
PLATELET # BLD AUTO: 187 10*3/MM3 (ref 140–450)
PMV BLD AUTO: 9.5 FL (ref 6–12)
POTASSIUM SERPL-SCNC: 4.4 MMOL/L (ref 3.5–5.2)
PROT SERPL-MCNC: 7.5 G/DL (ref 6–8.5)
RBC # BLD AUTO: 4.25 10*6/MM3 (ref 4.14–5.8)
SODIUM SERPL-SCNC: 135 MMOL/L (ref 136–145)
WBC NRBC COR # BLD AUTO: 7.91 10*3/MM3 (ref 3.4–10.8)

## 2025-04-14 PROCEDURE — 25510000001 IOPAMIDOL PER 1 ML: Performed by: UROLOGY

## 2025-04-14 PROCEDURE — 74160 CT ABDOMEN W/CONTRAST: CPT

## 2025-04-14 PROCEDURE — 80053 COMPREHEN METABOLIC PANEL: CPT

## 2025-04-14 PROCEDURE — 36415 COLL VENOUS BLD VENIPUNCTURE: CPT

## 2025-04-14 PROCEDURE — 85027 COMPLETE CBC AUTOMATED: CPT

## 2025-04-14 RX ORDER — IOPAMIDOL 755 MG/ML
100 INJECTION, SOLUTION INTRAVASCULAR
Status: COMPLETED | OUTPATIENT
Start: 2025-04-14 | End: 2025-04-14

## 2025-04-14 RX ADMIN — IOPAMIDOL 100 ML: 755 INJECTION, SOLUTION INTRAVENOUS at 15:13

## 2025-04-16 ENCOUNTER — TELEPHONE (OUTPATIENT)
Dept: UROLOGY | Facility: CLINIC | Age: 80
End: 2025-04-16
Payer: MEDICARE

## 2025-04-16 NOTE — TELEPHONE ENCOUNTER
Called Patient to remind them to get CXR prior to appointment.  Left message with Patient.  If patient calls back it is ok for the HUB to tell the pt the message.

## 2025-04-17 ENCOUNTER — HOSPITAL ENCOUNTER (OUTPATIENT)
Dept: GENERAL RADIOLOGY | Facility: HOSPITAL | Age: 80
Discharge: HOME | End: 2025-04-17
Admitting: UROLOGY
Payer: MEDICARE

## 2025-04-17 ENCOUNTER — OFFICE VISIT (OUTPATIENT)
Dept: UROLOGY | Facility: CLINIC | Age: 80
End: 2025-04-17
Payer: MEDICARE

## 2025-04-17 VITALS — HEIGHT: 68 IN | TEMPERATURE: 98.7 F | WEIGHT: 201.4 LBS | BODY MASS INDEX: 30.52 KG/M2

## 2025-04-17 DIAGNOSIS — C64.2 RENAL CELL CARCINOMA OF LEFT KIDNEY: ICD-10-CM

## 2025-04-17 DIAGNOSIS — C64.2 RENAL CELL CARCINOMA OF LEFT KIDNEY: Primary | ICD-10-CM

## 2025-04-17 PROCEDURE — 71046 X-RAY EXAM CHEST 2 VIEWS: CPT

## 2025-06-16 ENCOUNTER — HOSPITAL ENCOUNTER (OUTPATIENT)
Facility: HOSPITAL | Age: 80
Discharge: HOME | End: 2025-06-16
Payer: MEDICARE

## 2025-06-16 VITALS
BODY MASS INDEX: 31.07 KG/M2 | DIASTOLIC BLOOD PRESSURE: 43 MMHG | HEIGHT: 68 IN | SYSTOLIC BLOOD PRESSURE: 138 MMHG | WEIGHT: 205 LBS | HEART RATE: 60 BPM

## 2025-06-16 DIAGNOSIS — R07.9 CHEST PAIN IN ADULT: ICD-10-CM

## 2025-06-16 PROCEDURE — A9555 RB82 RUBIDIUM: HCPCS | Performed by: INTERNAL MEDICINE

## 2025-06-16 PROCEDURE — 93017 CV STRESS TEST TRACING ONLY: CPT

## 2025-06-16 PROCEDURE — 34310000005 RUBIDIUM CHLORIDE: Performed by: INTERNAL MEDICINE

## 2025-06-16 PROCEDURE — 78431 MYOCRD IMG PET RST&STRS CT: CPT

## 2025-06-16 RX ORDER — REGADENOSON 0.08 MG/ML
0.4 INJECTION, SOLUTION INTRAVENOUS ONCE
Status: DISCONTINUED | OUTPATIENT
Start: 2025-06-16 | End: 2025-06-17 | Stop reason: HOSPADM

## 2025-06-19 NOTE — PROGRESS NOTES
Chief Complaint  Coronary Artery Disease and Stenosis of right carotid artery    Subjective          Marika Gutierrez presents to [facility] CARDIOLOGY for routine follow up of outpatient testing.  Cardiac PET scan on 6/16/2025 revealed signs of emphysema and moderate size lateral infarction in the left circumflex coronary artery distribution with small area of circumferential anna-infarct ischemia and total coronary flow reserve of 1.5, which may be abnormal secondary to previous bypass surgery. He has coronary artery disease s/p CABG in 1996, aortic valve regurgitation, aortic valve insufficiency, pulmonary hypertension, COPD, renal cell carcinoma, bilateral carotid artery stenosis, hypertension, hyperlipidemia and obesity. He continues to complain of chronic dyspnea with exertion that has not worsened. The patient denies chest pain, palpitations, dizziness, syncope, orthopnea, PND, swelling or decreased stamina. He denies any signs of bleeding.     Coronary Artery Disease  Presents for follow-up visit. Symptoms include shortness of breath. Pertinent negatives include no chest pain, chest pressure, chest tightness, dizziness, leg swelling, muscle weakness, palpitations or weight gain. Risk factors include hyperlipidemia. The symptoms have been stable. Compliance with diet is variable. Compliance with exercise is variable. Compliance with medications is good.   Hypertension  Chronicity:  Chronic  Onset:  More than 1 year ago  Condition status:  Uncontrolled  Associated symptoms: shortness of breath and dyspnea with exertion    Associated symptoms: no anxiety, no blurred vision, no chest pain, no headaches, no malaise/fatigue, no neck pain, no orthopnea, no palpitations, no peripheral edema, no PND, no sweats and no dizziness    CAD risks:  Male gender and dyslipidemia  Current therapy:  Calcium channel blockers and direct vasodilators  End-organ damage: CAD/MI    Hyperlipidemia  This is a chronic  "problem. The current episode started more than 1 year ago. Associated symptoms include shortness of breath. Pertinent negatives include no chest pain. Current antihyperlipidemic treatment includes statins. Risk factors for coronary artery disease include hypertension, dyslipidemia and male sex.     I have reviewed and confirmed the accuracy of the ROS  DONA Johnson    Objective   Vital Signs:   /82   Pulse 60   Ht 172.7 cm (67.99\")   Wt 92.1 kg (203 lb)   SpO2 97%   BMI 30.88 kg/m²     Vitals and nursing note reviewed.   Constitutional:       General: Awake.      Appearance: Normal and healthy appearance. Well-developed, normal weight and not in distress.   Eyes:      General: Lids are normal.      Conjunctiva/sclera: Conjunctivae normal.      Pupils: Pupils are equal, round, and reactive to light.   HENT:      Head: Normocephalic and atraumatic.      Nose: Nose normal.   Neck:      Vascular: No JVR. JVD normal.   Pulmonary:      Effort: Pulmonary effort is normal.      Breath sounds: Decreased air movement present. Examination of the right-upper field reveals decreased breath sounds. Examination of the left-upper field reveals decreased breath sounds. Examination of the right-middle field reveals decreased breath sounds. Examination of the left-middle field reveals decreased breath sounds. Examination of the right-lower field reveals decreased breath sounds. Examination of the left-lower field reveals decreased breath sounds. Decreased breath sounds present. No wheezing. No rhonchi. No rales.   Chest:      Chest wall: Not tender to palpatation.   Cardiovascular:      PMI at left midclavicular line. Normal rate. Irregular rhythm. Normal S1. Normal S2.       Murmurs: There is a grade 2/6 high frequency blowing holosystolic murmur at the apex. There is a grade 2/4 high frequency blowing decrescendo, early diastolic murmur at the URSB, radiating to the apex.      No gallop.  No click. No rub. "   Pulses:     Intact distal pulses.   Edema:     Peripheral edema absent.   Abdominal:      General: Bowel sounds are normal.      Palpations: Abdomen is soft.      Tenderness: There is no abdominal tenderness.   Musculoskeletal: Normal range of motion.         General: No tenderness.      Cervical back: Normal range of motion. Skin:     General: Skin is warm and dry.   Neurological:      General: No focal deficit present.      Mental Status: Alert, oriented to person, place, and time and oriented to person, place and time.   Psychiatric:         Attention and Perception: Attention and perception normal.         Mood and Affect: Mood and affect normal.         Speech: Speech normal.         Behavior: Behavior normal. Behavior is cooperative.         Thought Content: Thought content normal.         Cognition and Memory: Cognition and memory normal.         Judgment: Judgment normal.        Result Review :   The following data was reviewed by: DONA Johnson on 06/27/2025:  Common labs          4/14/2025    14:32   Common Labs   Glucose 101    BUN 20    Creatinine 1.16    Sodium 135    Potassium 4.4    Chloride 98    Calcium 9.1    Albumin 4.1    Total Bilirubin 0.6    Alkaline Phosphatase 173    AST (SGOT) 22    ALT (SGPT) 22    WBC 7.91    Hemoglobin 12.5    Hematocrit 37.6    Platelets 187      Data reviewed: Cardiology studies cardiac PET scan 6/16/2025, Lexiscan 7/22/21, 2D echo 12/18/2024, 11/30/2021 and 11/28/2022 and carotid ultrasound 6/15/21 and 7/25/2022    ECG 12 Lead    Date/Time: 6/27/2025 10:39 AM  Performed by: Ronit Galloway APRN    Authorized by: Ronit Galloway APRN  Comparison: compared with previous ECG from 4/9/2025  Rhythm: sinus rhythm  Rate: normal  BPM: 60  QRS axis: left    Clinical impression: abnormal EKG            Assessment and Plan    Diagnoses and all orders for this visit:    1. Coronary artery disease involving native coronary artery of native heart with stable  angina pectoris (Primary)-  Stable.  Continue Imdur.    2. S/P CABG (coronary artery bypass graft)- 1996 in [place], [place]. Pt continues on plavix. Denies bleeding.      3. Nonrheumatic aortic valve insufficiency- moderate on echo 12/18/2024. Monitor with routine echo around 12/18/25.     4. Benign essential HTN- blood pressure is mildly elevated in office today. Pt reports well controlled at home with readings consistently lower than 130/80. Continue amlodipine and hydralazine.  Monitor and record daily blood pressure. Report readings consistently higher than 130/80 or consistently lower than 100/60.     5. Mixed hyperlipidemia- management per PCP.  LDL outside of goal range at 73 on lipid panel 10/8/2024.  Continue Lipitor.  Start Zetia 10 mg daily.  Recheck lipid panel in 3 months per PCP.     6. Class 1 obesity due to excess calories with serious comorbidity and body mass index (BMI) of 30.0 to 30.9 in - Patient's Body mass index is 30.88 kg/m². indicating that he is obese (BMI >30). Obesity-related health conditions include the following: hypertension and dyslipidemias. Obesity is unchanged. BMI is is above average; no BMI management plan is appropriate. We discussed low calorie, low carb based diet program, portion control and increasing exercise.      Follow Up   Return in about 6 months (around 12/27/2025) for Next scheduled follow up.  Patient was given instructions and counseling regarding his condition or for health maintenance advice. Please see specific information pulled into the AVS if appropriate.

## 2025-06-21 LAB
BH CV NUCLEAR PRIOR STUDY: 3
BH CV REST NUCLEAR ISOTOPE DOSE: 23.9 MCI
BH CV STRESS NUCLEAR ISOTOPE DOSE: 23.9 MCI
BH CV STRESS RECOVERY BP: NORMAL MMHG
BH CV STRESS RECOVERY HR: 76 BPM
MAXIMAL PREDICTED HEART RATE: 142 BPM
PERCENT MAX PREDICTED HR: 58.45 %
SPECT HRT GATED+EF W RNC IV: 55 %
STRESS BASELINE BP: NORMAL MMHG
STRESS BASELINE HR: 60 BPM
STRESS PERCENT HR: 69 %
STRESS POST EXERCISE DUR MIN: 0 MIN
STRESS POST EXERCISE DUR SEC: 10 SEC
STRESS POST PEAK BP: NORMAL MMHG
STRESS POST PEAK HR: 83 BPM
STRESS TARGET HR: 121 BPM

## 2025-06-23 RX ORDER — NITROGLYCERIN 0.4 MG/1
TABLET SUBLINGUAL
Qty: 100 TABLET | Refills: 1 | Status: SHIPPED | OUTPATIENT
Start: 2025-06-23

## 2025-06-27 ENCOUNTER — OFFICE VISIT (OUTPATIENT)
Dept: CARDIOLOGY | Facility: CLINIC | Age: 80
End: 2025-06-27
Payer: MEDICARE

## 2025-06-27 VITALS
SYSTOLIC BLOOD PRESSURE: 128 MMHG | BODY MASS INDEX: 30.77 KG/M2 | HEIGHT: 68 IN | HEART RATE: 60 BPM | OXYGEN SATURATION: 97 % | WEIGHT: 203 LBS | DIASTOLIC BLOOD PRESSURE: 82 MMHG

## 2025-06-27 DIAGNOSIS — I35.1 NONRHEUMATIC AORTIC VALVE INSUFFICIENCY: ICD-10-CM

## 2025-06-27 DIAGNOSIS — E78.2 MIXED HYPERLIPIDEMIA: ICD-10-CM

## 2025-06-27 DIAGNOSIS — E66.09 CLASS 1 OBESITY DUE TO EXCESS CALORIES WITH SERIOUS COMORBIDITY AND BODY MASS INDEX (BMI) OF 30.0 TO 30.9 IN ADULT: ICD-10-CM

## 2025-06-27 DIAGNOSIS — I25.118 CORONARY ARTERY DISEASE OF NATIVE ARTERY OF NATIVE HEART WITH STABLE ANGINA PECTORIS: Primary | ICD-10-CM

## 2025-06-27 DIAGNOSIS — I10 BENIGN ESSENTIAL HTN: ICD-10-CM

## 2025-06-27 DIAGNOSIS — E66.811 CLASS 1 OBESITY DUE TO EXCESS CALORIES WITH SERIOUS COMORBIDITY AND BODY MASS INDEX (BMI) OF 30.0 TO 30.9 IN ADULT: ICD-10-CM

## 2025-06-27 DIAGNOSIS — Z95.1 S/P CABG (CORONARY ARTERY BYPASS GRAFT): ICD-10-CM

## 2025-06-27 PROCEDURE — 3079F DIAST BP 80-89 MM HG: CPT | Performed by: NURSE PRACTITIONER

## 2025-06-27 PROCEDURE — 3074F SYST BP LT 130 MM HG: CPT | Performed by: NURSE PRACTITIONER

## 2025-06-27 PROCEDURE — 93000 ELECTROCARDIOGRAM COMPLETE: CPT | Performed by: NURSE PRACTITIONER

## 2025-06-27 PROCEDURE — 99214 OFFICE O/P EST MOD 30 MIN: CPT | Performed by: NURSE PRACTITIONER

## 2025-06-27 PROCEDURE — 1160F RVW MEDS BY RX/DR IN RCRD: CPT | Performed by: NURSE PRACTITIONER

## 2025-06-27 PROCEDURE — 1159F MED LIST DOCD IN RCRD: CPT | Performed by: NURSE PRACTITIONER

## 2025-06-27 RX ORDER — EZETIMIBE 10 MG/1
10 TABLET ORAL DAILY
Qty: 90 TABLET | Refills: 3 | Status: SHIPPED | OUTPATIENT
Start: 2025-06-27

## 2025-07-25 ENCOUNTER — TELEPHONE (OUTPATIENT)
Dept: VASCULAR SURGERY | Facility: CLINIC | Age: 80
End: 2025-07-25
Payer: MEDICARE

## 2025-07-28 ENCOUNTER — HOSPITAL ENCOUNTER (OUTPATIENT)
Dept: ULTRASOUND IMAGING | Facility: HOSPITAL | Age: 80
Discharge: HOME | End: 2025-07-28
Admitting: NURSE PRACTITIONER
Payer: MEDICARE

## 2025-07-28 ENCOUNTER — OFFICE VISIT (OUTPATIENT)
Dept: VASCULAR SURGERY | Facility: CLINIC | Age: 80
End: 2025-07-28
Payer: MEDICARE

## 2025-07-28 VITALS
OXYGEN SATURATION: 96 % | DIASTOLIC BLOOD PRESSURE: 64 MMHG | HEART RATE: 72 BPM | BODY MASS INDEX: 30.77 KG/M2 | HEIGHT: 68 IN | WEIGHT: 203 LBS | SYSTOLIC BLOOD PRESSURE: 122 MMHG

## 2025-07-28 DIAGNOSIS — I71.02 DISSECTION OF ABDOMINAL AORTA: ICD-10-CM

## 2025-07-28 DIAGNOSIS — I10 BENIGN ESSENTIAL HTN: ICD-10-CM

## 2025-07-28 DIAGNOSIS — I65.23 BILATERAL CAROTID ARTERY STENOSIS: ICD-10-CM

## 2025-07-28 DIAGNOSIS — E78.2 MIXED HYPERLIPIDEMIA: ICD-10-CM

## 2025-07-28 DIAGNOSIS — I65.23 BILATERAL CAROTID ARTERY STENOSIS: Primary | ICD-10-CM

## 2025-07-28 PROBLEM — Z01.818 PREOP TESTING: Status: RESOLVED | Noted: 2021-08-05 | Resolved: 2025-07-28

## 2025-07-28 PROCEDURE — 1160F RVW MEDS BY RX/DR IN RCRD: CPT | Performed by: NURSE PRACTITIONER

## 2025-07-28 PROCEDURE — 3074F SYST BP LT 130 MM HG: CPT | Performed by: NURSE PRACTITIONER

## 2025-07-28 PROCEDURE — 93880 EXTRACRANIAL BILAT STUDY: CPT | Performed by: SURGERY

## 2025-07-28 PROCEDURE — 93880 EXTRACRANIAL BILAT STUDY: CPT

## 2025-07-28 PROCEDURE — 3078F DIAST BP <80 MM HG: CPT | Performed by: NURSE PRACTITIONER

## 2025-07-28 PROCEDURE — 1159F MED LIST DOCD IN RCRD: CPT | Performed by: NURSE PRACTITIONER

## 2025-07-28 PROCEDURE — 99214 OFFICE O/P EST MOD 30 MIN: CPT | Performed by: NURSE PRACTITIONER

## 2025-07-28 NOTE — PROGRESS NOTES
"7/28/2025       Abilio Martinez MD  [address] [facility] [address]    Marika Gutierrez  1945    Chief Complaint   Patient presents with    Bilateral carotid artery stenosis     No new concerns. Completed testing today.        Dear Abilio Martinez MD    HPI  I had the pleasure of seeing your patient Marika Gutierrez in the office today.    As you recall, Marika Gutierrez is a 79 y.o.  male who we are following for carotid occlusive disease as well as chronic aortic dissection.  He did undergo a left carotid endarterectomy on 8/18/2021 and a right carotid on 10/18/2021.  Aortic dissection was found on imaging with urology.  He does have a history of renal cell carcinoma and previous left nephrectomy.  Currently he is doing well and denies any strokelike symptoms.  He denies any abdominal pain.  He is maintained on Plavix and Lipitor.  He did have noninvasive testing performed today, which I did review in office.        Review of Systems   Constitutional: Negative.    HENT: Negative.     Eyes: Negative.    Respiratory: Negative.     Cardiovascular: Negative.    Gastrointestinal: Negative.    Endocrine: Negative.    Genitourinary: Negative.    Musculoskeletal: Negative.    Skin: Negative.    Allergic/Immunologic: Negative.    Neurological: Negative.    Hematological: Negative.    Psychiatric/Behavioral: Negative.     All other systems reviewed and are negative.         /64   Pulse 72   Ht 172.7 cm (67.99\")   Wt 92.1 kg (203 lb)   SpO2 96%   BMI 30.88 kg/m²   Physical Exam  Vitals and nursing note reviewed.   Constitutional:       General: He is not in acute distress.     Appearance: Normal appearance. He is well-developed. He is obese. He is not diaphoretic.   HENT:      Head: Normocephalic and atraumatic.   Neck:      Vascular: No carotid bruit or JVD.   Cardiovascular:      Rate and Rhythm: Normal rate and regular rhythm.      Pulses: Normal pulses.           Femoral pulses " are 2+ on the right side and 2+ on the left side.       Popliteal pulses are 2+ on the right side and 2+ on the left side.        Dorsalis pedis pulses are 2+ on the right side and 2+ on the left side.        Posterior tibial pulses are 2+ on the right side and 2+ on the left side.      Heart sounds: S1 normal and S2 normal. Murmur heard.      No friction rub. No gallop.   Pulmonary:      Effort: Pulmonary effort is normal.      Breath sounds: Normal breath sounds.   Abdominal:      General: Bowel sounds are normal. There is no abdominal bruit.      Palpations: Abdomen is soft.      Tenderness: There is no abdominal tenderness.   Musculoskeletal:         General: Normal range of motion.   Skin:     General: Skin is warm and dry.   Neurological:      General: No focal deficit present.      Mental Status: He is alert and oriented to person, place, and time.      Cranial Nerves: No cranial nerve deficit.   Psychiatric:         Mood and Affect: Mood normal.         Behavior: Behavior normal.         Thought Content: Thought content normal.         Judgment: Judgment normal.           Diagnostic Data:  US Carotid Bilateral  Result Date: 7/28/2025  Narrative: History: Carotid occlusive disease      Impression: Impression: 1. There is less than 50% stenosis of the right internal carotid artery. 2. There is less than 50% stenosis of the left internal carotid artery. 3. Antegrade flow is demonstrated in bilateral vertebral arteries.  Comments: Bilateral carotid vertebral arterial duplex scan was performed.  Grayscale imaging shows intimal thickening and calcified elements at the carotid bifurcation. The right internal carotid artery peak systolic velocity is 98.5 cm/sec. The end-diastolic velocity is 14.2 cm/sec. The right ICA/CCA ratio is approximately 0.9. These findings correlate with less than 50% stenosis of the right internal carotid artery.  Grayscale imaging shows intimal thickening and calcified elements at the  carotid bifurcation. The left internal carotid artery peak systolic velocity is 112.3 cm/sec. The end-diastolic velocity is 22 cm/sec. The left ICA/CCA ratio is approximately 1.3. These findings correlate with less than 50% stenosis of the left internal carotid artery.  Antegrade flow is demonstrated in bilateral vertebral arteries. There is greater than 50% stenosis in the left external carotid artery.  This report was signed and finalized on 7/28/2025 4:34 PM by Dr. Puneet Chang MD.       EXAMINATION:  CT ABDOMEN WITH CONTRAST-  4/14/2025 1:54 PM     HISTORY: Renal cell carcinoma left kidney.     TECHNIQUE: Spiral CT was performed of the abdomen with IV contrast. Dual  phase imaging was performed after contrast administration. Multiplanar  images were reconstructed.     DLP: 871.77 mGy.cm Automated dosage reduction technique was utilized to  reduce patient dose.     COMPARISON: 3/8/2024.     LUNG BASES: There is centrilobular and paraseptal emphysema. There is  mild peripheral pulmonary fibrosis. There is atheromatous calcification  of the thoracic aorta and coronary arteries. There has been prior heart  bypass surgery. There is cardiomegaly. There is dependent atelectasis.     LIVER AND SPLEEN: There is a stable 8 mm cyst in the left hepatic lobe  image 29 series 6. The liver is otherwise unremarkable. Spleen size is  upper limits of normal measuring 12 cm in length. There are no dense  gallstones.     PANCREAS: No pancreatic mass or inflammatory change.     KIDNEYS AND ADRENALS: The adrenal glands are normal. Prior left  nephrectomy. There is an upper to midpole renal cyst on the right  measuring 2.8 cm with a Hounsfield unit measurement of -3. There is an  upper to midpole renal cyst on the right posteriorly measuring 3.8 cm  with a Hounsfield unit measurement of 3. There are no solid appearing  right renal lesions. The visualized portion of the right ureter is  nondilated.     BOWEL: There is a small  hiatal hernia. There is under distention of the  stomach. Visualized small bowel loops are normal caliber. Visualized  colon is unremarkable.     OTHER: There is atheromatous disease of the aortoiliac vessels. The  abdominal aorta measures 2.6 cm. 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 There are degenerative changes of the visualized spine.        IMPRESSION:  1. Prior left nephrectomy. No evidence of any recurrent neoplasm in the  nephrectomy bed.  2. There are 2 right renal cysts. A subcentimeter liver cyst in the left  hepatic lobe.  3. Centrilobular and paraseptal emphysema. Mild peripheral pulmonary  fibrosis.  4. Atheromatous disease of the thoracic aorta, coronary arteries and  abdominal aorta. 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 Cardiomegaly.  5. Small hiatal hernia.  6. Spleen size upper limits of normal. Other nonacute findings, as  discussed.     This report was signed and finalized on 4/14/2025 3:54 PM by Dr. Milad Olivarez MD.       Patient Active Problem List   Diagnosis    Coronary artery disease of native artery of native heart with stable angina pectoris    S/P CABG (coronary artery bypass graft) 1996 [place]     Mixed hyperlipidemia    Benign essential HTN    Family history of early CAD    Ex-smoker for more than 1 year 1PPD x 40 years quit 16 years ago    Nonrheumatic aortic valve insufficiency    Class 1 obesity due to excess calories with serious comorbidity and body mass index (BMI) of 30.0 to 30.9 in adult    Bilateral carotid artery stenosis    Stenosis of right carotid artery    Carotid stenosis, right    Abnormal laboratory test    Arteriosclerosis of arterial coronary artery bypass graft    Arthralgia    Benign prostatic hyperplasia with urinary obstruction    Cardiovascular system problem    Carotid artery occlusion    DDD (degenerative disc disease),  lumbosacral    Inflammatory polyarthropathy    Intermittent claudication    Osteoarthritis of knee    Palpitations    Renal cell carcinoma    Tendinitis    BMI 31.0-31.9,adult    Abnormal nuclear stress test infarction no ischemia         Diagnosis Plan   1. Bilateral carotid artery stenosis  US Carotid Bilateral      2. Mixed hyperlipidemia        3. Benign essential HTN        4. Dissection of abdominal aorta                Plan: After thoroughly evaluating Marika Gutierrez, I believe the best course of action is to remain conservative from vascular surgery standpoint.  Overall he is doing well and denies any strokelike symptoms.  I did review his testing which shows less than 50% carotid stenosis bilaterally.  He also had follow-up testing with urology which I did reviewed noting chronic appearing aortic dissection.  This appears stable.  Will see him back in 1 year with repeat noninvasive testing for continued surveillance.  I will order a carotid duplex.  He and reports he typically has follow-up with urology and testing as well.  I did discuss vascular risk factors as it pertains to the progression of vascular disease including controlling his hypertension and hyperlipidemia.  His blood pressure is stable on his current medications.  He should continue his Plavix 75 mg daily and Lipitor 40 mg daily in addition to his other medications. Body mass index is 30.88 kg/m².   This was all discussed in full with complete understanding.  Thank you for allowing me to participate in the care of your patient.  Please do not hesitate to call with any questions or concerns.  We will keep you aware of any further encounters with Marika Gutierrez.        Sincerely yours,         DONA Méndez, Abilio Solis MD

## 2025-07-28 NOTE — LETTER
July 28, 2025     Abilio Martinez MD  [address] [facility] [address]    Patient: Marika Gutierrez   YOB: 1945   Date of Visit: 7/28/2025     Dear Abilio Martinez MD:       Thank you for referring Marika Gutierrez to me for evaluation. Below are the relevant portions of my assessment and plan of care.    If you have questions, please do not hesitate to call me. I look forward to following Marika along with you.         Sincerely,        DONA Méndez        CC: No Recipients    Faith Wells APRN  07/28/25 2016  Sign when Signing Visit  7/28/2025       Abilio Martinez MD  [address] [facility] [address]    Marika Gutierrez  1945    Chief Complaint   Patient presents with   • Bilateral carotid artery stenosis     No new concerns. Completed testing today.        Dear Abilio Martinez MD    HPI  I had the pleasure of seeing your patient Marika Gutierrez in the office today.    As you recall, Marika Gutierrez is a 79 y.o.  male who we are following for carotid occlusive disease as well as chronic aortic dissection.  He did undergo a left carotid endarterectomy on 8/18/2021 and a right carotid on 10/18/2021.  Aortic dissection was found on imaging with urology.  He does have a history of renal cell carcinoma and previous left nephrectomy.  Currently he is doing well and denies any strokelike symptoms.  He denies any abdominal pain.  He is maintained on Plavix and Lipitor.  He did have noninvasive testing performed today, which I did review in office.        Review of Systems   Constitutional: Negative.    HENT: Negative.     Eyes: Negative.    Respiratory: Negative.     Cardiovascular: Negative.    Gastrointestinal: Negative.    Endocrine: Negative.    Genitourinary: Negative.    Musculoskeletal: Negative.    Skin: Negative.    Allergic/Immunologic: Negative.    Neurological: Negative.    Hematological: Negative.    Psychiatric/Behavioral:  "Negative.     All other systems reviewed and are negative.         /64   Pulse 72   Ht 172.7 cm (67.99\")   Wt 92.1 kg (203 lb)   SpO2 96%   BMI 30.88 kg/m²   Physical Exam  Vitals and nursing note reviewed.   Constitutional:       General: He is not in acute distress.     Appearance: Normal appearance. He is well-developed. He is obese. He is not diaphoretic.   HENT:      Head: Normocephalic and atraumatic.   Neck:      Vascular: No carotid bruit or JVD.   Cardiovascular:      Rate and Rhythm: Normal rate and regular rhythm.      Pulses: Normal pulses.           Femoral pulses are 2+ on the right side and 2+ on the left side.       Popliteal pulses are 2+ on the right side and 2+ on the left side.        Dorsalis pedis pulses are 2+ on the right side and 2+ on the left side.        Posterior tibial pulses are 2+ on the right side and 2+ on the left side.      Heart sounds: S1 normal and S2 normal. Murmur heard.      No friction rub. No gallop.   Pulmonary:      Effort: Pulmonary effort is normal.      Breath sounds: Normal breath sounds.   Abdominal:      General: Bowel sounds are normal. There is no abdominal bruit.      Palpations: Abdomen is soft.      Tenderness: There is no abdominal tenderness.   Musculoskeletal:         General: Normal range of motion.   Skin:     General: Skin is warm and dry.   Neurological:      General: No focal deficit present.      Mental Status: He is alert and oriented to person, place, and time.      Cranial Nerves: No cranial nerve deficit.   Psychiatric:         Mood and Affect: Mood normal.         Behavior: Behavior normal.         Thought Content: Thought content normal.         Judgment: Judgment normal.           Diagnostic Data:  US Carotid Bilateral  Result Date: 7/28/2025  Narrative: History: Carotid occlusive disease      Impression: Impression: 1. There is less than 50% stenosis of the right internal carotid artery. 2. There is less than 50% stenosis of the " left internal carotid artery. 3. Antegrade flow is demonstrated in bilateral vertebral arteries.  Comments: Bilateral carotid vertebral arterial duplex scan was performed.  Grayscale imaging shows intimal thickening and calcified elements at the carotid bifurcation. The right internal carotid artery peak systolic velocity is 98.5 cm/sec. The end-diastolic velocity is 14.2 cm/sec. The right ICA/CCA ratio is approximately 0.9. These findings correlate with less than 50% stenosis of the right internal carotid artery.  Grayscale imaging shows intimal thickening and calcified elements at the carotid bifurcation. The left internal carotid artery peak systolic velocity is 112.3 cm/sec. The end-diastolic velocity is 22 cm/sec. The left ICA/CCA ratio is approximately 1.3. These findings correlate with less than 50% stenosis of the left internal carotid artery.  Antegrade flow is demonstrated in bilateral vertebral arteries. There is greater than 50% stenosis in the left external carotid artery.  This report was signed and finalized on 7/28/2025 4:34 PM by Dr. Puneet Chang MD.       EXAMINATION:  CT ABDOMEN WITH CONTRAST-  4/14/2025 1:54 PM     HISTORY: Renal cell carcinoma left kidney.     TECHNIQUE: Spiral CT was performed of the abdomen with IV contrast. Dual  phase imaging was performed after contrast administration. Multiplanar  images were reconstructed.     DLP: 871.77 mGy.cm Automated dosage reduction technique was utilized to  reduce patient dose.     COMPARISON: 3/8/2024.     LUNG BASES: There is centrilobular and paraseptal emphysema. There is  mild peripheral pulmonary fibrosis. There is atheromatous calcification  of the thoracic aorta and coronary arteries. There has been prior heart  bypass surgery. There is cardiomegaly. There is dependent atelectasis.     LIVER AND SPLEEN: There is a stable 8 mm cyst in the left hepatic lobe  image 29 series 6. The liver is otherwise unremarkable. Spleen size is  upper  limits of normal measuring 12 cm in length. There are no dense  gallstones.     PANCREAS: No pancreatic mass or inflammatory change.     KIDNEYS AND ADRENALS: The adrenal glands are normal. Prior left  nephrectomy. There is an upper to midpole renal cyst on the right  measuring 2.8 cm with a Hounsfield unit measurement of -3. There is an  upper to midpole renal cyst on the right posteriorly measuring 3.8 cm  with a Hounsfield unit measurement of 3. There are no solid appearing  right renal lesions. The visualized portion of the right ureter is  nondilated.     BOWEL: There is a small hiatal hernia. There is under distention of the  stomach. Visualized small bowel loops are normal caliber. Visualized  colon is unremarkable.     OTHER: There is atheromatous disease of the aortoiliac vessels. The  abdominal aorta measures 2.6 cm. 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 There are degenerative changes of the visualized spine.        IMPRESSION:  1. Prior left nephrectomy. No evidence of any recurrent neoplasm in the  nephrectomy bed.  2. There are 2 right renal cysts. A subcentimeter liver cyst in the left  hepatic lobe.  3. Centrilobular and paraseptal emphysema. Mild peripheral pulmonary  fibrosis.  4. Atheromatous disease of the thoracic aorta, coronary arteries and  abdominal aorta. 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 Cardiomegaly.  5. Small hiatal hernia.  6. Spleen size upper limits of normal. Other nonacute findings, as  discussed.     This report was signed and finalized on 4/14/2025 3:54 PM by Dr. Milad Olivarez MD.       Patient Active Problem List   Diagnosis   • Coronary artery disease of native artery of native heart with stable angina pectoris   • S/P CABG (coronary artery bypass graft) 1996 [place]    • Mixed hyperlipidemia   • Benign essential HTN   • Family history of early  CAD   • Ex-smoker for more than 1 year 1PPD x 40 years quit 16 years ago   • Nonrheumatic aortic valve insufficiency   • Class 1 obesity due to excess calories with serious comorbidity and body mass index (BMI) of 30.0 to 30.9 in adult   • Bilateral carotid artery stenosis   • Stenosis of right carotid artery   • Carotid stenosis, right   • Abnormal laboratory test   • Arteriosclerosis of arterial coronary artery bypass graft   • Arthralgia   • Benign prostatic hyperplasia with urinary obstruction   • Cardiovascular system problem   • Carotid artery occlusion   • DDD (degenerative disc disease), lumbosacral   • Inflammatory polyarthropathy   • Intermittent claudication   • Osteoarthritis of knee   • Palpitations   • Renal cell carcinoma   • Tendinitis   • BMI 31.0-31.9,adult   • Abnormal nuclear stress test infarction no ischemia         Diagnosis Plan   1. Bilateral carotid artery stenosis  US Carotid Bilateral      2. Mixed hyperlipidemia        3. Benign essential HTN        4. Dissection of abdominal aorta                Plan: After thoroughly evaluating Marika Gutierrez, I believe the best course of action is to remain conservative from vascular surgery standpoint.  Overall he is doing well and denies any strokelike symptoms.  I did review his testing which shows less than 50% carotid stenosis bilaterally.  He also had follow-up testing with urology which I did reviewed noting chronic appearing aortic dissection.  This appears stable.  Will see him back in 1 year with repeat noninvasive testing for continued surveillance.  I will order a carotid duplex.  He and reports he typically has follow-up with urology and testing as well.  I did discuss vascular risk factors as it pertains to the progression of vascular disease including controlling his hypertension and hyperlipidemia.  His blood pressure is stable on his current medications.  He should continue his Plavix 75 mg daily and Lipitor 40 mg daily in addition  to his other medications. Body mass index is 30.88 kg/m².   This was all discussed in full with complete understanding.  Thank you for allowing me to participate in the care of your patient.  Please do not hesitate to call with any questions or concerns.  We will keep you aware of any further encounters with Marika Gutierrez.        Sincerely yours,         DONA Méndez, Abilio Solis MD

## (undated) DEVICE — GAUZE,SPONGE,4"X4",16PLY,XRAY,STRL,LF: Brand: MEDLINE

## (undated) DEVICE — DRSNG SURESITE WNDW 4X4.5

## (undated) DEVICE — SUT ETHLN 3/0 FS1 30IN 669H

## (undated) DEVICE — SUT PROLN 7/0 BV1 24IN 4PK M8702

## (undated) DEVICE — 3M™ STERI-STRIP™ REINFORCED ADHESIVE SKIN CLOSURES, R1547, 1/2 IN X 4 IN (12 MM X 100 MM), 6 STRIPS/ENVELOPE: Brand: 3M™ STERI-STRIP™

## (undated) DEVICE — PAD MAJOR VASCULAR: Brand: MEDLINE INDUSTRIES, INC.

## (undated) DEVICE — SPNG GZ STRL 2S 4X4 12PLY

## (undated) DEVICE — 3M™ IOBAN™ 2 ANTIMICROBIAL INCISE DRAPE 6650EZ: Brand: IOBAN™ 2

## (undated) DEVICE — PK TURNOVER RM ADV

## (undated) DEVICE — PROXIMATE RH ROTATING HEAD SKIN STAPLERS (35 WIDE) CONTAINS 35 STAINLESS STEEL STAPLES: Brand: PROXIMATE

## (undated) DEVICE — SUT MNCRYL 4/0 PS2 27IN UD MCP426H

## (undated) DEVICE — RESERVOIR,SUCTION,100CC,SILICONE: Brand: MEDLINE

## (undated) DEVICE — SUNDT™ EXTERNAL CAROTID ENDARTERECTOMY SHUNT: Brand: SUNDT™

## (undated) DEVICE — DRP SURG UNIV BASIC BILAMINATE 53X77IN DISP

## (undated) DEVICE — SUT PROLN 5/0 C1 DA 24IN 8725H

## (undated) DEVICE — CANN VESL ACRN TP 4MM

## (undated) DEVICE — NDL HYPO PRECISIONGLIDE REG 22G 1 1/2

## (undated) DEVICE — SYR TB PRECISIONGLIDE 1CC 26G 3/8IN LF

## (undated) DEVICE — SUT PROLN 6/0 4/24IN BV1 MON BL M8805

## (undated) DEVICE — WIPE MEROCEL 3.625X3IN

## (undated) DEVICE — GLV SURG SENSICARE W/ALOE PF LF 7.5 STRL

## (undated) DEVICE — Device

## (undated) DEVICE — ANTIBACTERIAL UNDYED BRAIDED (POLYGLACTIN 910), SYNTHETIC ABSORBABLE SUTURE: Brand: COATED VICRYL